# Patient Record
Sex: FEMALE | Race: WHITE | NOT HISPANIC OR LATINO | Employment: FULL TIME | URBAN - METROPOLITAN AREA
[De-identification: names, ages, dates, MRNs, and addresses within clinical notes are randomized per-mention and may not be internally consistent; named-entity substitution may affect disease eponyms.]

---

## 2017-01-30 ENCOUNTER — OFFICE VISIT (OUTPATIENT)
Dept: FAMILY MEDICINE | Facility: CLINIC | Age: 32
End: 2017-01-30
Payer: COMMERCIAL

## 2017-01-30 VITALS
WEIGHT: 149 LBS | RESPIRATION RATE: 18 BRPM | DIASTOLIC BLOOD PRESSURE: 66 MMHG | BODY MASS INDEX: 23.39 KG/M2 | HEART RATE: 68 BPM | SYSTOLIC BLOOD PRESSURE: 106 MMHG | HEIGHT: 67 IN

## 2017-01-30 DIAGNOSIS — T75.3XXA MOTION SICKNESS, INITIAL ENCOUNTER: Primary | ICD-10-CM

## 2017-01-30 PROCEDURE — 99213 OFFICE O/P EST LOW 20 MIN: CPT | Performed by: FAMILY MEDICINE

## 2017-01-30 RX ORDER — SCOLOPAMINE TRANSDERMAL SYSTEM 1 MG/1
PATCH, EXTENDED RELEASE TRANSDERMAL
Qty: 3 PATCH | Refills: 0 | Status: SHIPPED
Start: 2017-01-30 | End: 2018-09-05

## 2017-01-30 RX ORDER — ONDANSETRON 4 MG/1
4 TABLET, FILM COATED ORAL EVERY 8 HOURS PRN
Qty: 10 TABLET | Refills: 0 | Status: SHIPPED | OUTPATIENT
Start: 2017-01-30 | End: 2017-06-11

## 2017-01-30 NOTE — PATIENT INSTRUCTIONS
Thank you for choosing [facility] (formerly [facility]).  You may be receiving a survey in the mail from [facility] regarding your visit today.  Please take a few minutes to complete and return the survey to let us know how we are doing.      Our Clinic hours are:  Mondays    7:20 am - 7 pm  Tues -  Fri  7:20 am - 5 pm    Clinic Phone: [phone]    The clinic lab opens at 7:30 am Mon - Fri and appointments are required.    [facility]. [phone]  Monday-Thursday 8 am - 7pm  Tues/Wed/Fri 8 am - 5:30 pm

## 2017-01-30 NOTE — PROGRESS NOTES
"  SUBJECTIVE:                                                    Virginia Wood is a 31 year old female who presents to clinic today for the following health issues:    Chief Complaint   Patient presents with     Patient Request     Patient is traveling to [place] and is requesting something for motion sickness. Patient has previously tried patches and zofran which are of help.     SUBJECTIVE:  Virginia Wood, a 31 year old female scheduled an appointment to discuss the following issues:  Motion sickness, initial encounter    Always has problems with motion sickness, flying, riding in cars, boats.  Going to [place] for a week and is worried that she'll have problems.    is a pharmacist.    Has previously used zofran and scopolamine patches.    Medical, social, surgical, and family histories reviewed.    ROS:  5 point ROS negative except as noted above in HPI, including Gen., Resp., CV, GI &  system review.    OBJECTIVE:  /66 mmHg  Pulse 68  Resp 18  Ht 5' 7\" (1.702 m)  Wt 149 lb (67.586 kg)  BMI 23.33 kg/m2  LMP 04/11/2013  Breastfeeding? No  EXAM:  GENERAL APPEARANCE ADULT: Alert, no acute distress    ASSESSMENT/PLAN:    (T75.3XXA) Motion sickness, initial encounter  (primary encounter diagnosis)  Comment:    Plan: scopolamine (TRANSDERM) 72 hr patch,         ondansetron (ZOFRAN) 4 MG tablet           Educated on side effects, proper use of medication and other non-medication options (sea bands) for motion sickness.      Patient Instructions         Thank you for choosing [facility].  You may be receiving a survey in the mail from [employer] regarding your visit today.  Please take a few minutes to complete and return the survey to let us know how we are doing.      Our Clinic hours are:  Mondays    7:20 am - 7 pm  Tues -  Fri  7:20 am - 5 pm    Clinic Phone: [phone]    The clinic lab opens at 7:30 am Mon - Fri and appointments are required.    [facility] " [facility]. [phone]  Monday-Thursday 8 am - 7pm  Tues/Wed/Fri 8 am - 5:30 pm

## 2017-01-30 NOTE — MR AVS SNAPSHOT
After Visit Summary   1/30/2017    Virginia Wood    MRN: [id number]           Patient Information     Date Of Birth          1985        Visit Information        Provider Department      1/30/2017 9:40 AM Ute Stearns MD [facility]        Today's Diagnoses     Motion sickness, initial encounter    -  1       Care Instructions          Thank you for choosing [facility].  You may be receiving a survey in the mail from [facility] regarding your visit today.  Please take a few minutes to complete and return the survey to let us know how we are doing.      Our Clinic hours are:  Mondays    7:20 am - 7 pm  Tues -  Fri  7:20 am - 5 pm    Clinic Phone: [phone]    The clinic lab opens at 7:30 am Mon - Fri and appointments are required.    [facility]  Ph. [phone]  Monday-Thursday 8 am - 7pm  Tues/Wed/Fri 8 am - 5:30 pm               Follow-ups after your visit        Your next 10 appointments already scheduled     Jan 30, 2017  9:40 AM   Stephaniet Long with Ute Stearns MD   [facility] ([facility])    [address] [id number] [phone]            Feb 22, 2017 10:30 AM   PHYSICAL with Marcus Burks MD   [facility] ([facility])    [address] [address] [phone]              Who to contact     If you have questions or need follow up information about today's clinic visit or your schedule please contact [facility] directly at [phone].  Normal or non-critical lab and imaging results will be communicated to you by MyChart, letter or phone within 4 business days after the clinic has received the results. If you do not hear from us within 7 days, please contact the clinic through MyChart or phone. If you have a critical or abnormal lab result, we will notify you by phone as soon as  "possible.  Submit refill requests through [employer] or call your pharmacy and they will forward the refill request to us. Please allow 3 business days for your refill to be completed.          Additional Information About Your Visit        [employer] Information     [employer] gives you secure access to your electronic health record. If you see a primary care provider, you can also send messages to your care team and make appointments. If you have questions, please call your primary care clinic.  If you do not have a primary care provider, please call [phone] and they will assist you.        Care EveryWhere ID     This is your Care EveryWhere ID. This could be used by other organizations to access your [place] medical records  [id number]        Your Vitals Were     Pulse Respirations Height BMI (Body Mass Index) Last Period Breastfeeding?    68 18 5' 7\" (1.702 m) 23.33 kg/m2 04/11/2013 No       Blood Pressure from Last 3 Encounters:   01/30/17 106/66   05/21/16 110/57   05/28/15 106/63    Weight from Last 3 Encounters:   01/30/17 149 lb (67.586 kg)   05/28/15 138 lb 12.8 oz (62.959 kg)   10/02/14 145 lb (65.772 kg)              Today, you had the following     No orders found for display         Today's Medication Changes          These changes are accurate as of: 1/30/17  9:34 AM.  If you have any questions, ask your nurse or doctor.               Start taking these medicines.        Dose/Directions    ondansetron 4 MG tablet   Commonly known as:  ZOFRAN   Used for:  Motion sickness, initial encounter   Started by:  Ute Stearns MD        Dose:  4 mg   Take 1 tablet (4 mg) by mouth every 8 hours as needed for nausea   Quantity:  10 tablet   Refills:  0       scopolamine 72 hr patch   Commonly known as:  TRANSDERM   Used for:  Motion sickness, initial encounter   Started by:  Ute Stearns MD        Place 1 patch onto the skin every 72 hours.  Apply to hairless area behind one ear at least 4 hours before " travel.  Remove old patch and change every 3 days.   Quantity:  3 patch   Refills:  0            Where to get your medicines      These medications were sent to [facility] [place], [place] - [address] [facility]  [address] [facility] [place] [facility] [address]     Phone:  [phone]    - ondansetron 4 MG tablet  - scopolamine 72 hr patch             Primary Care Provider Office Phone # Fax #    Ute Stearns [phone]       [facility] [address] [facility] [address]        Thank you!     Thank you for choosing [facility]  for your care. Our goal is always to provide you with excellent care. Hearing back from our patients is one way we can continue to improve our services. Please take a few minutes to complete the written survey that you may receive in the mail after your visit with us. Thank you!             Your Updated Medication List - Protect others around you: Learn how to safely use, store and throw away your medicines at www.disposemymeds.org.          This list is accurate as of: 1/30/17  9:34 AM.  Always use your most recent med list.                   Brand Name Dispense Instructions for use    DAILY MULTI PO      Take  by mouth.       IBUPROFEN PO          ondansetron 4 MG tablet    ZOFRAN    10 tablet    Take 1 tablet (4 mg) by mouth every 8 hours as needed for nausea       scopolamine 72 hr patch    TRANSDERM    3 patch    Place 1 patch onto the skin every 72 hours.  Apply to hairless area behind one ear at least 4 hours before travel.  Remove old patch and change every 3 days.       TYLENOL 325 MG tablet   Generic drug:  acetaminophen      1 TO 2 TABLETS BY MOUTH EVERY 4 HOURS AS NEEDED FOR PAIN, DO NOT EXCEED 4,000 MG OF ACETAMINOPHEN IN 24 HOURS

## 2017-01-30 NOTE — NURSING NOTE
"Chief Complaint   Patient presents with     Patient Request     Patient is traveling to [place] and is requesting something for motion sickness. Patient has previously tried patches and zofran which are of help.       Initial /66 mmHg  Pulse 68  Resp 18  Ht 5' 7\" (1.702 m)  Wt 149 lb (67.586 kg)  BMI 23.33 kg/m2  LMP 04/11/2013  Breastfeeding? No Estimated body mass index is 23.33 kg/(m^2) as calculated from the following:    Height as of this encounter: 5' 7\" (1.702 m).    Weight as of this encounter: 149 lb (67.586 kg).  BP completed using cuff size: regular    "

## 2017-02-22 ENCOUNTER — OFFICE VISIT (OUTPATIENT)
Dept: OBGYN | Facility: CLINIC | Age: 32
End: 2017-02-22
Payer: COMMERCIAL

## 2017-02-22 VITALS
SYSTOLIC BLOOD PRESSURE: 109 MMHG | DIASTOLIC BLOOD PRESSURE: 71 MMHG | HEART RATE: 84 BPM | HEIGHT: 67 IN | BODY MASS INDEX: 23.32 KG/M2 | WEIGHT: 148.6 LBS

## 2017-02-22 DIAGNOSIS — R68.82 DECREASED LIBIDO: ICD-10-CM

## 2017-02-22 DIAGNOSIS — Z90.710 STATUS POST HYSTERECTOMY: Primary | ICD-10-CM

## 2017-02-22 DIAGNOSIS — Z00.00 ROUTINE GENERAL MEDICAL EXAMINATION AT A HEALTH CARE FACILITY: ICD-10-CM

## 2017-02-22 DIAGNOSIS — N80.9 ENDOMETRIOSIS: ICD-10-CM

## 2017-02-22 PROCEDURE — 99395 PREV VISIT EST AGE 18-39: CPT | Performed by: OBSTETRICS & GYNECOLOGY

## 2017-02-22 NOTE — PROGRESS NOTES
SUBJECTIVE:     CC: Virginia Wood is an 31 year old woman who presents for preventive health visit.     Healthy Habits:    Do you get at least three servings of calcium containing foods daily (dairy, green leafy vegetables, etc.)? yes    Amount of exercise or daily activities, outside of work: 6 day(s) per week    Problems taking medications regularly No    Medication side effects: No    Have you had an eye exam in the past two years? yes    Do you see a dentist twice per year? yes    Do you have sleep apnea, excessive snoring or daytime drowsiness?no        Pain off and on on right side- possible ovarian cyst.  Low libido..    Today's PHQ-2 Score:   PHQ-2 ( 1999 Pf[employer]) 2/22/2017 1/30/2017   Q1: Little interest or pleasure in doing things 0 0   Q2: Feeling down, depressed or hopeless 0 0   PHQ-2 Score 0 0       Abuse: Current or Past(Physical, Sexual or Emotional)- No  Do you feel safe in your environment - Yes    Social History   Substance Use Topics     Smoking status: Former Smoker     Packs/day: 0.50     Years: 2.00     Types: Cigarettes     Quit date: 2/1/2008     Smokeless tobacco: Never Used      Comment: occasional smoker     Alcohol use No      Comment: minimal; quit since pregnancy     The patient does not drink >3 drinks per day nor >7 drinks per week.    No results for input(s): CHOL, HDL, LDL, TRIG, CHOLHDLRATIO, NHDL in the last 15405 hours.    Reviewed orders with patient.  Reviewed health maintenance and updated orders accordingly - Yes    Mammo Decision Support:  Mammogram not appropriate for this patient based on age.    Pertinent mammograms are reviewed under the imaging tab.  History of abnormal Pap smear: Status post benign hysterectomy. Health Maintenance and Surgical History updated.  All Histories reviewed and updated in Epic.  History reviewed. No pertinent past medical history.   Past Surgical History   Procedure Laterality Date     C oral surgery procedure  6/2007     luisa  teeth     Laparoscopic hysterectomy total, bilateral salpingo-oophorectomy, combined  2013     Procedure: COMBINED LAPAROSCOPIC HYSTERECTOMY TOTAL, SALPINGO-OOPHORECTOMY;  Laparoscopic total hysterectomy & bilateral salpingectomy & left oopherectomy & cystoscopy;  Surgeon: Marcus Burks MD;  Location: [facility] OR     Hysterectomy, pap no longer indicated       Obstetric History       T1      TAB0   SAB1   E0   M0   L2       # Outcome Date GA Lbr Jaylen/2nd Weight Sex Delivery Anes PTL Lv   3 Para 02/06/10   7 lb 1 oz (3.204 kg) F  EPI  Y      Name: Ginette      Apgar1:  9                Apgar5: 9   2 Term 08 40w0d 07:17 7 lb 14 oz (3.572 kg) M IVD EPI N Y      Name: Pantera      Apgar1:  9                Apgar5: 9   1 SAB /03 6w0d    SAB   N          ROS:  C: NEGATIVE for fever, chills, change in weight  I: NEGATIVE for worrisome rashes, moles or lesions  E: NEGATIVE for vision changes or irritation  ENT: NEGATIVE for ear, mouth and throat problems  R: NEGATIVE for significant cough or SOB  B: NEGATIVE for masses, tenderness or discharge  CV: NEGATIVE for chest pain, palpitations or peripheral edema  GI: NEGATIVE for nausea, abdominal pain, heartburn, or change in bowel habits  : NEGATIVE for unusual urinary or vaginal symptoms. Periods are regular.  M: NEGATIVE for significant arthralgias or myalgia  N: NEGATIVE for weakness, dizziness or paresthesias  E: NEGATIVE for temperature intolerance, skin/hair changes  H: NEGATIVE for bleeding problems  P: NEGATIVE for changes in mood or affect    BP Readings from Last 3 Encounters:   17 109/71   17 106/66   16 110/57    Wt Readings from Last 3 Encounters:   17 148 lb 9.6 oz (67.4 kg)   17 149 lb (67.6 kg)   05/28/15 138 lb 12.8 oz (63 kg)                  Patient Active Problem List   Diagnosis     Lumbago     Tobacco use disorder     Lumbar radicular pain     CARDIOVASCULAR SCREENING; LDL GOAL LESS THAN 160      Endometriosis     UTI (urinary tract infection)     Status post left oophorectomy     Status post hysterectomy     Past Surgical History   Procedure Laterality Date     C oral surgery procedure  6/2007     wisdom teeth     Laparoscopic hysterectomy total, bilateral salpingo-oophorectomy, combined  5/20/2013     Procedure: COMBINED LAPAROSCOPIC HYSTERECTOMY TOTAL, SALPINGO-OOPHORECTOMY;  Laparoscopic total hysterectomy & bilateral salpingectomy & left oopherectomy & cystoscopy;  Surgeon: Marcus Burks MD;  Location: [facility] OR     Hysterectomy, pap no longer indicated         Social History   Substance Use Topics     Smoking status: Former Smoker     Packs/day: 0.50     Years: 2.00     Types: Cigarettes     Quit date: 2/1/2008     Smokeless tobacco: Never Used      Comment: occasional smoker     Alcohol use No      Comment: minimal; quit since pregnancy     Family History   Problem Relation Age of Onset     Lipids Maternal Grandmother      HEART DISEASE Maternal Grandmother      Eye Disorder Maternal Grandmother      macular degeneration     DIABETES Maternal Grandmother      Hypertension Maternal Grandmother      Depression Mother      Alcohol/Drug Maternal Grandfather      alcohol     Depression Maternal Grandfather      Asthma No family hx of      Breast Cancer No family hx of      Cancer - colorectal No family hx of      Prostate Cancer No family hx of      CEREBROVASCULAR DISEASE No family hx of          Current Outpatient Prescriptions   Medication Sig Dispense Refill     COMPOUND (CMPD RX) - PHARMACY TO MIX COMPOUNDED MEDICATION Testosterone powder 0.3125 mg   1 po daily capsule 30 capsule 1     IBUPROFEN PO        Multiple Vitamins-Minerals (DAILY MULTI PO) Take  by mouth.       TYLENOL 325 MG OR TABS 1 TO 2 TABLETS BY MOUTH EVERY 4 HOURS AS NEEDED FOR PAIN, DO NOT EXCEED 4,000 MG OF ACETAMINOPHEN IN 24 HOURS       scopolamine (TRANSDERM) 72 hr patch Place 1 patch onto the skin every 72 hours.  Apply to  "hairless area behind one ear at least 4 hours before travel.  Remove old patch and change every 3 days. (Patient not taking: Reported on 2/22/2017) 3 patch 0     ondansetron (ZOFRAN) 4 MG tablet Take 1 tablet (4 mg) by mouth every 8 hours as needed for nausea (Patient not taking: Reported on 2/22/2017) 10 tablet 0     Allergies   Allergen Reactions     Nkda [No Known Drug Allergies]      OBJECTIVE:     /71 (BP Location: Right arm, Patient Position: Chair, Cuff Size: Adult Regular)  Pulse 84  Ht 5' 6.5\" (1.689 m)  Wt 148 lb 9.6 oz (67.4 kg)  LMP 04/11/2013  BMI 23.63 kg/m2  EXAM:  GENERAL: healthy, alert and no distress  EYES: Eyes grossly normal to inspection, PERRL and conjunctivae and sclerae normal  NECK: no adenopathy, no asymmetry, masses, or scars and thyroid normal to palpation  RESP: lungs clear to auscultation - no rales, rhonchi or wheezes  BREAST: normal without masses, tenderness or nipple discharge and no palpable axillary masses or adenopathy  CV: regular rate and rhythm, normal S1 S2, no S3 or S4, no murmur, click or rub, no peripheral edema and peripheral pulses strong  ABDOMEN: soft, nontender, no hepatosplenomegaly, no masses and bowel sounds normal   (female): normal female external genitalia, normal urethral meatus , vaginal mucosa pink, moist, well rugated and normal post-hysterectomy exam without masses.   MS: no gross musculoskeletal defects noted, no edema  SKIN: no suspicious lesions or rashes  NEURO: Normal strength and tone, mentation intact and speech normal  PSYCH: mentation appears normal, affect normal/bright    ASSESSMENT/PLAN:     1. Status post hysterectomy  Benign indication  Pap no longer indicated    2. Routine general medical examination at a health care facility      3. Decreased libido  No dyspareunia  - COMPOUND (CMPD RX) - PHARMACY TO MIX COMPOUNDED MEDICATION; Testosterone powder 0.3125 mg   1 po daily capsule  Dispense: 30 capsule; Refill: 1    4. " "Endometriosis  Extensive,  involving the diaphragm  Retains her right ovary      COUNSELING:   Reviewed preventive health counseling, as reflected in patient instructions       Regular exercise       Healthy diet/nutrition         reports that she quit smoking about 9 years ago. Her smoking use included Cigarettes. She has a 1.00 pack-year smoking history. She has never used smokeless tobacco.    Estimated body mass index is 23.63 kg/(m^2) as calculated from the following:    Height as of this encounter: 5' 6.5\" (1.689 m).    Weight as of this encounter: 148 lb 9.6 oz (67.4 kg).       Counseling Resources:  ATP IV Guidelines  Pooled Cohorts Equation Calculator  Breast Cancer Risk Calculator  FRAX Risk Assessment  ICSI Preventive Guidelines  Dietary Guidelines for Americans, 2010  USDA's MyPlate  ASA Prophylaxis  Lung CA Screening    Marcus Burks MD  [facility]  "

## 2017-02-22 NOTE — NURSING NOTE
"Initial /71 (BP Location: Right arm, Patient Position: Chair, Cuff Size: Adult Regular)  Pulse 84  Ht 5' 6.5\" (1.689 m)  Wt 148 lb 9.6 oz (67.4 kg)  LMP 04/11/2013  BMI 23.63 kg/m2 Estimated body mass index is 23.63 kg/(m^2) as calculated from the following:    Height as of this encounter: 5' 6.5\" (1.689 m).    Weight as of this encounter: 148 lb 9.6 oz (67.4 kg). .      "

## 2017-02-22 NOTE — MR AVS SNAPSHOT
After Visit Summary   2/22/2017    Virginia Wood    MRN: [id number]           Patient Information     Date Of Birth          1985        Visit Information        Provider Department      2/22/2017 10:30 AM Marcus Burks MD [facility]        Today's Diagnoses     Status post hysterectomy    -  1    Routine general medical examination at a health care facility        Decreased libido          Care Instructions      Preventive Health Recommendations  Female Ages 26 - 39  Yearly exam:   See your health care provider every year in order to    Review health changes.     Discuss preventive care.      Review your medicines if you your doctor has prescribed any.    Until age 30: Get a Pap test every three years (more often if you have had an abnormal result).    After age 30: Talk to your doctor about whether you should have a Pap test every 3 years or have a Pap test with HPV screening every 5 years.   You do not need a Pap test if your uterus was removed (hysterectomy) and you have not had cancer.  You should be tested each year for STDs (sexually transmitted diseases), if you're at risk.   Talk to your provider about how often to have your cholesterol checked.  If you are at risk for diabetes, you should have a diabetes test (fasting glucose).  Shots: Get a flu shot each year. Get a tetanus shot every 10 years.   Nutrition:     Eat at least 5 servings of fruits and vegetables each day.    Eat whole-grain bread, whole-wheat pasta and brown rice instead of white grains and rice.    Talk to your provider about Calcium and Vitamin D.     Lifestyle    Exercise at least 150 minutes a week (30 minutes a day, 5 days of the week). This will help you control your weight and prevent disease.    Limit alcohol to one drink per day.    No smoking.     Wear sunscreen to prevent skin cancer.    See your dentist every six months for an exam and cleaning.          Follow-ups after your visit    "     Who to contact     If you have questions or need follow up information about today's clinic visit or your schedule please contact [facility] directly at [phone].  Normal or non-critical lab and imaging results will be communicated to you by [employer]ul[employer], letter or phone within 4 business days after the clinic has received the results. If you do not hear from us within 7 days, please contact the clinic through [employer]ul[employer] or phone. If you have a critical or abnormal lab result, we will notify you by phone as soon as possible.  Submit refill requests through [employer] or call your pharmacy and they will forward the refill request to us. Please allow 3 business days for your refill to be completed.          Additional Information About Your Visit        Demandbase Information     [employer] gives you secure access to your electronic health record. If you see a primary care provider, you can also send messages to your care team and make appointments. If you have questions, please call your primary care clinic.  If you do not have a primary care provider, please call [phone] and they will assist you.        Care EveryWhere ID     This is your Care EveryWhere ID. This could be used by other organizations to access your [place] medical records  [id number]        Your Vitals Were     Pulse Height Last Period BMI (Body Mass Index)          84 5' 6.5\" (1.689 m) 04/11/2013 23.63 kg/m2         Blood Pressure from Last 3 Encounters:   02/22/17 109/71   01/30/17 106/66   05/21/16 110/57    Weight from Last 3 Encounters:   02/22/17 148 lb 9.6 oz (67.4 kg)   01/30/17 149 lb (67.6 kg)   05/28/15 138 lb 12.8 oz (63 kg)              Today, you had the following     No orders found for display         Today's Medication Changes          These changes are accurate as of: 2/22/17 11:15 AM.  If you have any questions, ask your nurse or doctor.               Start taking these medicines.        Dose/Directions    COMPOUND - " PHARMACY TO MIX COMPOUNDED MEDICATION   Commonly known as:  CMPD RX   Used for:  Decreased libido   Started by:  Marcus Burks MD        Testosterone powder 0.3125 mg  1 po daily capsule   Quantity:  30 capsule   Refills:  1            Where to get your medicines      These medications were sent to [place], [place] - [address]  [address] [facility] [address]     Phone:  [phone]     COMPOUND - PHARMACY TO MIX COMPOUNDED MEDICATION                Primary Care Provider Office Phone # Fax #    Ute Stearns [phone]       [facility] [address] [address] [facility] [address]        Thank you!     Thank you for choosing [facility]  for your care. Our goal is always to provide you with excellent care. Hearing back from our patients is one way we can continue to improve our services. Please take a few minutes to complete the written survey that you may receive in the mail after your visit with us. Thank you!             Your Updated Medication List - Protect others around you: Learn how to safely use, store and throw away your medicines at www.disposemymeds.org.          This list is accurate as of: 2/22/17 11:15 AM.  Always use your most recent med list.                   Brand Name Dispense Instructions for use    COMPOUND - PHARMACY TO MIX COMPOUNDED MEDICATION    CMPD RX    30 capsule    Testosterone powder 0.3125 mg  1 po daily capsule       DAILY MULTI PO      Take  by mouth.       IBUPROFEN PO          ondansetron 4 MG tablet    ZOFRAN    10 tablet    Take 1 tablet (4 mg) by mouth every 8 hours as needed for nausea       scopolamine 72 hr patch    TRANSDERM    3 patch    Place 1 patch onto the skin every 72 hours.  Apply to hairless area behind one ear at least 4 hours before travel.  Remove old patch and change every 3 days.       TYLENOL 325 MG tablet   Generic drug:  acetaminophen      1 TO 2 TABLETS BY MOUTH EVERY 4 HOURS  AS NEEDED FOR PAIN, DO NOT EXCEED 4,000 MG OF ACETAMINOPHEN IN 24 HOURS

## 2017-04-02 ENCOUNTER — MYC MEDICAL ADVICE (OUTPATIENT)
Dept: OBGYN | Facility: CLINIC | Age: 32
End: 2017-04-02

## 2017-04-02 DIAGNOSIS — R68.82 DECREASED LIBIDO: ICD-10-CM

## 2017-06-06 ENCOUNTER — MYC REFILL (OUTPATIENT)
Dept: OBGYN | Facility: CLINIC | Age: 32
End: 2017-06-06

## 2017-06-06 DIAGNOSIS — R68.82 DECREASED LIBIDO: ICD-10-CM

## 2017-06-11 ENCOUNTER — MYC REFILL (OUTPATIENT)
Dept: FAMILY MEDICINE | Facility: CLINIC | Age: 32
End: 2017-06-11

## 2017-06-11 DIAGNOSIS — T75.3XXA MOTION SICKNESS, INITIAL ENCOUNTER: ICD-10-CM

## 2017-06-13 RX ORDER — ONDANSETRON 4 MG/1
4 TABLET, FILM COATED ORAL EVERY 8 HOURS PRN
Qty: 10 TABLET | Refills: 3 | Status: SHIPPED | OUTPATIENT
Start: 2017-06-13 | End: 2018-09-05

## 2017-06-13 NOTE — TELEPHONE ENCOUNTER
Message from [employer]:  Original authorizing provider: Ute Stearns MD    Virginia Wood would like a refill of the following medications:  ondansetron (ZOFRAN) 4 MG tablet [Ute Stearns MD]    Preferred pharmacy: [facility] [address]    Comment:  Is it possible to get a PRN refills on this? It has made my summer boating and traveling much more enjoyable. Thank you! Virginia

## 2017-08-28 DIAGNOSIS — R68.82 DECREASED LIBIDO: ICD-10-CM

## 2017-08-28 NOTE — TELEPHONE ENCOUNTER
Testosterone 0.4mg Capsules  Last Written Prescription Date: 6/11/17  Last Fill Quantity: 30,  # refills: 1  Last Office Visit with [facility], P or [facility] prescribing provider: 6/11/17

## 2017-08-29 NOTE — TELEPHONE ENCOUNTER
Prescription refilled by Dr. Burks.  Mailed through [facility] to [facility] on [address]    Raquel Diaz   [facility] Clinic  [facility]

## 2017-09-29 ENCOUNTER — MYC MEDICAL ADVICE (OUTPATIENT)
Dept: OBGYN | Facility: CLINIC | Age: 32
End: 2017-09-29

## 2017-09-29 DIAGNOSIS — R68.82 DECREASED LIBIDO: ICD-10-CM

## 2017-10-02 NOTE — TELEPHONE ENCOUNTER
From: Virginia Wood  To: Marcus Burks MD  Sent: 9/29/2017 9:27 AM CDT  Subject: Question about medications    Good morning!     I just refilled my last scrip for the testosterone caps. Is it possible to up the dosing again before sending to pharmacy? I still do not notice a change.   Also, wondering if I can get a 60 day supply instead of 30 day? With kiddos and busy schedule... well, you know. Its just not convenient to call every month for a refill. If not, I understand.     Thanks so much!   Virginia

## 2018-02-05 ENCOUNTER — MYC MEDICAL ADVICE (OUTPATIENT)
Dept: FAMILY MEDICINE | Facility: CLINIC | Age: 33
End: 2018-02-05

## 2018-02-05 DIAGNOSIS — T75.3XXA SEA SICKNESS, INITIAL ENCOUNTER: Primary | ICD-10-CM

## 2018-02-05 RX ORDER — SCOLOPAMINE TRANSDERMAL SYSTEM 1 MG/1
PATCH, EXTENDED RELEASE TRANSDERMAL
Qty: 4 PATCH | Refills: 0 | Status: SHIPPED | OUTPATIENT
Start: 2018-02-05 | End: 2018-09-05

## 2018-02-05 NOTE — TELEPHONE ENCOUNTER
Routing refill request to provider for review/approval because:  Drug not active on patient's medication list    Thank you  Ne NAM RN

## 2018-02-20 ENCOUNTER — VIRTUAL VISIT (OUTPATIENT)
Dept: FAMILY MEDICINE | Facility: OTHER | Age: 33
End: 2018-02-20

## 2018-02-20 NOTE — PROGRESS NOTES
"Date:   Clinician: Javan Bess  Clinician NPI: [id number]  Patient: Virginia Wood  Patient : 1985  Patient Address: [address] [place], [place] [address]  Patient Phone: [phone]  Visit Protocol: Low back pain  Patient Summary:  Virginia is a 32 year old ( : 1985 ) female who initiated a Visit for evaluation of Low Back Pain. When asked the question \"Please sign me up to receive news, health information and promotions from [employer].\", Virginia responded \"No\".    Her back pain began 7 to 10 days ago. Virginia has not seen a provider to treat this episode of low back pain. Her back pain is located in the buttocks and low back area area. Virginia does not know what caused her back pain.   The pain began gradually and has not been getting worse since then. Virginia describes the pain as moderate (interferes with normal daily activities). The pain hurts the most when lying down and gets better and worse depending on the activity. Standing is the most comfortable position. The back pain disturbs her sleep. She is able to stand or walk on her tiptoes and is able to walk on her heels with her toes lifted off the ground. Virginia has had similar episodes of back pain in the past.   Virginia is experiencing weakness in the legs and shooting pain.    Symptom Details     Shooting pain: The low back pain extends or shoots into her thighs, buttocks, knees, and lower legs. The pain is on the left side.     Weakness: Virginia is experiencing weakness in the left leg. The weakness is mild (little or no interference with daily activities).      Virginia denies back muscle spasms, tingling in the legs, numbness in the legs, hematuria, dysuria, loss of bowel or bladder control, urinary retention, a rash in the same area as the back pain, neck or back stiffness that makes it difficult to bend or turn, abdominal pain, saddle anesthesia, urinary frequency, feeling feverish, foul-smelling urine, chills, " diarrhea, and nausea or vomiting. The pain does not significantly increase during bowel movements or when she coughs or sneezes. Her pain does not decrease when bending forward. She usually does not experience back pain at night. She has not had unintended weight loss in the last three months, kidney stones, cancer, and back surgery.    Treatments  Virginia tried using heat, over-the-counter medications (such as ibuprofen, aspirin, Tylenol), massages, and cold pack to manage her low back pain. The over-the-counter medications have been ineffective in controlling the pain.   Other approaches used to manage the back pain as reported by the patient (free text):   Stretching exercises give to me my PT from my last pack injury.    She denies pregnancy and denies breastfeeding. She does not menstruate.   Virginia does not smoke or use smokeless tobacco.   MEDICATIONS:  Naproxen (Aleve, Naprosyn), ibuprofen (Advil, Motrin, Nuprin), and acetaminophen (Tylenol)  , ALLERGIES:  NKDA   Clinician Response:  Dear Virginia,  Based on the information you provided, you likely have Mechanical Low Back Pain.   I am prescribing:   Methocarbamol (Robaxin) 500 mg oral tablet. Take three 500 mg tablets by mouth four times a day for 2-3 days, then take two 500 mg tablets by mouth four times a day for 4 days.   Low back pain without radiation into the legs (also known as mechanical low back pain) is the most common form of back pain in the [place]. The vast majority of cases are not associated with a specific disease or structural abnormality in the spine and do not require diagnostic imaging (such as an X-ray, MRI or CAT scan). Heating, cooling, back exercises and stretches should reduce your back pain within 4-6 weeks. During this time, remain active. Activities like yoga and pilates are particularly helpful for your back, as you typically recover and avoid sitting or standing in the same position for too long.   Please take the  following precautions to prevent and reduce back pain:     Apply heating pads on the sore area for a short duration (10 minutes per hour and as needed) in a position of comfort to assist with pain management. A hot bath or a heating pad on your lower back may also help reduce pain and stiffness.    Maintaining a good posture reduces stress on the back muscles. To help reduce the stress on your low back, stand and sit up straight. Try not to slouch when standing or sitting. Try not to stay in the same position for a long time. Switch positions every 20 to 30 minutes if possible. Learn to lift things correctly.    Back pain can cause stress due to pain, loss of sleep, inability to work and time away from work. On the other hand, family and work problems, financial pressures and stress can make your back pain worse. For this reason, it is necessary to learn to manage everyday stress. Take time to relax and try some relaxation techniques when you feel stressed.    Click the following link for more information on ways to prevent back pain: Prevent back pain.     Consult with your primary care provider right away if you experience any of the following symptoms: weakness in one or both legs, bladder or bowel problems, problems with sexual function, unexplained fever or weight loss, if pain spreads into the lower leg and you notice that your leg is growing weaker, or if your pain lasts longer than 4 weeks.    Diagnosis: Mechanical low back pain  Diagnosis ICD: M54.5  Prescription: methocarbamol (Robaxin) 500 mg oral tablet 68 tablets, 7 days supply. Take three 500 mg tablets by mouth four times a day for 2-3 days, then take two 500 mg tablets by mouth four times a day for 4 days. Refills: 0, Refill as needed: no, Allow substitutions: yes  Pharmacy: [facility] - [phone] - [address] [place] [address]

## 2018-02-23 ENCOUNTER — RADIANT APPOINTMENT (OUTPATIENT)
Dept: GENERAL RADIOLOGY | Facility: CLINIC | Age: 33
End: 2018-02-23
Attending: NURSE PRACTITIONER
Payer: COMMERCIAL

## 2018-02-23 ENCOUNTER — OFFICE VISIT (OUTPATIENT)
Dept: FAMILY MEDICINE | Facility: CLINIC | Age: 33
End: 2018-02-23
Payer: COMMERCIAL

## 2018-02-23 VITALS
DIASTOLIC BLOOD PRESSURE: 62 MMHG | WEIGHT: 151 LBS | RESPIRATION RATE: 16 BRPM | TEMPERATURE: 98.6 F | HEIGHT: 66 IN | BODY MASS INDEX: 24.27 KG/M2 | SYSTOLIC BLOOD PRESSURE: 113 MMHG | HEART RATE: 80 BPM

## 2018-02-23 DIAGNOSIS — M54.50 LOW BACK PAIN WITH RADIATION: ICD-10-CM

## 2018-02-23 DIAGNOSIS — M54.50 LOW BACK PAIN WITH RADIATION: Primary | ICD-10-CM

## 2018-02-23 PROCEDURE — 72100 X-RAY EXAM L-S SPINE 2/3 VWS: CPT | Mod: FY

## 2018-02-23 PROCEDURE — 99213 OFFICE O/P EST LOW 20 MIN: CPT | Performed by: NURSE PRACTITIONER

## 2018-02-23 RX ORDER — METHYLPREDNISOLONE 4 MG
TABLET, DOSE PACK ORAL
Qty: 21 TABLET | Refills: 0 | Status: SHIPPED | OUTPATIENT
Start: 2018-02-23 | End: 2018-08-15

## 2018-02-23 ASSESSMENT — PAIN SCALES - GENERAL: PAINLEVEL: MODERATE PAIN (4)

## 2018-02-23 NOTE — PATIENT INSTRUCTIONS
Will let you know about x ray results when available.  Try the steroid pack and PT.  If no improvement, will consider repeat MRI and back specialist consult.  Follow up if symptoms persist or worsen and as needed.        Thank you for choosing [facility].  You may be receiving a survey in the mail from Ryan Ferro regarding your visit today.  Please take a few minutes to complete and return the survey to let us know how we are doing.      Our Clinic hours are:  Mondays    7:20 am - 7 pm  Tues -  Fri  7:20 am - 5 pm    Clinic Phone: [phone]    The clinic lab opens at 7:30 am Mon - Fri and appointments are required.    [facility]. [phone]  Monday-Thursday 8 am - 7pm  Tues/Wed/Fri 8 am - 5:30 pm

## 2018-02-23 NOTE — NURSING NOTE
"Chief Complaint   Patient presents with     Back Pain       Initial /62 (BP Location: Right arm, Patient Position: Chair, Cuff Size: Adult Regular)  Pulse 80  Temp 98.6  F (37  C) (Oral)  Resp 16  Ht 5' 6.25\" (1.683 m)  Wt 151 lb (68.5 kg)  LMP 04/11/2013  BMI 24.19 kg/m2 Estimated body mass index is 24.19 kg/(m^2) as calculated from the following:    Height as of this encounter: 5' 6.25\" (1.683 m).    Weight as of this encounter: 151 lb (68.5 kg).  Medication Reconciliation: complete  "

## 2018-02-23 NOTE — MR AVS SNAPSHOT
After Visit Summary   2/23/2018    Virginia Wood    MRN: [id number]           Patient Information     Date Of Birth          1985        Visit Information        Provider Department      2/23/2018 11:20 AM Cathy Newman APRN [facility]        Today's Diagnoses     Low back pain with radiation    -  1      Care Instructions    Will let you know about x ray results when available.  Try the steroid pack and PT.  If no improvement, will consider repeat MRI and back specialist consult.  Follow up if symptoms persist or worsen and as needed.        Thank you for choosing [facility].  You may be receiving a survey in the mail from [employer] regarding your visit today.  Please take a few minutes to complete and return the survey to let us know how we are doing.      Our Clinic hours are:  Mondays    7:20 am - 7 pm  Tues -  Fri  7:20 am - 5 pm    Clinic Phone: [phone]    The clinic lab opens at 7:30 am Mon - Fri and appointments are required.    [facility]  Ph. [phone]  Monday-Thursday 8 am - 7pm  Tues/Wed/Fri 8 am - 5:30 pm                 Follow-ups after your visit        Additional Services     PHYSICAL THERAPY REFERRAL       *This therapy referral will be filtered to a centralized scheduling office at [facility] and the patient will receive a call to schedule an appointment at a [place] location most convenient for them. *     [facility] provides Physical Therapy evaluation and treatment and many specialty services across the [place] system.  If requesting a specialty program, please choose from the list below.    If you have not heard from the scheduling office within 2 business days, please call [phone] for all locations, with the exception of [place], please call [phone] and [facility], please call [phone]  Treatment: Evaluation & Treatment  Special  "Instructions/Modalities:   Special Programs:     Please be aware that coverage of these services is subject to the terms and limitations of your health insurance plan.  Call member services at your health plan with any benefit or coverage questions.      **Note to Provider:  If you are referring outside of [place] for the therapy appointment, please list the name of the location in the \"special instructions\" above, print the referral and give to the patient to schedule the appointment.                  Future tests that were ordered for you today     Open Future Orders        Priority Expected Expires Ordered    XR Lumbar Spine 2/3 Views Routine 2/23/2018 2/23/2019 2/23/2018            Who to contact     If you have questions or need follow up information about today's clinic visit or your schedule please contact [facility] directly at [phone].  Normal or non-critical lab and imaging results will be communicated to you by [employer], letter or phone within 4 business days after the clinic has received the results. If you do not hear from us within 7 days, please contact the clinic through [employer]erve[employer] or phone. If you have a critical or abnormal lab result, we will notify you by phone as soon as possible.  Submit refill requests through [employer] or call your pharmacy and they will forward the refill request to us. Please allow 3 business days for your refill to be completed.          Additional Information About Your Visit        [employer] Information     [employer] gives you secure access to your electronic health record. If you see a primary care provider, you can also send messages to your care team and make appointments. If you have questions, please call your primary care clinic.  If you do not have a primary care provider, please call [phone] and they will assist you.        Care EveryWhere ID     This is your Care EveryWhere ID. This could be used by other organizations to access your [place] " "medical records  [id number]        Your Vitals Were     Pulse Temperature Respirations Height Last Period BMI (Body Mass Index)    80 98.6  F (37  C) (Oral) 16 5' 6.25\" (1.683 m) 04/11/2013 24.19 kg/m2       Blood Pressure from Last 3 Encounters:   02/23/18 113/62   02/22/17 109/71   01/30/17 106/66    Weight from Last 3 Encounters:   02/23/18 151 lb (68.5 kg)   02/22/17 148 lb 9.6 oz (67.4 kg)   01/30/17 149 lb (67.6 kg)              We Performed the Following     PHYSICAL THERAPY REFERRAL          Today's Medication Changes          These changes are accurate as of 2/23/18 11:57 AM.  If you have any questions, ask your nurse or doctor.               Start taking these medicines.        Dose/Directions    methylPREDNISolone 4 MG tablet   Commonly known as:  MEDROL DOSEPAK   Used for:  Low back pain with radiation   Started by:  Cathy Newman APRN CNP        Follow package instructions   Quantity:  21 tablet   Refills:  0            Where to get your medicines      These medications were sent to [facility] [place], [place] - [address]  [address] [facility] [address]     Phone:  [phone]     methylPREDNISolone 4 MG tablet                Primary Care Provider Office Phone # Fax #    Ute Stearns -[phone] [address] [facility] [address]        Equal Access to Services     NOE HERNANDEZ AH: Hadii mari england hadasho Soomaali, waaxda luqadaha, qaybta kaalmada adeegyada, waxay jenna cisse. So [facility] [phone].    ATENCIÓN: Si missyla braulio, tiene a clinton disposición servicios gratuitos de asistencia lingüística. Llame al [phone].    We comply with applicable federal civil rights laws and [place] laws. We do not discriminate on the basis of race, color, national origin, age, disability, sex, sexual orientation, or gender identity.            Thank you!     Thank you for choosing [facility] [place]  for your care. Our goal is always to provide you with excellent care. Hearing back from our patients is one way we can continue to improve our services. Please take a few minutes to complete the written survey that you may receive in the mail after your visit with us. Thank you!             Your Updated Medication List - Protect others around you: Learn how to safely use, store and throw away your medicines at www.disposemymeds.org.          This list is accurate as of 2/23/18 11:57 AM.  Always use your most recent med list.                   Brand Name Dispense Instructions for use Diagnosis    * DAILY MULTI PO      Take  by mouth.        * IMMUNE SUPPORT PO      Immune Support        Flibanserin 100 MG Tabs    ADDYI    30 tablet    Take 1 tablet by mouth At Bedtime    Decreased libido       IBUPROFEN PO           methylPREDNISolone 4 MG tablet    MEDROL DOSEPAK    21 tablet    Follow package instructions    Low back pain with radiation       ondansetron 4 MG tablet    ZOFRAN    10 tablet    Take 1 tablet (4 mg) by mouth every 8 hours as needed for nausea    Motion sickness, initial encounter       * scopolamine 72 hr patch    TRANSDERM    3 patch    Place 1 patch onto the skin every 72 hours.  Apply to hairless area behind one ear at least 4 hours before travel.  Remove old patch and change every 3 days.    Motion sickness, initial encounter       * scopolamine 72 hr patch    TRANSDERM    4 patch    Apply 1 patch to hairless area behind one ear at least 4 hours before travel.  Remove old patch and change every 3 days (72 hours).    Sea sickness, initial encounter       TYLENOL 325 MG tablet   Generic drug:  acetaminophen      1 TO 2 TABLETS BY MOUTH EVERY 4 HOURS AS NEEDED FOR PAIN, DO NOT EXCEED 4,000 MG OF ACETAMINOPHEN IN 24 HOURS        * Notice:  This list has 4 medication(s) that are the same as other medications prescribed for you. Read the directions carefully, and ask your doctor or other  care provider to review them with you.

## 2018-02-23 NOTE — PROGRESS NOTES
SUBJECTIVE:   Virginia Wood is a 32 year old female who presents to clinic today for the following health issues:      Back Pain       Duration: years        Specific cause: lifting, turning/bending, work-related    Description:   Location of pain: low back left  Character of pain: sharp, dull ache, stabbing, gnawing, burning, fullness, cramping and intermittent  Pain radiation:radiates into the left leg  New numbness or weakness in legs, not attributed to pain:  no     Intensity: Currently 4/10, At its worst 7/10    History:   Pain interferes with job: YES  History of back problems: previous herniated disc  Any previous MRI or X-rays: Yes- at [place].  Date 2010  Sees a specialist for back pain:  No  Therapies tried without relief: cold, heat and rest    Alleviating factors:   Improved by: acetaminophen (Tylenol), cold, heat, NSAIDs and rest      Precipitating factors:  Worsened by: Lifting, Bending and Sitting    Functional and Psychosocial Screen (Lisette STarT Back):      Not performed today          Accompanying Signs & Symptoms:  Risk of Fracture:  None  Risk of Cauda Equina:  None  Risk of Infection:  None  Risk of Cancer:  None  Risk of Ankylosing Spondylitis:  Onset at age <35, male, AND morning back stiffness. no                       Problem list and histories reviewed & adjusted, as indicated.  Additional history: she reports years of on and off low back pain. 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  Pain is mid low back with some radiation into left buttocks and posterior thigh. She cannot think of any recent strain or injury to explain this pain returning.  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  She has two children aged 10 and 9.  Denies any urinary or bowel problems.  In the past, PT and exercises helped to resolve pain. States muscle relaxants do nothing for her pain and make her  dizzy.    MRI in 2010 was:MRI LUMBAR SPINE WITHOUT CONTRAST  Jun 23, 2010 9:26 AM     HISTORY: Low back pain radiating to the left leg.      COMPARISON: None.     TECHNIQUE: Routine multiplanar, multisequence imaging was performed.     FINDINGS: Sagittal images demonstrate normal vertebral body height.  Bone marrow signal is normal. Five lumbar vertebrae are assumed. Tip  of the the conus medullaris is normal at the T12 level. Axial scans  were performed from L2 to sacrum. Disc levels above this are normal on  sagittal imaging.     L2-L3: Normal.     L3-L4: Normal.     L4-L5: Mild disc bulge. No central or foraminal stenosis. Facet joints  are unremarkable.     L5-S1: No disc herniation or stenosis.      IMPRESSION: At L4-L5, there is mild disc bulge without central or  foraminal stenosis.      Patient Active Problem List   Diagnosis     Lumbago     Tobacco use disorder     Lumbar radicular pain     CARDIOVASCULAR SCREENING; LDL GOAL LESS THAN 160     Endometriosis     UTI (urinary tract infection)     Status post left oophorectomy     Status post hysterectomy     Past Surgical History:   Procedure Laterality Date     C ORAL SURGERY PROCEDURE  6/2007    wisdom teeth     HYSTERECTOMY, PAP NO LONGER INDICATED       LAPAROSCOPIC HYSTERECTOMY TOTAL, BILATERAL SALPINGO-OOPHORECTOMY, COMBINED  5/20/2013    Procedure: COMBINED LAPAROSCOPIC HYSTERECTOMY TOTAL, SALPINGO-OOPHORECTOMY;  Laparoscopic total hysterectomy & bilateral salpingectomy & left oopherectomy & cystoscopy;  Surgeon: Marcus Burks MD;  Location: [facility] OR       Social History   Substance Use Topics     Smoking status: Former Smoker     Packs/day: 0.50     Years: 2.00     Types: Cigarettes     Quit date: 2/1/2008     Smokeless tobacco: Never Used      Comment: occasional smoker     Alcohol use Yes      Comment: minimal; quit since pregnancy     Family History   Problem Relation Age of Onset     Lipids Maternal Grandmother      HEART DISEASE Maternal  Grandmother      Eye Disorder Maternal Grandmother      macular degeneration     DIABETES Maternal Grandmother      Hypertension Maternal Grandmother      Depression Mother      Alcohol/Drug Maternal Grandfather      alcohol     Depression Maternal Grandfather      Colon Cancer Maternal Grandfather      Asthma No family hx of      Breast Cancer No family hx of      Cancer - colorectal No family hx of      Prostate Cancer No family hx of      CEREBROVASCULAR DISEASE No family hx of          Current Outpatient Prescriptions   Medication Sig Dispense Refill     Multiple Vitamins-Minerals (IMMUNE SUPPORT PO) Immune Support       methylPREDNISolone (MEDROL DOSEPAK) 4 MG tablet Follow package instructions 21 tablet 0     IBUPROFEN PO        Multiple Vitamins-Minerals (DAILY MULTI PO) Take  by mouth.       TYLENOL 325 MG OR TABS 1 TO 2 TABLETS BY MOUTH EVERY 4 HOURS AS NEEDED FOR PAIN, DO NOT EXCEED 4,000 MG OF ACETAMINOPHEN IN 24 HOURS       Flibanserin (ADDYI) 100 MG TABS Take 1 tablet by mouth At Bedtime (Patient not taking: Reported on 2/23/2018) 30 tablet 1     scopolamine (TRANSDERM) 72 hr patch Apply 1 patch to hairless area behind one ear at least 4 hours before travel.  Remove old patch and change every 3 days (72 hours). (Patient not taking: Reported on 2/23/2018) 4 patch 0     ondansetron (ZOFRAN) 4 MG tablet Take 1 tablet (4 mg) by mouth every 8 hours as needed for nausea (Patient not taking: Reported on 2/23/2018) 10 tablet 3     scopolamine (TRANSDERM) 72 hr patch Place 1 patch onto the skin every 72 hours.  Apply to hairless area behind one ear at least 4 hours before travel.  Remove old patch and change every 3 days. (Patient not taking: Reported on 2/22/2017) 3 patch 0     Allergies   Allergen Reactions     Nkda [No Known Drug Allergies]      BP Readings from Last 3 Encounters:   02/23/18 113/62   02/22/17 109/71   01/30/17 106/66    Wt Readings from Last 3 Encounters:   02/23/18 151 lb (68.5 kg)  "  02/22/17 148 lb 9.6 oz (67.4 kg)   01/30/17 149 lb (67.6 kg)                    Reviewed and updated as needed this visit by clinical staff  Tobacco  Allergies  Meds  Problems  Med Hx  Surg Hx  Fam Hx  Soc Hx        Reviewed and updated as needed this visit by Provider  Allergies  Meds  Problems         10 point ROS of systems including Constitutional, Eyes, Respiratory, Cardiovascular, Gastroenterology, Genitourinary, Integumentary, Muscularskeletal, Psychiatric were all negative except for pertinent positives noted in my HPI.    OBJECTIVE:     /62 (BP Location: Right arm, Patient Position: Chair, Cuff Size: Adult Regular)  Pulse 80  Temp 98.6  F (37  C) (Oral)  Resp 16  Ht 5' 6.25\" (1.683 m)  Wt 151 lb (68.5 kg)  LMP 04/11/2013  BMI 24.19 kg/m2  Body mass index is 24.19 kg/(m^2).  GENERAL: healthy, alert and no distress  HENT: pharynx without erythema  NECK: no adenopathy, no asymmetry  RESP: lungs clear to auscultation - no rales, rhonchi or wheezes  CV: regular rate and rhythm, normal S1 S2, no S3 or S4, no murmur  ABDOMEN: soft, nontender, no hepatosplenomegaly, no masses and bowel sounds normal, no CVA tenderness  MS: no gross musculoskeletal defects noted, able to heel/toe walk without difficulty, FROM of lumbar spine, no pain with palpation over lower spine or SI joints, mild positive SLR on left, 2 + DTR's of patella      Diagnostic Test Results:  No results found for this or any previous visit (from the past 24 hour(s)).    ASSESSMENT/PLAN:             1. Low back pain with radiation    - methylPREDNISolone (MEDROL DOSEPAK) 4 MG tablet; Follow package instructions  Dispense: 21 tablet; Refill: 0  - XR Lumbar Spine 2/3 Views; Future  - PHYSICAL THERAPY REFERRAL  Discussed how to take the medication(s), expected outcomes, potential side effects.    See Patient Instructions  Patient Instructions   Will let you know about x ray results when available.  Try the steroid pack and PT.  If " no improvement, will consider repeat MRI and back specialist consult.  Follow up if symptoms persist or worsen and as needed.        Thank you for choosing [facility].  You may be receiving a survey in the mail from [employer] regarding your visit today.  Please take a few minutes to complete and return the survey to let us know how we are doing.      Our Clinic hours are:  Mondays    7:20 am - 7 pm  Tues -  Fri  7:20 am - 5 pm    Clinic Phone: [phone]    The clinic lab opens at 7:30 am Mon - Fri and appointments are required.    [facility]  Ph. [phone]  Monday-Thursday 8 am - 7pm  Tues/Wed/Fri 8 am - 5:30 pm             MEGAN Stanley CNP  [facility] [place] [facility] [place]

## 2018-07-30 ENCOUNTER — MYC MEDICAL ADVICE (OUTPATIENT)
Dept: OBGYN | Facility: CLINIC | Age: 33
End: 2018-07-30

## 2018-07-30 ENCOUNTER — TELEPHONE (OUTPATIENT)
Dept: OBGYN | Facility: CLINIC | Age: 33
End: 2018-07-30

## 2018-07-30 DIAGNOSIS — R10.31 RLQ ABDOMINAL PAIN: Primary | ICD-10-CM

## 2018-07-30 NOTE — TELEPHONE ENCOUNTER
Yes to the ULTRASOUND   Marcus Burks     Ordered.  Patient notified through [employer].    Raquel Diaz   Ob[facility]Gyn Clinic  [place]

## 2018-08-08 ENCOUNTER — HOSPITAL ENCOUNTER (OUTPATIENT)
Dept: ULTRASOUND IMAGING | Facility: CLINIC | Age: 33
Discharge: HOME | End: 2018-08-08
Attending: OBSTETRICS & GYNECOLOGY | Admitting: OBSTETRICS & GYNECOLOGY
Payer: COMMERCIAL

## 2018-08-08 DIAGNOSIS — R10.31 RLQ ABDOMINAL PAIN: ICD-10-CM

## 2018-08-08 PROCEDURE — 76856 US EXAM PELVIC COMPLETE: CPT

## 2018-08-09 NOTE — PROGRESS NOTES
Crystal   There is a cyst in your right ovary that may be causing your pain.  I would recommend watching for resolution at this point.  Marcus Burks

## 2018-08-14 ENCOUNTER — TELEPHONE (OUTPATIENT)
Dept: OBGYN | Facility: CLINIC | Age: 33
End: 2018-08-14

## 2018-08-14 NOTE — TELEPHONE ENCOUNTER
"Return call to patient.  Spoke with patient on the phone.    Patient reports she feels the right ovarian cyst identified on US last week may have ruptured.  Patient reports she is still currently having pain - reports pain generally lasts \" a few days\" after the rupture.  Advised patient to come to appointment tomorrow.  Dr. Burks may do imaging here himself otherwise US from last week should be sufficient.    Pt in agreement and reports understanding.    Raquel Diaz   Ob/Gyn Clinic  RN        "

## 2018-08-14 NOTE — TELEPHONE ENCOUNTER
Reason for Call:  Other call back    Detailed comments: Pt has a cyst that she feels has ruptured, she had a US done last week and is coming in tomorrow, she wants to know if she should have more imaging done prior to her appointment tomorrow?    Phone Number Patient can be reached at: Cell number on file:    Telephone Information:   Mobile [phone]       Best Time: today    Can we leave a detailed message on this number? YES    Call taken on 8/14/2018 at 9:18 AM by Ute Marin

## 2018-08-15 ENCOUNTER — OFFICE VISIT (OUTPATIENT)
Dept: OBGYN | Facility: CLINIC | Age: 33
End: 2018-08-15
Payer: COMMERCIAL

## 2018-08-15 ENCOUNTER — TELEPHONE (OUTPATIENT)
Dept: OBGYN | Facility: CLINIC | Age: 33
End: 2018-08-15

## 2018-08-15 VITALS
WEIGHT: 151.4 LBS | DIASTOLIC BLOOD PRESSURE: 64 MMHG | RESPIRATION RATE: 16 BRPM | SYSTOLIC BLOOD PRESSURE: 122 MMHG | HEART RATE: 77 BPM | TEMPERATURE: 97.4 F | HEIGHT: 66 IN | BODY MASS INDEX: 24.33 KG/M2

## 2018-08-15 DIAGNOSIS — Z90.710 STATUS POST HYSTERECTOMY: ICD-10-CM

## 2018-08-15 DIAGNOSIS — N83.201 CYST OF RIGHT OVARY: Primary | ICD-10-CM

## 2018-08-15 DIAGNOSIS — N80.9 ENDOMETRIOSIS: ICD-10-CM

## 2018-08-15 DIAGNOSIS — Z90.721 STATUS POST LEFT OOPHORECTOMY: ICD-10-CM

## 2018-08-15 PROCEDURE — 99213 OFFICE O/P EST LOW 20 MIN: CPT | Performed by: OBSTETRICS & GYNECOLOGY

## 2018-08-15 NOTE — PROGRESS NOTES
CC:  Follow up  from herself for recurrent ovarian cyst/pain on the [facility]  HPI:  Virginia Wood is a 32 year old female is S/p hysterectomy and LSO . She had extensive endometriosis.     Patient's last menstrual period was 04/11/2013.  She has a hx of intolerance of OCPs   She has no interest in Lupron therapy    Patients records are available and reviewed at today's visit.    History reviewed. No pertinent past medical history.    Past Surgical History:   Procedure Laterality Date     C ORAL SURGERY PROCEDURE  6/2007    wisdom teeth     HYSTERECTOMY, PAP NO LONGER INDICATED       LAPAROSCOPIC HYSTERECTOMY TOTAL, BILATERAL SALPINGO-OOPHORECTOMY, COMBINED  5/20/2013    Procedure: COMBINED LAPAROSCOPIC HYSTERECTOMY TOTAL, SALPINGO-OOPHORECTOMY;  Laparoscopic total hysterectomy & bilateral salpingectomy & left oopherectomy & cystoscopy;  Surgeon: Marcus Burks MD;  Location: [facility] OR       Family History   Problem Relation Age of Onset     Lipids Maternal Grandmother      HEART DISEASE Maternal Grandmother      Eye Disorder Maternal Grandmother      macular degeneration     Diabetes Maternal Grandmother      Hypertension Maternal Grandmother      Depression Mother      Alcohol/Drug Maternal Grandfather      alcohol     Depression Maternal Grandfather      Colon Cancer Maternal Grandfather      Asthma No family hx of      Breast Cancer No family hx of      Cancer - colorectal No family hx of      Prostate Cancer No family hx of      Cerebrovascular Disease No family hx of        Allergies: Nkda [no known drug allergies]    Current Outpatient Prescriptions   Medication Sig Dispense Refill     IBUPROFEN PO        Multiple Vitamins-Minerals (DAILY MULTI PO) Take  by mouth.       Multiple Vitamins-Minerals (IMMUNE SUPPORT PO) Immune Support       TYLENOL 325 MG OR TABS 1 TO 2 TABLETS BY MOUTH EVERY 4 HOURS AS NEEDED FOR PAIN, DO NOT EXCEED 4,000 MG OF ACETAMINOPHEN IN 24 HOURS       Flibanserin (ADDYI) 100 MG  "TABS Take 1 tablet by mouth At Bedtime (Patient not taking: Reported on 2/23/2018) 30 tablet 1     ondansetron (ZOFRAN) 4 MG tablet Take 1 tablet (4 mg) by mouth every 8 hours as needed for nausea (Patient not taking: Reported on 2/23/2018) 10 tablet 3     scopolamine (TRANSDERM) 72 hr patch Apply 1 patch to hairless area behind one ear at least 4 hours before travel.  Remove old patch and change every 3 days (72 hours). (Patient not taking: Reported on 2/23/2018) 4 patch 0     scopolamine (TRANSDERM) 72 hr patch Place 1 patch onto the skin every 72 hours.  Apply to hairless area behind one ear at least 4 hours before travel.  Remove old patch and change every 3 days. (Patient not taking: Reported on 2/22/2017) 3 patch 0       ROS:  C: NEGATIVE for fever, chills, change in weight  I: NEGATIVE for worrisome rashes, moles or lesions  E: NEGATIVE for vision changes or irritation  E/M: NEGATIVE for ear, mouth and throat problems  R: NEGATIVE for significant cough or SOB  CV: NEGATIVE for chest pain, palpitations or peripheral edema  GI: NEGATIVE for nausea, abdominal pain, heartburn, or change in bowel habits  : NEGATIVE for frequency, dysuria, hematuria, vaginal discharge  M: NEGATIVE for significant arthralgias or myalgia  N: NEGATIVE for weakness, dizziness or paresthesias  E: NEGATIVE for temperature intolerance, skin/hair changes  P: NEGATIVE for changes in mood or affect    EXAM:  Blood pressure 122/64, pulse 77, temperature 97.4  F (36.3  C), resp. rate 16, height 5' 6.25\" (1.683 m), weight 151 lb 6.4 oz (68.7 kg), last menstrual period 04/11/2013, not currently breastfeeding.   BMI= Body mass index is 24.25 kg/(m^2).  General - pleasant female in no acute distress.  No exam today      ASSESSMENT/PLAN:  (N83.201) Cyst of right ovary  (primary encounter diagnosis)  Comment: recurrent pain  Suppression options reviewed and declined    Plan: [facility]-Operative Worksheet GYN        Laparoscopic right " oophorectomy    Discussed HRT    (N80.9) Endometriosis  (Z90.710) Status post hysterectomy  (Z90.721) Status post left oophorectomy    I described the procedures as indicated above. The types of anesthesia were reviewed. The pre and post-op expectations were noted. We discussed the risks and benefits of the above procedures. The risk of bleeding, infection and damage to other organs was reviewed. The possibility of much larger incision(s) was discussed.  No guarantees were made.  She did express understanding and desires to proceed with surgery.    Letter will be sent to the referring provider.    Marcus Burks  15 of 15 minutes spent in counseling

## 2018-08-15 NOTE — TELEPHONE ENCOUNTER
Type of surgery: oophorectomy  Location of surgery: [facility]  Date and time of surgery: 8/27/18 @ 3:20PM  Surgeon: ALEKSANDAR  Pre-Op Appt Date: pt will schedule  Post-Op Appt Date: 6 weeks   Packet sent out: Yes  Pre-cert/Authorization completed:  No  Date: 8/15/18    Ute [facility] [place]

## 2018-09-05 ENCOUNTER — OFFICE VISIT (OUTPATIENT)
Dept: FAMILY MEDICINE | Facility: CLINIC | Age: 33
End: 2018-09-05
Payer: COMMERCIAL

## 2018-09-05 VITALS
HEIGHT: 66 IN | TEMPERATURE: 98.2 F | HEART RATE: 82 BPM | RESPIRATION RATE: 18 BRPM | BODY MASS INDEX: 24.27 KG/M2 | DIASTOLIC BLOOD PRESSURE: 56 MMHG | OXYGEN SATURATION: 99 % | SYSTOLIC BLOOD PRESSURE: 114 MMHG | WEIGHT: 151 LBS

## 2018-09-05 DIAGNOSIS — Z98.890 POSTOPERATIVE NAUSEA: ICD-10-CM

## 2018-09-05 DIAGNOSIS — N80.9 ENDOMETRIOSIS: ICD-10-CM

## 2018-09-05 DIAGNOSIS — N83.201 OVARIAN CYST, RIGHT: ICD-10-CM

## 2018-09-05 DIAGNOSIS — Z01.818 PREOP GENERAL PHYSICAL EXAM: Primary | ICD-10-CM

## 2018-09-05 DIAGNOSIS — R11.0 POSTOPERATIVE NAUSEA: ICD-10-CM

## 2018-09-05 PROCEDURE — 99214 OFFICE O/P EST MOD 30 MIN: CPT | Performed by: FAMILY MEDICINE

## 2018-09-05 RX ORDER — SCOLOPAMINE TRANSDERMAL SYSTEM 1 MG/1
PATCH, EXTENDED RELEASE TRANSDERMAL
Qty: 1 PATCH | Refills: 0 | Status: SHIPPED | OUTPATIENT
Start: 2018-09-05 | End: 2018-10-24

## 2018-09-05 ASSESSMENT — PAIN SCALES - GENERAL: PAINLEVEL: MILD PAIN (2)

## 2018-09-05 NOTE — PROGRESS NOTES
[facility] [place]  [address]  [facility] [address]  [phone]  Dept: [phone]    PRE-OP EVALUATION:  Today's date: 2018    Virginia Wood (: 1985) presents for pre-operative evaluation assessment as requested by Dr. Burks.  She requires evaluation and anesthesia risk assessment prior to undergoing surgery/procedure for treatment of ovary .    Proposed Surgery/ Procedure: Laparoscopic right oophorectomy  Date of Surgery/ Procedure: 9/10/18  Time of Surgery/ Procedure: 11AM  Hospital/Surgical Facility: [facility]  Primary Physician: Ute Stearns  Type of Anesthesia Anticipated: General    Patient has a Health Care Directive or Living Will:  NO    1. NO - Do you have a history of heart attack, stroke, stent, bypass or surgery on an artery in the head, neck, heart or legs?  2. NO - Do you ever have any pain or discomfort in your chest?  3. NO - Do you have a history of  Heart Failure?  4. NO - Are you troubled by shortness of breath when: walking on the level, up a slight hill or at night?  5. NO - Do you currently have a cold, bronchitis or other respiratory infection?  6. NO - Do you have a cough, shortness of breath or wheezing?  7. NO - Do you sometimes get pains in the calves of your legs when you walk?  8. NO - Do you or anyone in your family have previous history of blood clots?  9. NO - Do you or does anyone in your family have a serious bleeding problem such as prolonged bleeding following surgeries or cuts?  10. NO - Have you ever had problems with anemia or been told to take iron pills?  11. NO - Have you had any abnormal blood loss such as black, tarry or bloody stools, or abnormal vaginal bleeding?  12. NO - Have you ever had a blood transfusion?  13. YES - Have you or any of your relatives ever had problems with anesthesia? nausea  14. NO - Do you have sleep apnea, excessive snoring or daytime drowsiness?  15. NO - Do you have any prosthetic heart  valves?  16. NO - Do you have prosthetic joints?  17. NO - Is there any chance that you may be pregnant?      HPI:     HPI related to upcoming procedure: recurrent cyst of the right ovary.  She has a h/o endometriosis and is s/p LSO and hysterectomy.  She has been intolerant of OCPs and declined Lupron therapy.        See problem list for active medical problems.  Problems all longstanding and stable, except as noted/documented.  See ROS for pertinent symptoms related to these conditions.                                                                                                                                                          .    MEDICAL HISTORY:     Patient Active Problem List    Diagnosis Date Noted     Endometriosis 05/09/2013     Priority: High     Status post left oophorectomy 07/02/2013     Priority: Medium     Status post hysterectomy 07/02/2013     Priority: Medium     UTI (urinary tract infection) 05/30/2013     Priority: Medium     CARDIOVASCULAR SCREENING; LDL GOAL LESS THAN 160 10/31/2010     Priority: Medium     Lumbar radicular pain 09/14/2010     Priority: Medium     Work comp  -SAMUEL Alvarenga/RN with [employer]  [phone]/fax [phone]       Lumbago 06/02/2005     Priority: Medium      History reviewed. No pertinent past medical history.  Past Surgical History:   Procedure Laterality Date     C ORAL SURGERY PROCEDURE  6/2007    wisdom teeth     HYSTERECTOMY, PAP NO LONGER INDICATED       LAPAROSCOPIC HYSTERECTOMY TOTAL, BILATERAL SALPINGO-OOPHORECTOMY, COMBINED  5/20/2013    Procedure: COMBINED LAPAROSCOPIC HYSTERECTOMY TOTAL, SALPINGO-OOPHORECTOMY;  Laparoscopic total hysterectomy & bilateral salpingectomy & left oopherectomy & cystoscopy;  Surgeon: Marcus Burks MD;  Location: [facility] OR     Current Outpatient Prescriptions   Medication Sig Dispense Refill     IBUPROFEN PO        Multiple Vitamins-Minerals (DAILY MULTI PO) Take  by mouth.       Multiple  "Vitamins-Minerals (IMMUNE SUPPORT PO) Immune Support       scopolamine (TRANSDERM) 72 hr patch Apply 1 patch to hairless area behind one ear at least 4 hours before travel.  Remove old patch and change every 3 days (72 hours). 1 patch 0     TYLENOL 325 MG OR TABS 1 TO 2 TABLETS BY MOUTH EVERY 4 HOURS AS NEEDED FOR PAIN, DO NOT EXCEED 4,000 MG OF ACETAMINOPHEN IN 24 HOURS       OTC products: None, except as noted above    Allergies   Allergen Reactions     Nkda [No Known Drug Allergies]       Latex Allergy: NO    Social History   Substance Use Topics     Smoking status: Former Smoker     Packs/day: 0.50     Years: 2.00     Types: Cigarettes     Quit date: 2/1/2008     Smokeless tobacco: Never Used      Comment: occasional smoker     Alcohol use Yes      Comment: minimal; quit since pregnancy     History   Drug Use No       REVIEW OF SYSTEMS:   CONSTITUTIONAL: NEGATIVE for fever, chills, change in weight  ENT/MOUTH: feels like there is a constant lump in her throat.  More noticeable some days than others.    RESP: NEGATIVE for significant cough or SOB  CV: NEGATIVE for chest pain, palpitations or peripheral edema  : see HPI      EXAM:   /56  Pulse 82  Temp 98.2  F (36.8  C) (Tympanic)  Resp 18  Ht 5' 6.25\" (1.683 m)  Wt 151 lb (68.5 kg)  LMP 04/11/2013  SpO2 99%  Breastfeeding? No  BMI 24.19 kg/m2  GENERAL APPEARANCE: healthy, alert and no distress  HENT: ear canals and TM's normal and nose and mouth without ulcers or lesions  RESP: lungs clear to auscultation - no rales, rhonchi or wheezes  CV: regular rate and rhythm, normal S1 S2, no S3 or S4 and no murmur, click or rub   ABDOMEN: soft, nontender, no HSM or masses and bowel sounds normal  NEURO: Normal strength and tone, sensory exam grossly normal, mentation intact and speech normal    DIAGNOSTICS:   No labs or EKG required for low risk surgery (cataract, skin procedure, breast biopsy, etc)    Recent Labs   Lab Test  05/15/14   1310  06/03/13   " 1030   03/28/13   1352   HGB  12.3  12.6   < >  12.7   PLT  256  408   < >  251   NA  140   --    --   139   POTASSIUM  4.6   --    --   3.6   CR  0.72   --    --   0.59    < > = values in this interval not displayed.        IMPRESSION:   Reason for surgery/procedure: right ovarian cyst and enodmetriosos  Diagnosis/reason for consult: preoperative evaluation and medical risk assessment    The proposed surgical procedure is considered LOW risk.    REVISED CARDIAC RISK INDEX  The patient has the following serious cardiovascular risks for perioperative complications such as (MI, PE, VFib and 3  AV Block):  No serious cardiac risks  INTERPRETATION: 0 risks: Class I (very low risk - 0.4% complication rate)    The patient has the following additional risks for perioperative complications:  No identified additional risks      ICD-10-CM    1. Preop general physical exam Z01.818 scopolamine (TRANSDERM) 72 hr patch   2. Ovarian cyst, right N83.201    3. Endometriosis N80.9    4. Postoperative nausea R11.0     Z98.890        RECOMMENDATIONS:         --Patient is to take all scheduled medications on the day of surgery EXCEPT for modifications listed below.    APPROVAL GIVEN to proceed with proposed procedure, without further diagnostic evaluation       Signed Electronically by: Ute Stearns MD    Copy of this evaluation report is provided to requesting physician.    [facility] Preop Guidelines    Revised Cardiac Risk Index

## 2018-09-05 NOTE — MR AVS SNAPSHOT
After Visit Summary   9/5/2018    Virginia Wood    MRN: [id number]           Patient Information     Date Of Birth          1985        Visit Information        Provider Department      9/5/2018 1:00 PM Ute Stearns MD [facility]        Today's Diagnoses     Preop general physical exam    -  1    Ovarian cyst, right        Endometriosis        Postoperative nausea          Care Instructions          Thank you for choosing [facility].  You may be receiving a survey in the mail from "[employer] regarding your visit today.  Please take a few minutes to complete and return the survey to let us know how we are doing.      Our Clinic hours are:  Mondays    7:20 am - 7 pm  Tues -  Fri  7:20 am - 5 pm    Clinic Phone: [phone]    The clinic lab opens at 7:30 am Mon - Fri and appointments are required.    [facility]. [phone]  Monday  8 am - 7pm  Tues - Fri 8 am - 5:30 pm         Before Your Surgery      Call your surgeon if there is any change in your health. This includes signs of a cold or flu (such as a sore throat, runny nose, cough, rash or fever).    Do not smoke, drink alcohol or take over the counter medicine (unless your surgeon or primary care doctor tells you to) for the 24 hours before and after surgery.    If you take prescribed drugs: Follow your doctor s orders about which medicines to take and which to stop until after surgery.    Eating and drinking prior to surgery: follow the instructions from your surgeon    Take a shower or bath the night before surgery. Use the soap your surgeon gave you to gently clean your skin. If you do not have soap from your surgeon, use your regular soap. Do not shave or scrub the surgery site.  Wear clean pajamas and have clean sheets on your bed.           Follow-ups after your visit        Your next 10 appointments already scheduled     Sep 10, 2018   Procedure with Marcus Burks MD  "  [facility]    [address] [address]   [phone]           The medical center is located at [address]. (between [address] in [place], [place] miles north of [place]).              Who to contact     If you have questions or need follow up information about today's clinic visit or your schedule please contact [facility] directly at [phone].  Normal or non-critical lab and imaging results will be communicated to you by "[employer]hart, letter or phone within 4 business days after the clinic has received the results. If you do not hear from us within 7 days, please contact the clinic through "[employer]"t or phone. If you have a critical or abnormal lab result, we will notify you by phone as soon as possible.  Submit refill requests through [employer] or call your pharmacy and they will forward the refill request to us. Please allow 3 business days for your refill to be completed.          Additional Information About Your Visit        [employer] Information     [employer] gives you secure access to your electronic health record. If you see a primary care provider, you can also send messages to your care team and make appointments. If you have questions, please call your primary care clinic.  If you do not have a primary care provider, please call [phone] and they will assist you.        Care EveryWhere ID     This is your Care EveryWhere ID. This could be used by other organizations to access your [facility] medical records  [id number]        Your Vitals Were     Pulse Temperature Respirations Height Last Period Pulse Oximetry    82 98.2  F (36.8  C) (Tympanic) 18 5' 6.25\" (1.683 m) 04/11/2013 99%    Breastfeeding? BMI (Body Mass Index)                No 24.19 kg/m2           Blood Pressure from Last 3 Encounters:   09/05/18 114/56   08/15/18 122/64   02/23/18 113/62    Weight from Last 3 Encounters:   09/05/18 151 lb (68.5 kg)   08/15/18 151 " lb 6.4 oz (68.7 kg)   02/23/18 151 lb (68.5 kg)              Today, you had the following     No orders found for display         Today's Medication Changes          These changes are accurate as of 9/5/18  1:13 PM.  If you have any questions, ask your nurse or doctor.               Start taking these medicines.        Dose/Directions    scopolamine 72 hr patch   Commonly known as:  TRANSDERM   Used for:  Preop general physical exam   Started by:  Ute Stearns MD        Apply 1 patch to hairless area behind one ear at least 4 hours before travel.  Remove old patch and change every 3 days (72 hours).   Quantity:  1 patch   Refills:  0            Where to get your medicines      These medications were sent to [facility] [address]  [address] [facility] [address]     Phone:  [phone]     scopolamine 72 hr patch                Primary Care Provider Office Phone # Fax #    Ute Stearns [phone] [address] [facility] [address]        Equal Access to Services     [facility] Center[facility]A AH: Hadii aad ku hadasho Soomaali, waaxda luqadaha, qaybta kaalmada adeegyada, hong garner . So [facility] [phone].    ATENCIÓN: Si habla español, tiene a clinton disposición servicios gratsaritaos de asistencia lingüística. Ll[facility] [phone].    We comply with applicable federal civil rights laws and [place] laws. We do not discriminate on the basis of race, color, national origin, age, disability, sex, sexual orientation, or gender identity.            Thank you!     Thank you for choosing [facility]  for your care. Our goal is always to provide you with excellent care. Hearing back from our patients is one way we can continue to improve our services. Please take a few minutes to complete the written survey that you may receive in the mail after your visit with us. Thank you!              Your Updated Medication List - Protect others around you: Learn how to safely use, store and throw away your medicines at www.disposemymeds.org.          This list is accurate as of 9/5/18  1:13 PM.  Always use your most recent med list.                   Brand Name Dispense Instructions for use Diagnosis    * DAILY MULTI PO      Take  by mouth.        * IMMUNE SUPPORT PO      Immune Support        IBUPROFEN PO           scopolamine 72 hr patch    TRANSDERM    1 patch    Apply 1 patch to hairless area behind one ear at least 4 hours before travel.  Remove old patch and change every 3 days (72 hours).    Preop general physical exam       TYLENOL 325 MG tablet   Generic drug:  acetaminophen      1 TO 2 TABLETS BY MOUTH EVERY 4 HOURS AS NEEDED FOR PAIN, DO NOT EXCEED 4,000 MG OF ACETAMINOPHEN IN 24 HOURS        * Notice:  This list has 2 medication(s) that are the same as other medications prescribed for you. Read the directions carefully, and ask your doctor or other care provider to review them with you.

## 2018-09-05 NOTE — PATIENT INSTRUCTIONS
Thank you for choosing [facility].  You may be receiving a survey in the mail from Ryan Ferro regarding your visit today.  Please take a few minutes to complete and return the survey to let us know how we are doing.      Our Clinic hours are:  Mondays    7:20 am - 7 pm  Tues - Fri  7:20 am - 5 pm    Clinic Phone: [phone]    The clinic lab opens at 7:30 am Mon - Fri and appointments are required.    [facility]. [phone]  Monday  8 am - 7pm  Tues - Fri 8 am - 5:30 pm         Before Your Surgery      Call your surgeon if there is any change in your health. This includes signs of a cold or flu (such as a sore throat, runny nose, cough, rash or fever).    Do not smoke, drink alcohol or take over the counter medicine (unless your surgeon or primary care doctor tells you to) for the 24 hours before and after surgery.    If you take prescribed drugs: Follow your doctor s orders about which medicines to take and which to stop until after surgery.    Eating and drinking prior to surgery: follow the instructions from your surgeon    Take a shower or bath the night before surgery. Use the soap your surgeon gave you to gently clean your skin. If you do not have soap from your surgeon, use your regular soap. Do not shave or scrub the surgery site.  Wear clean pajamas and have clean sheets on your bed.

## 2018-09-07 ENCOUNTER — ANESTHESIA EVENT (OUTPATIENT)
Dept: SURGERY | Facility: CLINIC | Age: 33
End: 2018-09-07
Payer: COMMERCIAL

## 2018-09-08 ASSESSMENT — LIFESTYLE VARIABLES: TOBACCO_USE: 1

## 2018-09-08 NOTE — ANESTHESIA PREPROCEDURE EVALUATION
"  Anesthesia Evaluation     . Pt has had prior anesthetic. Type: General    History of anesthetic complications   - PONV        ROS/MED HX    ENT/Pulmonary: Comment: \"feels like lump in throat\"    (+)tobacco use, Past use , . .    Neurologic:  - neg neurologic ROS     Cardiovascular: Comment: History of sinus tach    (+) ----. : . . . :. . Previous cardiac testing date:results:date: results:ECG reviewed date:8-24-09 results:Sinus Rhythm -With rate variation   P:QRS - 1:1, Normal P axis, H Rate 72   cv = 15.  WITHIN NORMAL LIMITS date: results:          METS/Exercise Tolerance:  >4 METS   Hematologic:  - neg hematologic  ROS       Musculoskeletal: Comment: Lumbago  Lumbar radicular pain        GI/Hepatic:  - neg GI/hepatic ROS      (-) liver disease   Renal/Genitourinary: Comment: UTIs        Endo:  - neg endo ROS       Psychiatric:  - neg psychiatric ROS       Infectious Disease:  - neg infectious disease ROS       Malignancy:      - no malignancy   Other: Comment: Endometriosis  Left ovarian cyst                    Physical Exam  Normal systems: cardiovascular, pulmonary and dental    Airway   Mallampati: II    Dental     Cardiovascular       Pulmonary                     Anesthesia Plan      History & Physical Review  History and physical reviewed and following examination; no interval change.    ASA Status:  2 .    NPO Status:  > 6 hours    Plan for General with Intravenous and Propofol induction. Maintenance will be Inhalation and Balanced.    PONV prophylaxis:  Ondansetron (or other 5HT-3) and Dexamethasone or Solumedrol  Additional equipment: Videolaryngoscope      Postoperative Care  Postoperative pain management:  IV analgesics.      Consents  Anesthetic plan, risks, benefits and alternatives discussed with:  Patient..                          .  "

## 2018-09-09 PROBLEM — N83.201 RIGHT OVARIAN CYST: Status: ACTIVE | Noted: 2018-09-09

## 2018-09-10 ENCOUNTER — HOSPITAL ENCOUNTER (OUTPATIENT)
Facility: CLINIC | Age: 33
Discharge: HOME | End: 2018-09-10
Attending: OBSTETRICS & GYNECOLOGY | Admitting: OBSTETRICS & GYNECOLOGY
Payer: COMMERCIAL

## 2018-09-10 ENCOUNTER — ANESTHESIA (OUTPATIENT)
Dept: SURGERY | Facility: CLINIC | Age: 33
End: 2018-09-10
Payer: COMMERCIAL

## 2018-09-10 VITALS
HEIGHT: 66 IN | DIASTOLIC BLOOD PRESSURE: 72 MMHG | TEMPERATURE: 97.7 F | RESPIRATION RATE: 20 BRPM | HEART RATE: 73 BPM | BODY MASS INDEX: 23.46 KG/M2 | WEIGHT: 146 LBS | OXYGEN SATURATION: 97 % | SYSTOLIC BLOOD PRESSURE: 112 MMHG

## 2018-09-10 DIAGNOSIS — E89.40 SURGICAL MENOPAUSE: ICD-10-CM

## 2018-09-10 DIAGNOSIS — Z98.890 S/P LAPAROSCOPIC SURGERY: Primary | ICD-10-CM

## 2018-09-10 PROCEDURE — 25000128 H RX IP 250 OP 636: Performed by: NURSE ANESTHETIST, CERTIFIED REGISTERED

## 2018-09-10 PROCEDURE — 36000056 ZZH SURGERY LEVEL 3 1ST 30 MIN: Performed by: OBSTETRICS & GYNECOLOGY

## 2018-09-10 PROCEDURE — 27210794 ZZH OR GENERAL SUPPLY STERILE: Performed by: OBSTETRICS & GYNECOLOGY

## 2018-09-10 PROCEDURE — 25000125 ZZHC RX 250: Performed by: OBSTETRICS & GYNECOLOGY

## 2018-09-10 PROCEDURE — 27110028 ZZH OR GENERAL SUPPLY NON-STERILE: Performed by: OBSTETRICS & GYNECOLOGY

## 2018-09-10 PROCEDURE — 58661 LAPAROSCOPY REMOVE ADNEXA: CPT | Mod: GC | Performed by: OBSTETRICS & GYNECOLOGY

## 2018-09-10 PROCEDURE — 71000027 ZZH RECOVERY PHASE 2 EACH 15 MINS: Performed by: OBSTETRICS & GYNECOLOGY

## 2018-09-10 PROCEDURE — 25800025 ZZH RX 258: Performed by: OBSTETRICS & GYNECOLOGY

## 2018-09-10 PROCEDURE — 71000014 ZZH RECOVERY PHASE 1 LEVEL 2 FIRST HR: Performed by: OBSTETRICS & GYNECOLOGY

## 2018-09-10 PROCEDURE — 37000008 ZZH ANESTHESIA TECHNICAL FEE, 1ST 30 MIN: Performed by: OBSTETRICS & GYNECOLOGY

## 2018-09-10 PROCEDURE — 40000305 ZZH STATISTIC PRE PROC ASSESS I: Performed by: OBSTETRICS & GYNECOLOGY

## 2018-09-10 PROCEDURE — 36000058 ZZH SURGERY LEVEL 3 EA 15 ADDTL MIN: Performed by: OBSTETRICS & GYNECOLOGY

## 2018-09-10 PROCEDURE — 88305 TISSUE EXAM BY PATHOLOGIST: CPT | Mod: 26 | Performed by: OBSTETRICS & GYNECOLOGY

## 2018-09-10 PROCEDURE — 71000015 ZZH RECOVERY PHASE 1 LEVEL 2 EA ADDTL HR: Performed by: OBSTETRICS & GYNECOLOGY

## 2018-09-10 PROCEDURE — 25000125 ZZHC RX 250: Performed by: NURSE ANESTHETIST, CERTIFIED REGISTERED

## 2018-09-10 PROCEDURE — 25000128 H RX IP 250 OP 636: Performed by: OBSTETRICS & GYNECOLOGY

## 2018-09-10 PROCEDURE — 88305 TISSUE EXAM BY PATHOLOGIST: CPT | Performed by: OBSTETRICS & GYNECOLOGY

## 2018-09-10 PROCEDURE — 25000564 ZZH DESFLURANE, EA 15 MIN: Performed by: OBSTETRICS & GYNECOLOGY

## 2018-09-10 PROCEDURE — 37000009 ZZH ANESTHESIA TECHNICAL FEE, EACH ADDTL 15 MIN: Performed by: OBSTETRICS & GYNECOLOGY

## 2018-09-10 RX ORDER — HYDROMORPHONE HYDROCHLORIDE 1 MG/ML
.3-.5 INJECTION, SOLUTION INTRAMUSCULAR; INTRAVENOUS; SUBCUTANEOUS EVERY 10 MIN PRN
Status: DISCONTINUED | OUTPATIENT
Start: 2018-09-10 | End: 2018-09-10 | Stop reason: HOSPADM

## 2018-09-10 RX ORDER — DEXAMETHASONE SODIUM PHOSPHATE 4 MG/ML
INJECTION, SOLUTION INTRA-ARTICULAR; INTRALESIONAL; INTRAMUSCULAR; INTRAVENOUS; SOFT TISSUE PRN
Status: DISCONTINUED | OUTPATIENT
Start: 2018-09-10 | End: 2018-09-10

## 2018-09-10 RX ORDER — NALOXONE HYDROCHLORIDE 0.4 MG/ML
.1-.4 INJECTION, SOLUTION INTRAMUSCULAR; INTRAVENOUS; SUBCUTANEOUS
Status: DISCONTINUED | OUTPATIENT
Start: 2018-09-10 | End: 2018-09-10 | Stop reason: HOSPADM

## 2018-09-10 RX ORDER — MEPERIDINE HYDROCHLORIDE 25 MG/ML
12.5 INJECTION INTRAMUSCULAR; INTRAVENOUS; SUBCUTANEOUS
Status: DISCONTINUED | OUTPATIENT
Start: 2018-09-10 | End: 2018-09-10 | Stop reason: HOSPADM

## 2018-09-10 RX ORDER — FENTANYL CITRATE 50 UG/ML
INJECTION, SOLUTION INTRAMUSCULAR; INTRAVENOUS PRN
Status: DISCONTINUED | OUTPATIENT
Start: 2018-09-10 | End: 2018-09-10

## 2018-09-10 RX ORDER — BUPIVACAINE HYDROCHLORIDE 2.5 MG/ML
INJECTION, SOLUTION INFILTRATION; PERINEURAL PRN
Status: DISCONTINUED | OUTPATIENT
Start: 2018-09-10 | End: 2018-09-10 | Stop reason: HOSPADM

## 2018-09-10 RX ORDER — HYDROCODONE BITARTRATE AND ACETAMINOPHEN 5; 325 MG/1; MG/1
1 TABLET ORAL
Status: DISCONTINUED | OUTPATIENT
Start: 2018-09-10 | End: 2018-09-10 | Stop reason: HOSPADM

## 2018-09-10 RX ORDER — ONDANSETRON 4 MG/1
4 TABLET, ORALLY DISINTEGRATING ORAL EVERY 30 MIN PRN
Status: DISCONTINUED | OUTPATIENT
Start: 2018-09-10 | End: 2018-09-10 | Stop reason: HOSPADM

## 2018-09-10 RX ORDER — ONDANSETRON 2 MG/ML
INJECTION INTRAMUSCULAR; INTRAVENOUS PRN
Status: DISCONTINUED | OUTPATIENT
Start: 2018-09-10 | End: 2018-09-10

## 2018-09-10 RX ORDER — IBUPROFEN 200 MG
600 TABLET ORAL EVERY 6 HOURS PRN
COMMUNITY
Start: 2018-09-10 | End: 2024-02-09

## 2018-09-10 RX ORDER — FENTANYL CITRATE 50 UG/ML
25-50 INJECTION, SOLUTION INTRAMUSCULAR; INTRAVENOUS
Status: DISCONTINUED | OUTPATIENT
Start: 2018-09-10 | End: 2018-09-10 | Stop reason: HOSPADM

## 2018-09-10 RX ORDER — NEOSTIGMINE METHYLSULFATE 1 MG/ML
VIAL (ML) INJECTION PRN
Status: DISCONTINUED | OUTPATIENT
Start: 2018-09-10 | End: 2018-09-10

## 2018-09-10 RX ORDER — ONDANSETRON 2 MG/ML
4 INJECTION INTRAMUSCULAR; INTRAVENOUS EVERY 30 MIN PRN
Status: DISCONTINUED | OUTPATIENT
Start: 2018-09-10 | End: 2018-09-10 | Stop reason: HOSPADM

## 2018-09-10 RX ORDER — SODIUM CHLORIDE, SODIUM LACTATE, POTASSIUM CHLORIDE, CALCIUM CHLORIDE 600; 310; 30; 20 MG/100ML; MG/100ML; MG/100ML; MG/100ML
INJECTION, SOLUTION INTRAVENOUS CONTINUOUS
Status: DISCONTINUED | OUTPATIENT
Start: 2018-09-10 | End: 2018-09-10 | Stop reason: HOSPADM

## 2018-09-10 RX ORDER — CEFAZOLIN SODIUM 2 G/100ML
2 INJECTION, SOLUTION INTRAVENOUS
Status: COMPLETED | OUTPATIENT
Start: 2018-09-10 | End: 2018-09-10

## 2018-09-10 RX ORDER — BUPIVACAINE HYDROCHLORIDE AND EPINEPHRINE 5; 5 MG/ML; UG/ML
INJECTION, SOLUTION PERINEURAL PRN
Status: DISCONTINUED | OUTPATIENT
Start: 2018-09-10 | End: 2018-09-10 | Stop reason: HOSPADM

## 2018-09-10 RX ORDER — MEPERIDINE HYDROCHLORIDE 50 MG/ML
12.5 INJECTION INTRAMUSCULAR; INTRAVENOUS; SUBCUTANEOUS ONCE
Status: COMPLETED | OUTPATIENT
Start: 2018-09-10 | End: 2018-09-10

## 2018-09-10 RX ORDER — CEFAZOLIN SODIUM 1 G/50ML
1 INJECTION, SOLUTION INTRAVENOUS SEE ADMIN INSTRUCTIONS
Status: DISCONTINUED | OUTPATIENT
Start: 2018-09-10 | End: 2018-09-10 | Stop reason: HOSPADM

## 2018-09-10 RX ORDER — GLYCOPYRROLATE 0.2 MG/ML
INJECTION, SOLUTION INTRAMUSCULAR; INTRAVENOUS PRN
Status: DISCONTINUED | OUTPATIENT
Start: 2018-09-10 | End: 2018-09-10

## 2018-09-10 RX ORDER — KETOROLAC TROMETHAMINE 30 MG/ML
30 INJECTION, SOLUTION INTRAMUSCULAR; INTRAVENOUS ONCE
Status: COMPLETED | OUTPATIENT
Start: 2018-09-10 | End: 2018-09-10

## 2018-09-10 RX ORDER — ESTRADIOL 1 MG/1
TABLET ORAL
Qty: 30 TABLET | Refills: 0 | Status: SHIPPED | OUTPATIENT
Start: 2018-09-10 | End: 2018-10-07

## 2018-09-10 RX ORDER — HYDROCODONE BITARTRATE AND ACETAMINOPHEN 5; 325 MG/1; MG/1
1-2 TABLET ORAL EVERY 4 HOURS PRN
Qty: 20 TABLET | Refills: 0 | Status: SHIPPED | OUTPATIENT
Start: 2018-09-10 | End: 2018-09-13

## 2018-09-10 RX ORDER — SCOLOPAMINE TRANSDERMAL SYSTEM 1 MG/1
1 PATCH, EXTENDED RELEASE TRANSDERMAL ONCE
Status: DISCONTINUED | OUTPATIENT
Start: 2018-09-10 | End: 2018-09-10 | Stop reason: HOSPADM

## 2018-09-10 RX ORDER — FUROSEMIDE 10 MG/ML
INJECTION INTRAMUSCULAR; INTRAVENOUS PRN
Status: DISCONTINUED | OUTPATIENT
Start: 2018-09-10 | End: 2018-09-10

## 2018-09-10 RX ORDER — IBUPROFEN 600 MG/1
600 TABLET, FILM COATED ORAL
Status: DISCONTINUED | OUTPATIENT
Start: 2018-09-10 | End: 2018-09-10 | Stop reason: HOSPADM

## 2018-09-10 RX ORDER — PROPOFOL 10 MG/ML
INJECTION, EMULSION INTRAVENOUS PRN
Status: DISCONTINUED | OUTPATIENT
Start: 2018-09-10 | End: 2018-09-10

## 2018-09-10 RX ORDER — LIDOCAINE 40 MG/G
CREAM TOPICAL
Status: DISCONTINUED | OUTPATIENT
Start: 2018-09-10 | End: 2018-09-10 | Stop reason: HOSPADM

## 2018-09-10 RX ADMIN — ONDANSETRON 4 MG: 2 INJECTION INTRAMUSCULAR; INTRAVENOUS at 12:23

## 2018-09-10 RX ADMIN — ROCURONIUM BROMIDE 35 MG: 10 INJECTION INTRAVENOUS at 12:29

## 2018-09-10 RX ADMIN — Medication 0.5 MG: at 14:49

## 2018-09-10 RX ADMIN — PROCHLORPERAZINE EDISYLATE 10 MG: 5 INJECTION INTRAMUSCULAR; INTRAVENOUS at 16:15

## 2018-09-10 RX ADMIN — DEXAMETHASONE SODIUM PHOSPHATE 4 MG: 4 INJECTION, SOLUTION INTRA-ARTICULAR; INTRALESIONAL; INTRAMUSCULAR; INTRAVENOUS; SOFT TISSUE at 12:23

## 2018-09-10 RX ADMIN — PROPOFOL 180 MG: 10 INJECTION, EMULSION INTRAVENOUS at 12:29

## 2018-09-10 RX ADMIN — MEPERIDINE HYDROCHLORIDE 12.5 MG: 25 INJECTION, SOLUTION INTRAMUSCULAR; INTRAVENOUS; SUBCUTANEOUS at 14:38

## 2018-09-10 RX ADMIN — Medication 0.5 MG: at 14:37

## 2018-09-10 RX ADMIN — KETOROLAC TROMETHAMINE 30 MG: 30 INJECTION, SOLUTION INTRAMUSCULAR at 10:33

## 2018-09-10 RX ADMIN — FENTANYL CITRATE 100 MCG: 50 INJECTION, SOLUTION INTRAMUSCULAR; INTRAVENOUS at 12:20

## 2018-09-10 RX ADMIN — SODIUM CHLORIDE, POTASSIUM CHLORIDE, SODIUM LACTATE AND CALCIUM CHLORIDE: 600; 310; 30; 20 INJECTION, SOLUTION INTRAVENOUS at 10:32

## 2018-09-10 RX ADMIN — MEPERIDINE HYDROCHLORIDE 12.5 MG: 50 INJECTION, SOLUTION INTRAMUSCULAR; INTRAVENOUS; SUBCUTANEOUS at 14:22

## 2018-09-10 RX ADMIN — GLYCOPYRROLATE 0.2 MG: 0.2 INJECTION, SOLUTION INTRAMUSCULAR; INTRAVENOUS at 12:23

## 2018-09-10 RX ADMIN — Medication 2 TABLET: at 16:56

## 2018-09-10 RX ADMIN — SODIUM CHLORIDE, POTASSIUM CHLORIDE, SODIUM LACTATE AND CALCIUM CHLORIDE: 600; 310; 30; 20 INJECTION, SOLUTION INTRAVENOUS at 14:32

## 2018-09-10 RX ADMIN — Medication 0.5 MG: at 14:25

## 2018-09-10 RX ADMIN — FENTANYL CITRATE 100 MCG: 50 INJECTION, SOLUTION INTRAMUSCULAR; INTRAVENOUS at 12:54

## 2018-09-10 RX ADMIN — GLYCOPYRROLATE 0.7 MG: 0.2 INJECTION, SOLUTION INTRAMUSCULAR; INTRAVENOUS at 13:51

## 2018-09-10 RX ADMIN — MIDAZOLAM 2 MG: 1 INJECTION INTRAMUSCULAR; INTRAVENOUS at 12:20

## 2018-09-10 RX ADMIN — FENTANYL CITRATE 50 MCG: 50 INJECTION INTRAMUSCULAR; INTRAVENOUS at 14:15

## 2018-09-10 RX ADMIN — Medication 3.5 MG: at 13:51

## 2018-09-10 RX ADMIN — FUROSEMIDE 10 MG: 10 INJECTION, SOLUTION INTRAVENOUS at 13:44

## 2018-09-10 RX ADMIN — LIDOCAINE HYDROCHLORIDE 0.5 ML: 10 INJECTION, SOLUTION EPIDURAL; INFILTRATION; INTRACAUDAL; PERINEURAL at 10:33

## 2018-09-10 RX ADMIN — KETOROLAC TROMETHAMINE 30 MG: 30 INJECTION, SOLUTION INTRAMUSCULAR at 16:51

## 2018-09-10 RX ADMIN — FENTANYL CITRATE 50 MCG: 50 INJECTION, SOLUTION INTRAMUSCULAR; INTRAVENOUS at 13:44

## 2018-09-10 RX ADMIN — CEFAZOLIN SODIUM 2 G: 2 INJECTION, SOLUTION INTRAVENOUS at 12:20

## 2018-09-10 RX ADMIN — FENTANYL CITRATE 50 MCG: 50 INJECTION INTRAMUSCULAR; INTRAVENOUS at 15:01

## 2018-09-10 RX ADMIN — FENTANYL CITRATE 50 MCG: 50 INJECTION INTRAMUSCULAR; INTRAVENOUS at 15:10

## 2018-09-10 RX ADMIN — FENTANYL CITRATE 50 MCG: 50 INJECTION INTRAMUSCULAR; INTRAVENOUS at 14:23

## 2018-09-10 RX ADMIN — ONDANSETRON 4 MG: 2 INJECTION, SOLUTION INTRAMUSCULAR; INTRAVENOUS at 14:29

## 2018-09-10 NOTE — ANESTHESIA CARE TRANSFER NOTE
Patient: Virginia Wood    Procedure(s):  Laparoscopic Right Oophorectomy & Cystoscopy & Retroperitoneal Exploration & Biopsy of Endometriosis - Wound Class: II-Clean Contaminated   - Wound Class: II-Clean Contaminated    Diagnosis: Endometriosis, right ovarian cyst  Diagnosis Additional Information: No value filed.    Anesthesia Type:   General     Note:  Airway :Nasal Cannula  Patient transferred to:PACU  Handoff Report: Identifed the Patient, Identified the Reponsible Provider, Reviewed the pertinent medical history, Discussed the surgical course, Reviewed Intra-OP anesthesia mangement and issues during anesthesia, Set expectations for post-procedure period and Allowed opportunity for questions and acknowledgement of understanding      Vitals: (Last set prior to Anesthesia Care Transfer)    CRNA VITALS  9/10/2018 1334 - 9/10/2018 1407      9/10/2018             Pulse: 105    SpO2: 100 %                Electronically Signed By: Tone Abdul CRNA, APRN CRNA  September 10, 2018  2:07 PM

## 2018-09-10 NOTE — DISCHARGE INSTRUCTIONS
Same Day Surgery Discharge Instructions  Special Precautions After Surgery - Adult    1. It is not unusual to feel lightheaded or faint, up to 24 hours after surgery or while taking pain medication.  If you have these symptoms; sit for a few minutes before standing and have someone assist you when getting up.  2. You should rest and relax for the next 24 hours and must have someone stay with you for at least 24 hours after your discharge.  3. DO NOT DRIVE any vehicle or operate mechanical equipment for 24 hours following the end of your surgery.  DO NOT DRIVE while taking narcotic pain medications that have been prescribed by your physician.  If you had a limb operated on, you must be able to use it fully to drive.  4. DO NOT drink alcoholic beverages for 24 hours following surgery or while taking prescription pain medication.  5. Drink clear liquids (apple juice, ginger ale, broth, 7-Up, etc.).  Progress to your regular diet as you feel able.  6. Any questions call your physician and do not make important decisions for 24 hours.    ACTIVITY  ? Restrictions per surgeon.     INCISIONAL CARE  ? May bathe / shower after 24 hours.  ? Apply ice 1/2 hour on and 1/2 hour off for first 48 hours.     Call for an appointment to return to the clinic in _________ weeks.    Medications:  ? Hydrocodone (Norco, Vicodin):  Next dose: _________.  ? Ibuprofen (Motrin, Advil):  Next dose: _________.  ? Stool softener to avoid constipation.  ? Estradiol (Estrace) as directed.  ? Follow the instructions on the bottle.       __________________________________________________________________________________________________________________________________  IMPORTANT NUMBERS:    [facility] Main Number:  [phone], [phone]  Pharmacy:  [phone]  Same Day Surgery:  [phone], Monday - Friday until 8:30 p.m.  Urgent Care:  [phone]  Emergency Room:  [phone]      [facility]:  [phone]                                                                                OB Clinic:  [phone]

## 2018-09-10 NOTE — H&P (VIEW-ONLY)
[facility]  [address]  [facility] [address]  [phone]  Dept: [phone]    PRE-OP EVALUATION:  Today's date: 2018    Virginia Wood (: 1985) presents for pre-operative evaluation assessment as requested by Dr. Burks.  She requires evaluation and anesthesia risk assessment prior to undergoing surgery/procedure for treatment of ovary .    Proposed Surgery/ Procedure: Laparoscopic right oophorectomy  Date of Surgery/ Procedure: 9/10/18  Time of Surgery/ Procedure: 11AM  Hospital/Surgical Facility: [facility]  Primary Physician: Ute Stearns  Type of Anesthesia Anticipated: General    Patient has a Health Care Directive or Living Will:  NO    1. NO - Do you have a history of heart attack, stroke, stent, bypass or surgery on an artery in the head, neck, heart or legs?  2. NO - Do you ever have any pain or discomfort in your chest?  3. NO - Do you have a history of  Heart Failure?  4. NO - Are you troubled by shortness of breath when: walking on the level, up a slight hill or at night?  5. NO - Do you currently have a cold, bronchitis or other respiratory infection?  6. NO - Do you have a cough, shortness of breath or wheezing?  7. NO - Do you sometimes get pains in the calves of your legs when you walk?  8. NO - Do you or anyone in your family have previous history of blood clots?  9. NO - Do you or does anyone in your family have a serious bleeding problem such as prolonged bleeding following surgeries or cuts?  10. NO - Have you ever had problems with anemia or been told to take iron pills?  11. NO - Have you had any abnormal blood loss such as black, tarry or bloody stools, or abnormal vaginal bleeding?  12. NO - Have you ever had a blood transfusion?  13. YES - Have you or any of your relatives ever had problems with anesthesia? nausea  14. NO - Do you have sleep apnea, excessive snoring or daytime drowsiness?  15. NO - Do you have any prosthetic heart  valves?  16. NO - Do you have prosthetic joints?  17. NO - Is there any chance that you may be pregnant?      HPI:     HPI related to upcoming procedure: recurrent cyst of the right ovary.  She has a h/o endometriosis and is s/p LSO and hysterectomy.  She has been intolerant of OCPs and declined Lupron therapy.        See problem list for active medical problems.  Problems all longstanding and stable, except as noted/documented.  See ROS for pertinent symptoms related to these conditions.                                                                                                                                                          .    MEDICAL HISTORY:     Patient Active Problem List    Diagnosis Date Noted     Endometriosis 05/09/2013     Priority: High     Status post left oophorectomy 07/02/2013     Priority: Medium     Status post hysterectomy 07/02/2013     Priority: Medium     UTI (urinary tract infection) 05/30/2013     Priority: Medium     CARDIOVASCULAR SCREENING; LDL GOAL LESS THAN 160 10/31/2010     Priority: Medium     Lumbar radicular pain 09/14/2010     Priority: Medium     Work comp  -SAMUEL Alvarenga/RN with [employer]  [phone]/fax [phone]       Lumbago 06/02/2005     Priority: Medium      History reviewed. No pertinent past medical history.  Past Surgical History:   Procedure Laterality Date     C ORAL SURGERY PROCEDURE  6/2007    wisdom teeth     HYSTERECTOMY, PAP NO LONGER INDICATED       LAPAROSCOPIC HYSTERECTOMY TOTAL, BILATERAL SALPINGO-OOPHORECTOMY, COMBINED  5/20/2013    Procedure: COMBINED LAPAROSCOPIC HYSTERECTOMY TOTAL, SALPINGO-OOPHORECTOMY;  Laparoscopic total hysterectomy & bilateral salpingectomy & left oopherectomy & cystoscopy;  Surgeon: Marcus Burks MD;  Location: [facility] OR     Current Outpatient Prescriptions   Medication Sig Dispense Refill     IBUPROFEN PO        Multiple Vitamins-Minerals (DAILY MULTI PO) Take  by mouth.       Multiple  "Vitamins-Minerals (IMMUNE SUPPORT PO) Immune Support       scopolamine (TRANSDERM) 72 hr patch Apply 1 patch to hairless area behind one ear at least 4 hours before travel.  Remove old patch and change every 3 days (72 hours). 1 patch 0     TYLENOL 325 MG OR TABS 1 TO 2 TABLETS BY MOUTH EVERY 4 HOURS AS NEEDED FOR PAIN, DO NOT EXCEED 4,000 MG OF ACETAMINOPHEN IN 24 HOURS       OTC products: None, except as noted above    Allergies   Allergen Reactions     Nkda [No Known Drug Allergies]       Latex Allergy: NO    Social History   Substance Use Topics     Smoking status: Former Smoker     Packs/day: 0.50     Years: 2.00     Types: Cigarettes     Quit date: 2/1/2008     Smokeless tobacco: Never Used      Comment: occasional smoker     Alcohol use Yes      Comment: minimal; quit since pregnancy     History   Drug Use No       REVIEW OF SYSTEMS:   CONSTITUTIONAL: NEGATIVE for fever, chills, change in weight  ENT/MOUTH: feels like there is a constant lump in her throat.  More noticeable some days than others.    RESP: NEGATIVE for significant cough or SOB  CV: NEGATIVE for chest pain, palpitations or peripheral edema  : see HPI      EXAM:   /56  Pulse 82  Temp 98.2  F (36.8  C) (Tympanic)  Resp 18  Ht 5' 6.25\" (1.683 m)  Wt 151 lb (68.5 kg)  LMP 04/11/2013  SpO2 99%  Breastfeeding? No  BMI 24.19 kg/m2  GENERAL APPEARANCE: healthy, alert and no distress  HENT: ear canals and TM's normal and nose and mouth without ulcers or lesions  RESP: lungs clear to auscultation - no rales, rhonchi or wheezes  CV: regular rate and rhythm, normal S1 S2, no S3 or S4 and no murmur, click or rub   ABDOMEN: soft, nontender, no HSM or masses and bowel sounds normal  NEURO: Normal strength and tone, sensory exam grossly normal, mentation intact and speech normal    DIAGNOSTICS:   No labs or EKG required for low risk surgery (cataract, skin procedure, breast biopsy, etc)    Recent Labs   Lab Test  05/15/14   1310  06/03/13   " 1030   03/28/13   1352   HGB  12.3  12.6   < >  12.7   PLT  256  408   < >  251   NA  140   --    --   139   POTASSIUM  4.6   --    --   3.6   CR  0.72   --    --   0.59    < > = values in this interval not displayed.        IMPRESSION:   Reason for surgery/procedure: right ovarian cyst and enodmetriosos  Diagnosis/reason for consult: preoperative evaluation and medical risk assessment    The proposed surgical procedure is considered LOW risk.    REVISED CARDIAC RISK INDEX  The patient has the following serious cardiovascular risks for perioperative complications such as (MI, PE, VFib and 3  AV Block):  No serious cardiac risks  INTERPRETATION: 0 risks: Class I (very low risk - 0.4% complication rate)    The patient has the following additional risks for perioperative complications:  No identified additional risks      ICD-10-CM    1. Preop general physical exam Z01.818 scopolamine (TRANSDERM) 72 hr patch   2. Ovarian cyst, right N83.201    3. Endometriosis N80.9    4. Postoperative nausea R11.0     Z98.890        RECOMMENDATIONS:         --Patient is to take all scheduled medications on the day of surgery EXCEPT for modifications listed below.    APPROVAL GIVEN to proceed with proposed procedure, without further diagnostic evaluation       Signed Electronically by: Ute Stearns MD    Copy of this evaluation report is provided to requesting physician.    [facility] Preop Guidelines    Revised Cardiac Risk Index

## 2018-09-10 NOTE — IP AVS SNAPSHOT
MRN:[id number]                      After Visit Summary   9/10/2018    Virginia Wood    MRN: [id number]           Thank you!     Thank you for choosing [facility] for your care. Our goal is always to provide you with excellent care. Hearing back from our patients is one way we can continue to improve our services. Please take a few minutes to complete the written survey that you may receive in the mail after you visit with us. Thank you!        Patient Information     Date Of Birth          1985        About your hospital stay     You were admitted on:  September 10, 2018 You last received care in the:  [facility] PreOP/Phase II    You were discharged on:  September 10, 2018       Who to Call     For medical emergencies, please call 911.  For non-urgent questions about your medical care, please call your primary care provider or clinic, [phone]  For questions related to your surgery, please call your surgery clinic        Attending Provider     Provider Specialty    Marcus Burks MD OB/Gyn       Primary Care Provider Office Phone # Fax #    Ute Stearns [phone]      After Care Instructions     Discharge Instructions       Resume pre procedure diet            Discharge Instructions       Pelvic Rest. No intercourse for  1  week.            Discharge Instructions       Patient may drive beginning POD  1            Ice to affected area       PRN as tolerated            No alcohol       NO ALCOHOL for 24 hours post procedure            No driving or operating machinery       No driving or operating machinery until day after procedure            Shower        Shower on Post-op day  1.                  Further instructions from your care team                           Same Day Surgery Discharge Instructions  Special Precautions After Surgery - Adult    1. It is not unusual to feel lightheaded or faint, up to 24 hours after surgery or while taking pain  medication.  If you have these symptoms; sit for a few minutes before standing and have someone assist you when getting up.  2. You should rest and relax for the next 24 hours and must have someone stay with you for at least 24 hours after your discharge.  3. DO NOT DRIVE any vehicle or operate mechanical equipment for 24 hours following the end of your surgery.  DO NOT DRIVE while taking narcotic pain medications that have been prescribed by your physician.  If you had a limb operated on, you must be able to use it fully to drive.  4. DO NOT drink alcoholic beverages for 24 hours following surgery or while taking prescription pain medication.  5. Drink clear liquids (apple juice, ginger ale, broth, 7-Up, etc.).  Progress to your regular diet as you feel able.  6. Any questions call your physician and do not make important decisions for 24 hours.    ACTIVITY  ? Restrictions per surgeon.     INCISIONAL CARE  ? May bathe / shower after 24 hours.  ? Apply ice 1/2 hour on and 1/2 hour off for first 48 hours.     Call for an appointment to return to the clinic in _________ weeks.    Medications:  ? Hydrocodone (Norco, Vicodin):  Next dose: _________.  ? Ibuprofen (Motrin, Advil):  Next dose: _________.  ? Stool softener to avoid constipation.  ? Estradiol (Estrace) as directed.  ? Follow the instructions on the bottle.       __________________________________________________________________________________________________________________________________  IMPORTANT NUMBERS:    [facility] Main Number:  [phone], [phone]  Pharmacy:  [phone]  Same Day Surgery:  [phone], Monday - Friday until 8:30 p.m.  Urgent Care:  [phone]  Emergency Room:  [phone]      [facility]:  [phone]                                                                               OB Clinic:  [phone]           Pending Results     Date and Time Order Name Status Description    9/10/2018 1323 Surgical pathology  "exam In process             Admission Information     Date & Time Provider Department Dept. Phone    9/10/2018 Marcus Burks MD [facility] PreOP/Phase [phone]      Your Vitals Were     Blood Pressure Pulse Temperature Respirations Height Weight    91/44 73 97.7  F (36.5  C) (Oral) 12 1.683 m (5' 6.25\") 66.2 kg (146 lb)    Last Period Pulse Oximetry BMI (Body Mass Index)             04/11/2013 98% 23.39 kg/m2         MyChart Information     [facility] gives you secure access to your electronic health record. If you see a primary care provider, you can also send messages to your care team and make appointments. If you have questions, please call your primary care clinic.  If you do not have a primary care provider, please call [phone] and they will assist you.        Care EveryWhere ID     This is your Care EveryWhere ID. This could be used by other organizations to access your [facility] medical records  [id number]        Equal Access to Services     NADIR HERNANDEZ AH: Hadii aad ku hadasho Soari, waaxda luqadaha, qaybta kaalmada adeegyajada, hong garner . So [facility] [phone].    ATENCIÓN: Si habla español, tiene a clinton disposición servicios gratuitos de asistencia lingüística. Llame al [phone].    We comply with applicable federal civil rights laws and [place] laws. We do not discriminate on the basis of race, color, national origin, age, disability, sex, sexual orientation, or gender identity.               Review of your medicines      START taking        Dose / Directions    estradiol 1 MG tablet   Commonly known as:  ESTRACE   Used for:  Surgical menopause        Take 1 tablet sublingually daily   Quantity:  30 tablet   Refills:  0       HYDROcodone-acetaminophen 5-325 MG per tablet   Commonly known as:  NORCO   Used for:  S/P laparoscopic surgery        Dose:  1-2 tablet   Take 1-2 tablets by mouth every 4 hours as needed for other (Moderate to Severe Pain) "   Quantity:  20 tablet   Refills:  0         CONTINUE these medicines which may have CHANGED, or have new prescriptions. If we are uncertain of the size of tablets/capsules you have at home, strength may be listed as something that might have changed.        Dose / Directions    * IBUPROFEN PO   This may have changed:  Another medication with the same name was added. Make sure you understand how and when to take each.        Refills:  0       * ibuprofen 200 MG tablet   Commonly known as:  ADVIL/MOTRIN   This may have changed:  You were already taking a medication with the same name, and this prescription was added. Make sure you understand how and when to take each.   Used for:  S/P laparoscopic surgery        Dose:  600 mg   Take 3 tablets (600 mg) by mouth every 6 hours as needed for pain (mild)   Refills:  0       * Notice:  This list has 2 medication(s) that are the same as other medications prescribed for you. Read the directions carefully, and ask your doctor or other care provider to review them with you.      CONTINUE these medicines which have NOT CHANGED        Dose / Directions    * DAILY MULTI PO        Take  by mouth.   Refills:  0       * IMMUNE SUPPORT PO        Immune Support   Refills:  0       scopolamine 72 hr patch   Commonly known as:  TRANSDERM   Used for:  Preop general physical exam        Apply 1 patch to hairless area behind one ear at least 4 hours before travel.  Remove old patch and change every 3 days (72 hours).   Quantity:  1 patch   Refills:  0       TYLENOL 325 MG tablet   Generic drug:  acetaminophen        1 TO 2 TABLETS BY MOUTH EVERY 4 HOURS AS NEEDED FOR PAIN, DO NOT EXCEED 4,000 MG OF ACETAMINOPHEN IN 24 HOURS   Refills:  0       * Notice:  This list has 2 medication(s) that are the same as other medications prescribed for you. Read the directions carefully, and ask your doctor or other care provider to review them with you.         Where to get your medicines      These  medications were sent to [facility] [place], [place] - [address]  [address] [address]     Phone:  [phone]     estradiol 1 MG tablet         Some of these will need a paper prescription and others can be bought over the counter. Ask your nurse if you have questions.     Bring a paper prescription for each of these medications     HYDROcodone-acetaminophen 5-325 MG per tablet       You don't need a prescription for these medications     ibuprofen 200 MG tablet                Protect others around you: Learn how to safely use, store and throw away your medicines at www.disposemymeds.org.        Information about OPIOIDS     PRESCRIPTION OPIOIDS: WHAT YOU NEED TO KNOW   We gave you an opioid (narcotic) pain medicine. It is important to manage your pain, but opioids are not always the best choice. You should first try all the other options your care team gave you. Take this medicine for as short a time (and as few doses) as possible.    Some activities can increase your pain, such as bandage changes or therapy sessions. It may help to take your pain medicine 30 to 60 minutes before these activities. Reduce your stress by getting enough sleep, working on hobbies you enjoy and practicing relaxation or meditation. Talk to your care team about ways to manage your pain beyond prescription opioids.    These medicines have risks:    DO NOT drive when on new or higher doses of pain medicine. These medicines can affect your alertness and reaction times, and you could be arrested for driving under the influence (DUI). If you need to use opioids long-term, talk to your care team about driving.    DO NOT operate heavy machinery    DO NOT do any other dangerous activities while taking these medicines.    DO NOT drink any alcohol while taking these medicines.     If the opioid prescribed includes acetaminophen, DO NOT take with any other medicines that contain acetaminophen. Read all labels  carefully. Look for the word  acetaminophen  or  Tylenol.  Ask your pharmacist if you have questions or are unsure.    You can get addicted to pain medicines, especially if you have a history of addiction (chemical, alcohol or substance dependence). Talk to your care team about ways to reduce this risk.    All opioids tend to cause constipation. Drink plenty of water and eat foods that have a lot of fiber, such as fruits, vegetables, prune juice, apple juice and high-fiber cereal. Take a laxative (Miralax, milk of magnesia, Colace, Senna) if you don t move your bowels at least every other day. Other side effects include upset stomach, sleepiness, dizziness, throwing up, tolerance (needing more of the medicine to have the same effect), physical dependence and slowed breathing.    Store your pills in a secure place, locked if possible. We will not replace any lost or stolen medicine. If you don t finish your medicine, please throw away (dispose) as directed by your pharmacist. The [place] Pollution Control Agency has more information about safe disposal: https://www.pca.[facility].[place].us/living-green/managing-unwanted-medications             Medication List: This is a list of all your medications and when to take them. Check marks below indicate your daily home schedule. Keep this list as a reference.      Medications           Morning Afternoon Evening Bedtime As Needed    * DAILY MULTI PO   Take  by mouth.                                * IMMUNE SUPPORT PO   Immune Support                                estradiol 1 MG tablet   Commonly known as:  ESTRACE   Take 1 tablet sublingually daily                                HYDROcodone-acetaminophen 5-325 MG per tablet   Commonly known as:  NORCO   Take 1-2 tablets by mouth every 4 hours as needed for other (Moderate to Severe Pain)                                * IBUPROFEN PO                                * ibuprofen 200 MG tablet   Commonly known as:  ADVIL/MOTRIN   Take  3 tablets (600 mg) by mouth every 6 hours as needed for pain (mild)                                scopolamine 72 hr patch   Commonly known as:  TRANSDERM   Apply 1 patch to hairless area behind one ear at least 4 hours before travel.  Remove old patch and change every 3 days (72 hours).                                TYLENOL 325 MG tablet   1 TO 2 TABLETS BY MOUTH EVERY 4 HOURS AS NEEDED FOR PAIN, DO NOT EXCEED 4,000 MG OF ACETAMINOPHEN IN 24 HOURS   Generic drug:  acetaminophen                                * Notice:  This list has 4 medication(s) that are the same as other medications prescribed for you. Read the directions carefully, and ask your doctor or other care provider to review them with you.

## 2018-09-10 NOTE — ANESTHESIA POSTPROCEDURE EVALUATION
Patient: Virginia Wood    Procedure(s):  Laparoscopic Right Oophorectomy & Cystoscopy & Retroperitoneal Exploration & Biopsy of Endometriosis - Wound Class: II-Clean Contaminated   - Wound Class: II-Clean Contaminated    Diagnosis:Endometriosis, right ovarian cyst  Diagnosis Additional Information: No value filed.    Anesthesia Type:  General    Note:  Anesthesia Post Evaluation    Patient participation: Able to fully participate in evaluation  Level of consciousness: awake  Pain management: adequate  Airway patency: patent  Cardiovascular status: acceptable  Respiratory status: acceptable  Hydration status: stable  PONV: controlled     Anesthetic complications: None          Last vitals:  Vitals:    09/10/18 1530 09/10/18 1545 09/10/18 1630   BP: 109/66 91/44 90/40   Pulse:      Resp: 16 12 16   Temp:      SpO2: 100% 98% 98%         Electronically Signed By: MEGAN Grajeda CRNA  September 10, 2018  5:53 PM

## 2018-09-10 NOTE — IP AVS SNAPSHOT
[facility] PreOP/Phase II    [address] [address]    Phone:  [phone]    Fax:  [phone]                                       After Visit Summary   9/10/2018    Virginia Wood    MRN: [id number]           After Visit Summary Signature Page     I have received my discharge instructions, and my questions have been answered. I have discussed any challenges I see with this plan with the nurse or doctor.    ..........................................................................................................................................  Patient/Patient Representative Signature      ..........................................................................................................................................  Patient Representative Print Name and Relationship to Patient    ..................................................               ................................................  Date                                            Time    ..........................................................................................................................................  Reviewed by Signature/Title    ...................................................              ..............................................  Date                                                            Time          22EPIC Rev 08/18

## 2018-09-10 NOTE — OP NOTE
[facility] Gynecology  Operative Note    Pre-operative diagnosis: Endometriosis, right ovarian cyst   Post-operative diagnosis: Same   Procedure: Laparoscopy  Right oophorectomy  Retroperitoneal exploration  Biopsy of pelvic peritoneum  cystoscopy   Surgeon: Marcus Burks MD   Assistant(s): Cynthia PIERSON   Anesthesia: General Endotracheal Anesthesia   Estimated blood loss: 10 ml   Total IV fluids: (See anesthesia record)  1800 ml   Blood transfusion: No transfusion was given during surgery   Total urine output: Not measured   Drains: None   Specimens: Right ovary  Pelvic peritoneum   Findings: See dictation   Complications: None   Condition: Stable   Comments: Virginia Wood   1985  [id number]      Virginia Wood  presented for the above procedure.  She has endometriosis, is s/p hysterectomy and LSO  I met with Virginia  and her ,  and discussed the planned procedure as well as the expected post operative course.  Risks of complications were noted and postoperative signs to watch for outlined.  Questions were answered and consent signed.  She was taken to the OR @ [facility] where she was placed in the supine position. She underwent General anesthesia with endotracheal intubation.  She was then placed in the Dorso-lithotomy position in [facility].  An examination under anesthesia was performed that showed: Cervix and uterus are surgically absent.  The vaginal cuff is well supported and no adnexal masses were palpable.  She was prepped and draped.  A timeout was held confirming her identity and proposed procedures. All were in agreement.      Speculum was placed in the vagina and a stick sponge placed.  The speculum was removed.  Attention was then turned to the abdomen.  Each abdominal incision was pre-instilled 0.25% Marcaine.  Umbilical incision was made and the Veress needle was placed grasping the anterior abdominal wall for countertraction.  Opening pressure of 7 mmHg.  2.2  L of CO2 were insufflated Veress was removed and a 5 mm trocar and cannula placed using the same technique was documented visually.  Findings included absence of the left adnexa uterus and cervix right ovary was enlarged, golf ball sized and adherent to the underlying ureter.  Liver edge appeared normal.  Right lower quadrant millimeter port was placed with an accessory port.  The 12 mm port was placed in the suprapubic area under direct vision.  The peristalsis of the right ureter was evident.  The proximity to the infundibulopelvic ligament.  Troublesome.    The right ligaments crossclamped cauterized and transected.  Dissection was carried out cephalad to the retroperitoneum.  The infundibulopelvic ligament was isolated from the ureter safely crossclamped cauterized and transected.  The difficulty was with the adherent peritoneal tissue and caudad direction.  This was a dissected away using sharp and blunt dissection until the peritoneum was free of the ureter.  Was then transected allowing removal of the ovary.  This was removed with an Endo Catch bag through the 12 mm port site.  I had to extend the fascial incision laterally.  The ovary was removed the site was closed with a figure of X 0 Vicryl suture using Pepe-Lida fascial closure device.  0.5% Marcaine with epinephrine was instilled in the pelvic ligaments were postoperative pain control.  0.25% Marcaine was instilled intra-abdominally.  Hemostasis was assured.  The pneumoperitoneum was reduced he reports removed and the incisions closed using 4-0 Vicryl sutures and Dermabond.  Vaginal instruments were then removed.  Sponge needle counts are correct.  Instrument counts are correct.  She is awakened and taken the PACU in good condition.    Marcus Burks

## 2018-09-11 ENCOUNTER — MYC MEDICAL ADVICE (OUTPATIENT)
Dept: OBGYN | Facility: CLINIC | Age: 33
End: 2018-09-11

## 2018-09-11 DIAGNOSIS — E89.40 SURGICAL MENOPAUSE: Primary | ICD-10-CM

## 2018-09-11 DIAGNOSIS — Z98.890 S/P LAPAROSCOPIC SURGERY: ICD-10-CM

## 2018-09-13 PROBLEM — E89.40 SURGICAL MENOPAUSE: Status: ACTIVE | Noted: 2018-09-13

## 2018-09-13 LAB — COPATH REPORT: NORMAL

## 2018-09-13 RX ORDER — HYDROCODONE BITARTRATE AND ACETAMINOPHEN 5; 325 MG/1; MG/1
1-2 TABLET ORAL EVERY 4 HOURS PRN
Qty: 20 TABLET | Refills: 0 | Status: SHIPPED | OUTPATIENT
Start: 2018-09-13 | End: 2018-10-24

## 2018-09-13 NOTE — TELEPHONE ENCOUNTER
"Return call to patient.  Spoke with patient on the phone.    S-(situation): Generalized abdominal pain,worse at night after day of activity. Patient rates pain at night 9/10. Patient reports on average pain is 7/10. Patient reports with narcotic pain medication, pain is 4/10. Patient reports incision are bruised but not draining or infected appearing. Patient denies fever, nausea or vomiting. Patient reports bowel movements since surgery. Patient reports regular but harder to go due to pain in abdomen. Patient reports stool is not hard/ patient is pushing extra fluids. Patient reports urine is light yellow and clear appearing. Patient denies urgency or frequency. Patient reports \" letdown sensation\" with urination that can be felt \"internally.\" Patient is not concerned with UTI.    B-(background): 9/10/18 Laparoscopic Right Oophorectomy & Cystoscopy & Retroperitoneal Exploration & Biopsy of Endometriosis    A-(assessment): post op pain secondary to surgical procedure on Monday 9/10/18    R-(recommendations): Reviewed comfort measures such as Ibuprofen, ice, resting and taking it easy.    Please review and advise.  Thank you.    Raquel Diaz   Ob/Gyn Clinic  RN      "

## 2018-09-13 NOTE — TELEPHONE ENCOUNTER
Reason for Call:  Other prescription    Detailed comments: Pt calling to ask if she can get more pain medication?  She had surgery on 9/17/18 and is having pain when she urinates.  Pt would like the RX brought down to the pharmacy here if approved.    Phone Number Patient can be reached at: Home number on file [phone] (home)    Best Time: today    Can we leave a detailed message on this number? YES    Call taken on 9/13/2018 at 11:14 AM by Ute Marin

## 2018-09-13 NOTE — TELEPHONE ENCOUNTER
Hand signed rx for [facility] to [facility].  Pt informed.    -Melany CEDILLO Jhoan  [facility] [place]

## 2018-09-14 NOTE — PROGRESS NOTES
Crystal   Your tissue is back and is benign,  The peritoneal biopsy confirmed endometriosis  I hope that you are feeling better  Marcus Burks

## 2018-09-20 ENCOUNTER — OFFICE VISIT (OUTPATIENT)
Dept: OBGYN | Facility: CLINIC | Age: 33
End: 2018-09-20
Payer: COMMERCIAL

## 2018-09-20 ENCOUNTER — TELEPHONE (OUTPATIENT)
Dept: OBGYN | Facility: CLINIC | Age: 33
End: 2018-09-20

## 2018-09-20 VITALS
HEART RATE: 69 BPM | BODY MASS INDEX: 23.14 KG/M2 | TEMPERATURE: 97.9 F | RESPIRATION RATE: 18 BRPM | HEIGHT: 66 IN | WEIGHT: 144 LBS | DIASTOLIC BLOOD PRESSURE: 64 MMHG | SYSTOLIC BLOOD PRESSURE: 104 MMHG

## 2018-09-20 DIAGNOSIS — S30.1XXA HEMATOMA OF ABDOMINAL WALL, INITIAL ENCOUNTER: Primary | ICD-10-CM

## 2018-09-20 PROCEDURE — 99024 POSTOP FOLLOW-UP VISIT: CPT | Performed by: OBSTETRICS & GYNECOLOGY

## 2018-09-20 NOTE — TELEPHONE ENCOUNTER
"S-(situation): \"I have a kiwi size lump at site of bottom incision and it is very sore and tender\".    B-(background): S/p  9/10/18 Laparoscopic Right Oophorectomy & Cystoscopy & Retroperitoneal Exploration & Biopsy of Endometriosis    A-(assessment): Pt report that she feels completely fine except for the lump.  No fever, area is discolored \"color of bruising\" hard and very tender to the touch.  \"It has always been there but now is very sore.  Pt tried to wear pants yesterday and couldn't due to the pain.  Pt denies any nausea or vomiting, eating and drinking with no problems.  She does report that when first initiating to pass her urine she feels strong bladder spasms.  She denies burning, frequency or urgency with urination.  Urine is clear with no blood noted.     R-(recommendations): Pt advised will have on call provider review and advise of plan.     Yary Shane  [facility] RN    "

## 2018-09-20 NOTE — TELEPHONE ENCOUNTER
Sounds like a hematoma which is a collection of blood under the skin; I can take a look at it sometime today so give her an appt   Aylin Martinez MD   [facility]

## 2018-09-20 NOTE — MR AVS SNAPSHOT
After Visit Summary   9/20/2018    Virginia Wood    MRN: [id number]           Patient Information     Date Of Birth          1985        Visit Information        Provider Department      9/20/2018 11:30 AM Aylin Martinez MD [facility]        Today's Diagnoses     Hematoma of abdominal wall, initial encounter    -  1       Follow-ups after your visit        Your next 10 appointments already scheduled     Oct 24, 2018  2:30 PM CDT   Office Visit with Marcus Burks MD   [facility] ([facility]    [address] [facility] [address] [phone]           Bring a current list of meds and any records pertaining to this visit. For Physicals, please bring immunization records and any forms needing to be filled out. Please arrive 10 minutes early to complete paperwork.              Who to contact     If you have questions or need follow up information about today's clinic visit or your schedule please contact [facility] directly at [phone].  Normal or non-critical lab and imaging results will be communicated to you by Whitepageshart, letter or phone within 4 business days after the clinic has received the results. If you do not hear from us within 7 days, please contact the clinic through Qubulust or phone. If you have a critical or abnormal lab result, we will notify you by phone as soon as possible.  Submit refill requests through Kahua or call your pharmacy and they will forward the refill request to us. Please allow 3 business days for your refill to be completed.          Additional Information About Your Visit        MyChart Information     Kahua gives you secure access to your electronic health record. If you see a primary care provider, you can also send messages to your care team and make appointments. If you have questions, please call your primary care clinic.  If you do not have a primary care  "provider, please call [phone] and they will assist you.        Care EveryWhere ID     This is your Care EveryWhere ID. This could be used by other organizations to access your [place] medical records  [id number]        Your Vitals Were     Pulse Temperature Respirations Height Last Period BMI (Body Mass Index)    69 97.9  F (36.6  C) (Tympanic) 18 5' 6\" (1.676 m) 04/11/2013 23.24 kg/m2       Blood Pressure from Last 3 Encounters:   09/20/18 104/64   09/10/18 112/72   09/05/18 114/56    Weight from Last 3 Encounters:   09/20/18 144 lb (65.3 kg)   09/10/18 146 lb (66.2 kg)   09/05/18 151 lb (68.5 kg)              Today, you had the following     No orders found for display       Primary Care Provider Office Phone # Fax #    Ute Stearns [phone] [address] [facility] [address]        Equal Access to Services     [facility] : Hadii mari ku hadasho Soomaali, waaxda luqadaha, qaybta kaalmada adeegyada, hong garner . So [facility] [phone].    ATENCIÓN: Si habla español, tiene a clinton disposición servicios gratuitos de asistencia lingüística. Llame al [phone].    We comply with applicable federal civil rights laws and [place] laws. We do not discriminate on the basis of race, color, national origin, age, disability, sex, sexual orientation, or gender identity.            Thank you!     Thank you for choosing [facility]  for your care. Our goal is always to provide you with excellent care. Hearing back from our patients is one way we can continue to improve our services. Please take a few minutes to complete the written survey that you may receive in the mail after your visit with us. Thank you!             Your Updated Medication List - Protect others around you: Learn how to safely use, store and throw away your medicines at www.disposemymeds.org.          This list is accurate as of 9/20/18 11:49 AM.  Always use your most recent " med list.                   Brand Name Dispense Instructions for use Diagnosis    * DAILY MULTI PO      Take  by mouth.        * IMMUNE SUPPORT PO      Immune Support        estradiol 1 MG tablet    ESTRACE    30 tablet    Take 1 tablet sublingually daily    Surgical menopause       HYDROcodone-acetaminophen 5-325 MG per tablet    NORCO    20 tablet    Take 1-2 tablets by mouth every 4 hours as needed for other (Moderate to Severe Pain)    S/P laparoscopic surgery       * IBUPROFEN PO           * ibuprofen 200 MG tablet    ADVIL/MOTRIN     Take 3 tablets (600 mg) by mouth every 6 hours as needed for pain (mild)    S/P laparoscopic surgery       scopolamine 72 hr patch    TRANSDERM    1 patch    Apply 1 patch to hairless area behind one ear at least 4 hours before travel.  Remove old patch and change every 3 days (72 hours).    Preop general physical exam       TYLENOL 325 MG tablet   Generic drug:  acetaminophen      1 TO 2 TABLETS BY MOUTH EVERY 4 HOURS AS NEEDED FOR PAIN, DO NOT EXCEED 4,000 MG OF ACETAMINOPHEN IN 24 HOURS        * Notice:  This list has 4 medication(s) that are the same as other medications prescribed for you. Read the directions carefully, and ask your doctor or other care provider to review them with you.

## 2018-09-20 NOTE — TELEPHONE ENCOUNTER
Reason for Call:  Other call back    Detailed comments: pt stating she has surgery last Monday 9/10, all her incisions look fine. Under the bottom incision there is a kiwi sized lump that is hard and tender. Not sure what it is.     Phone Number Patient can be reached at: Home number on file [phone] (home)    Best Time: any     Can we leave a detailed message on this number? YES    Call taken on 9/20/2018 at 8:14 AM by [place]

## 2018-09-20 NOTE — PROGRESS NOTES
"Virginia is a 33 year old female 10 days S/P laparoscopic salpingo-oopherectomy, complicated by swelling around suprapubic incision; this appeared early after procedure with overlying eccymosis, but it has not significiantly gone down in size and is ;she has no nausea or emesis, no fever, no skin erythema.  She is currently requiring Ibuprofen for pain management.  The pathology report showed n/a..    Exam: /64 (BP Location: Right arm, Patient Position: Chair, Cuff Size: Adult Small)  Pulse 69  Temp 97.9  F (36.6  C) (Tympanic)  Resp 18  Ht 5' 6\" (1.676 m)  Wt 144 lb (65.3 kg)  LMP 04/11/2013  BMI 23.24 kg/m2   incisions: all are intact; resolving eccymosis noted around suprapubic incision; ovoid non fluctuant fullness superior to incision; no significant change in size with Valsalva or when stands up; minimally tender to palpation; no drainage or erthema; no bowel palpable; no interruption in fascia appreciable    Assessment:  Postop  Incisional hematoma    Plan:recommend giving this further time to resolve and f/u with Dr. Burks; if signs of infection or hernia appear, then proceed to ER for evaluation  Aylin Martinez MD  [facility]      "

## 2018-09-20 NOTE — TELEPHONE ENCOUNTER
Pt notified of below.  Pt reports understanding.  Pt does not have further questions or concerns.  Patient scheduled for 1130 today.    Raquel Diaz   [facility]  RN

## 2018-10-07 ENCOUNTER — MYC REFILL (OUTPATIENT)
Dept: OBGYN | Facility: CLINIC | Age: 33
End: 2018-10-07

## 2018-10-07 DIAGNOSIS — E89.40 SURGICAL MENOPAUSE: ICD-10-CM

## 2018-10-08 RX ORDER — ESTRADIOL 1 MG/1
TABLET ORAL
Qty: 30 TABLET | Refills: 0 | Status: SHIPPED | OUTPATIENT
Start: 2018-10-08 | End: 2018-10-24

## 2018-10-08 NOTE — TELEPHONE ENCOUNTER
Rx request for Estrace.  Last authorized on 09-10-18 for a qty of 30 with 0 refills.  Pt has future appt on 10-24-18.  Will authorize 1 refill at this time.    Yary Shane  [facility] RN

## 2018-10-24 ENCOUNTER — OFFICE VISIT (OUTPATIENT)
Dept: OBGYN | Facility: CLINIC | Age: 33
End: 2018-10-24
Payer: COMMERCIAL

## 2018-10-24 VITALS
DIASTOLIC BLOOD PRESSURE: 66 MMHG | HEART RATE: 99 BPM | BODY MASS INDEX: 22.69 KG/M2 | WEIGHT: 141.2 LBS | RESPIRATION RATE: 18 BRPM | SYSTOLIC BLOOD PRESSURE: 102 MMHG | TEMPERATURE: 98.5 F | HEIGHT: 66 IN

## 2018-10-24 DIAGNOSIS — E89.40 SURGICAL MENOPAUSE: ICD-10-CM

## 2018-10-24 DIAGNOSIS — N83.201 RIGHT OVARIAN CYST: Primary | ICD-10-CM

## 2018-10-24 DIAGNOSIS — N80.9 ENDOMETRIOSIS: ICD-10-CM

## 2018-10-24 PROCEDURE — 99212 OFFICE O/P EST SF 10 MIN: CPT | Performed by: OBSTETRICS & GYNECOLOGY

## 2018-10-24 RX ORDER — ESTRADIOL 1 MG/1
1 TABLET ORAL DAILY
Qty: 90 TABLET | Refills: 3 | Status: SHIPPED | OUTPATIENT
Start: 2018-10-24 | End: 2019-10-06

## 2018-10-24 NOTE — PROGRESS NOTES
"CARLOS Wood is a 33 year old female presents for post operative check. She is  6  week(s) status post Laparoscopic right  oopherectomy.  She reports doing well and denies significant pain or bleeding. 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  Blood pressure 102/66, pulse 99, temperature 98.5  F (36.9  C), resp. rate 18, height 5' 6\" (1.676 m), weight 141 lb 3.2 oz (64 kg), last menstrual period 04/11/2013, not currently breastfeeding.    Abd: soft, non-tender, non-distended. Incision clear, dry, and intact without evidence of infection.    A. /P.  (N83.201) Right ovarian cyst  (primary encounter diagnosis)  Comment: s/p R oophorectomy  Plan: return to normal activity    (N80.9) Endometriosis  Comment: peritoneal      (E89.40) Surgical menopause  Comment: doing well on ERT  Plan: estradiol (ESTRACE) 1 MG tablet        1 year supply         Follow up prn or when due for next annual exam.    Marcus Burks    "

## 2018-10-24 NOTE — MR AVS SNAPSHOT
After Visit Summary   10/24/2018    Virginia Wood    MRN: [id number]           Patient Information     Date Of Birth          1985        Visit Information        Provider Department      10/24/2018 2:30 PM Marcus Burks MD [facility]        Today's Diagnoses     Right ovarian cyst    -  1    Endometriosis        Surgical menopause           Follow-ups after your visit        Follow-up notes from your care team     Return if symptoms worsen or fail to improve.      Your next 10 appointments already scheduled     Oct 29, 2018 10:40 AM CDT   [facility] MEGAN [facility] C[facility]   [facility]    [address]  [facility] [address] [phone]              Who to contact     If you have questions or need follow up information about today's clinic visit or your schedule please contact [facility] directly at [phone].  Normal or non-critical lab and imaging results will be communicated to you by MyChart, letter or phone within 4 business days after the clinic has received the results. If you do not hear from us within 7 days, please contact the clinic through Booodlhart or phone. If you have a critical or abnormal lab result, we will notify you by phone as soon as possible.  Submit refill requests through [employer] or call your pharmacy and they will forward the refill request to us. Please allow 3 business days for your refill to be completed.          Additional Information About Your Visit        MyChart Information     [employer] gives you secure access to your electronic health record. If you see a primary care provider, you can also send messages to your care team and make appointments. If you have questions, please call your primary care clinic.  If you do not have a primary care provider, please call [phone] and they will assist you.        Care EveryWhere ID     This is your Care  "EveryWhere ID. This could be used by other organizations to access your [place] medical records  [id number]        Your Vitals Were     Pulse Temperature Respirations Height Last Period BMI (Body Mass Index)    99 98.5  F (36.9  C) 18 5' 6\" (1.676 m) 04/11/2013 22.79 kg/m2       Blood Pressure from Last 3 Encounters:   10/24/18 102/66   09/20/18 104/64   09/10/18 112/72    Weight from Last 3 Encounters:   10/24/18 141 lb 3.2 oz (64 kg)   09/20/18 144 lb (65.3 kg)   09/10/18 146 lb (66.2 kg)              Today, you had the following     No orders found for display         Today's Medication Changes          These changes are accurate as of 10/24/18  3:07 PM.  If you have any questions, ask your nurse or doctor.               These medicines have changed or have updated prescriptions.        Dose/Directions    estradiol 1 MG tablet   Commonly known as:  ESTRACE   This may have changed:    - how much to take  - how to take this  - when to take this   Used for:  Surgical menopause   Changed by:  Marcus Burks MD        Dose:  1 mg   Take 1 tablet (1 mg) by mouth daily Take 1 tablet sublingually daily   Quantity:  90 tablet   Refills:  3       ibuprofen 200 MG tablet   Commonly known as:  ADVIL/MOTRIN   This may have changed:  Another medication with the same name was removed. Continue taking this medication, and follow the directions you see here.   Used for:  S/P laparoscopic surgery   Changed by:  Marcus Burks MD        Dose:  600 mg   Take 3 tablets (600 mg) by mouth every 6 hours as needed for pain (mild)   Refills:  0         Stop taking these medicines if you haven't already. Please contact your care team if you have questions.     HYDROcodone-acetaminophen 5-325 MG per tablet   Commonly known as:  NORCO   Stopped by:  Marcus Burks MD           scopolamine 72 hr patch   Commonly known as:  TRANSDERM   Stopped by:  Marcus Burks MD                Where to get your medicines    "   These medications were sent to [facility] [place] - [address] [facility]  [address] [facility] [place], [facility] [address]     Phone:  [phone]     estradiol 1 MG tablet                Primary Care Provider Office Phone # Fax #    Ute Stearns [phone] [address]  [facility] [address]        Equal Access to Services     NADIR HERNANDEZ : Hadii aad ku hadasho Soomaali, waaxda luqadaha, qaybta kaalmada adeegyada, waxay idiin hayaan adeeg kharash la'janay . So [facility] [phone].    ATENCIÓN: Si habla español, tiene a clinton disposición servicios gratuitos de asistencia lingüística. [facility] [phone].    We comply with applicable federal civil rights laws and [place] laws. We do not discriminate on the basis of race, color, national origin, age, disability, sex, sexual orientation, or gender identity.            Thank you!     Thank you for choosing [facility]  for your care. Our goal is always to provide you with excellent care. Hearing back from our patients is one way we can continue to improve our services. Please take a few minutes to complete the written survey that you may receive in the mail after your visit with us. Thank you!             Your Updated Medication List - Protect others around you: Learn how to safely use, store and throw away your medicines at www.disposemymeds.org.          This list is accurate as of 10/24/18  3:07 PM.  Always use your most recent med list.                   Brand Name Dispense Instructions for use Diagnosis    * DAILY MULTI PO      Take  by mouth.        * IMMUNE SUPPORT PO      Immune Support        estradiol 1 MG tablet    ESTRACE    90 tablet    Take 1 tablet (1 mg) by mouth daily Take 1 tablet sublingually daily    Surgical menopause       ibuprofen 200 MG tablet    ADVIL/MOTRIN     Take 3 tablets (600 mg) by mouth every 6 hours as needed for pain (mild)    S/P laparoscopic surgery        TYLENOL 325 MG tablet   Generic drug:  acetaminophen      1 TO 2 TABLETS BY MOUTH EVERY 4 HOURS AS NEEDED FOR PAIN, DO NOT EXCEED 4,000 MG OF ACETAMINOPHEN IN 24 HOURS        * Notice:  This list has 2 medication(s) that are the same as other medications prescribed for you. Read the directions carefully, and ask your doctor or other care provider to review them with you.

## 2018-10-26 ASSESSMENT — ENCOUNTER SYMPTOMS
NERVOUS/ANXIOUS: 0
FREQUENCY: 0
FEVER: 0
CONSTIPATION: 0
COUGH: 0
WEAKNESS: 0
ARTHRALGIAS: 0
CHILLS: 0
SORE THROAT: 0
MYALGIAS: 0
HEARTBURN: 0
SHORTNESS OF BREATH: 0
JOINT SWELLING: 0
DIARRHEA: 0
ABDOMINAL PAIN: 0
HEMATURIA: 0
EYE PAIN: 0
PARESTHESIAS: 0
NAUSEA: 0
HEADACHES: 0
HEMATOCHEZIA: 0
BREAST MASS: 0
DYSURIA: 0
PALPITATIONS: 0
DIZZINESS: 0

## 2018-10-29 ENCOUNTER — OFFICE VISIT (OUTPATIENT)
Dept: FAMILY MEDICINE | Facility: CLINIC | Age: 33
End: 2018-10-29
Payer: COMMERCIAL

## 2018-10-29 VITALS
OXYGEN SATURATION: 100 % | BODY MASS INDEX: 22.82 KG/M2 | TEMPERATURE: 98 F | WEIGHT: 142 LBS | DIASTOLIC BLOOD PRESSURE: 60 MMHG | HEIGHT: 66 IN | SYSTOLIC BLOOD PRESSURE: 98 MMHG | HEART RATE: 63 BPM | RESPIRATION RATE: 16 BRPM

## 2018-10-29 DIAGNOSIS — Z13.1 SCREENING FOR DIABETES MELLITUS: ICD-10-CM

## 2018-10-29 DIAGNOSIS — Z00.00 ROUTINE GENERAL MEDICAL EXAMINATION AT A HEALTH CARE FACILITY: Primary | ICD-10-CM

## 2018-10-29 DIAGNOSIS — Z13.220 SCREENING FOR LIPOID DISORDERS: ICD-10-CM

## 2018-10-29 LAB
CHOLEST SERPL-MCNC: 181 MG/DL
GLUCOSE SERPL-MCNC: 86 MG/DL (ref 70–99)
HDLC SERPL-MCNC: 80 MG/DL
LDLC SERPL CALC-MCNC: 91 MG/DL
NONHDLC SERPL-MCNC: 101 MG/DL
TRIGL SERPL-MCNC: 49 MG/DL

## 2018-10-29 PROCEDURE — 82947 ASSAY GLUCOSE BLOOD QUANT: CPT | Performed by: NURSE PRACTITIONER

## 2018-10-29 PROCEDURE — 80061 LIPID PANEL: CPT | Performed by: NURSE PRACTITIONER

## 2018-10-29 PROCEDURE — 36415 COLL VENOUS BLD VENIPUNCTURE: CPT | Performed by: NURSE PRACTITIONER

## 2018-10-29 PROCEDURE — 99395 PREV VISIT EST AGE 18-39: CPT | Performed by: NURSE PRACTITIONER

## 2018-10-29 NOTE — PROGRESS NOTES
Answers for HPI/ROS submitted by the patient on 10/26/2018   Annual Exam:  Getting at least 3 servings of Calcium per day:: Yes  Bi-annual eye exam:: Yes  Dental care twice a year:: Yes  Sleep apnea or symptoms of sleep apnea:: None  Diet:: Carbohydrate counting  Frequency of exercise:: 2-3 days/week  abdominal pain: No  Blood in stool: No  Blood in urine: No  chest pain: No  chills: No  congestion: No  constipation: No  cough: No  diarrhea: No  dizziness: No  ear pain: No  eye pain: No  nervous/anxious: No  fever: No  frequency: No  genital sores: No  headaches: No  hearing loss: No  heartburn: No  arthralgias: No  joint swelling: No  peripheral edema: No  mood changes: No  myalgias: No  nausea: No  dysuria: No  palpitations: No  Skin sensation changes: No  sore throat: No  urgency: No  rash: No  shortness of breath: No  visual disturbance: No  weakness: No  pelvic pain: No  vaginal bleeding: No  vaginal discharge: No  tenderness: No  breast mass: No  breast discharge: No  Taking medications regularly:: Yes  Medication side effects:: Not applicable  Additional concerns today:: No  PHQ-2 Score: 0  Duration of exercise:: 15-30 minutes

## 2018-10-29 NOTE — MR AVS SNAPSHOT
After Visit Summary   10/29/2018    Virginia Wood    MRN: [id number]           Patient Information     Date Of Birth          1985        Visit Information        Provider Department      10/29/2018 10:40 AM Cathy Newman APRN [facility]        Today's Diagnoses     Routine general medical examination at a health care facility    -  1    Screening for lipoid disorders        Screening for diabetes mellitus          Care Instructions      Preventive Health Recommendations  Female Ages 26 - 39  Yearly exam:   See your health care provider every year in order to    Review health changes.     Discuss preventive care.      Review your medicines if you your doctor has prescribed any.    Until age 30: Get a Pap test every three years (more often if you have had an abnormal result).    After age 30: Talk to your doctor about whether you should have a Pap test every 3 years or have a Pap test with HPV screening every 5 years.   You do not need a Pap test if your uterus was removed (hysterectomy) and you have not had cancer.  You should be tested each year for STDs (sexually transmitted diseases), if you're at risk.   Talk to your provider about how often to have your cholesterol checked.  If you are at risk for diabetes, you should have a diabetes test (fasting glucose).  Shots: Get a flu shot each year. Get a tetanus shot every 10 years.   Nutrition:     Eat at least 5 servings of fruits and vegetables each day.    Eat whole-grain bread, whole-wheat pasta and brown rice instead of white grains and rice.    Get adequate Calcium and Vitamin D.     Lifestyle    Exercise at least 150 minutes a week (30 minutes a day, 5 days of the week). This will help you control your weight and prevent disease.    Limit alcohol to one drink per day.    No smoking.     Wear sunscreen to prevent skin cancer.    See your dentist every six months for an exam and cleaning.             "Follow-ups after your visit        Follow-up notes from your care team     Return in about 1 year (around 10/29/2019) for Routine Visit, Lab Work, or sooner if symptoms persist or worsen.      Who to contact     If you have questions or need follow up information about today's clinic visit or your schedule please contact [facility] directly at [phone].  Normal or non-critical lab and imaging results will be communicated to you by Vision Chain Inchart, letter or phone within 4 business days after the clinic has received the results. If you do not hear from us within 7 days, please contact the clinic through [employer]'s All naturalt or phone. If you have a critical or abnormal lab result, we will notify you by phone as soon as possible.  Submit refill requests through [employer] or call your pharmacy and they will forward the refill request to us. Please allow 3 business days for your refill to be completed.          Additional Information About Your Visit        Vision Chain Inchart Information     [employer] gives you secure access to your electronic health record. If you see a primary care provider, you can also send messages to your care team and make appointments. If you have questions, please call your primary care clinic.  If you do not have a primary care provider, please call [phone] and they will assist you.        Care EveryWhere ID     This is your Care EveryWhere ID. This could be used by other organizations to access your [place] medical records  [id number]        Your Vitals Were     Pulse Temperature Respirations Height Last Period Pulse Oximetry    63 98  F (36.7  C) (Oral) 16 5' 6\" (1.676 m) 04/11/2013 100%    BMI (Body Mass Index)                   22.92 kg/m2            Blood Pressure from Last 3 Encounters:   10/29/18 98/60   10/24/18 102/66   09/20/18 104/64    Weight from Last 3 Encounters:   10/29/18 142 lb (64.4 kg)   10/24/18 141 lb 3.2 oz (64 kg)   09/20/18 144 lb (65.3 kg)              We Performed the " Following     Glucose     Lipid Profile (Chol, Trig, HDL, LDL calc)        Primary Care Provider Office Phone # Fax #    Ute Stearns [phone] [address]  [facility] [address]        Equal Access to Services     MARCNOE DAVID : Margaux mari england terello Soomaali, waaxda luqadaha, qaybta kaalmada adeegyada, hong rodriguezn audrey lovell lavaleryedmond cisse. So [facility] [phone].    ATENCIÓN: Si habla español, tiene a clinton disposición servicios gratuitos de asistencia lingüística. Llame al [phone].    We comply with applicable federal civil rights laws and [place] laws. We do not discriminate on the basis of race, color, national origin, age, disability, sex, sexual orientation, or gender identity.            Thank you!     Thank you for choosing [facility]  for your care. Our goal is always to provide you with excellent care. Hearing back from our patients is one way we can continue to improve our services. Please take a few minutes to complete the written survey that you may receive in the mail after your visit with us. Thank you!             Your Updated Medication List - Protect others around you: Learn how to safely use, store and throw away your medicines at www.disposemymeds.org.          This list is accurate as of 10/29/18 11:03 AM.  Always use your most recent med list.                   Brand Name Dispense Instructions for use Diagnosis    * DAILY MULTI PO      Take  by mouth.        * IMMUNE SUPPORT PO      Immune Support        estradiol 1 MG tablet    ESTRACE    90 tablet    Take 1 tablet (1 mg) by mouth daily Take 1 tablet sublingually daily    Surgical menopause       ibuprofen 200 MG tablet    ADVIL/MOTRIN     Take 3 tablets (600 mg) by mouth every 6 hours as needed for pain (mild)    S/P laparoscopic surgery       TYLENOL 325 MG tablet   Generic drug:  acetaminophen      1 TO 2 TABLETS BY MOUTH EVERY 4 HOURS AS NEEDED FOR PAIN, DO NOT EXCEED 4,000 MG OF  ACETAMINOPHEN IN 24 HOURS        * Notice:  This list has 2 medication(s) that are the same as other medications prescribed for you. Read the directions carefully, and ask your doctor or other care provider to review them with you.

## 2018-10-29 NOTE — PROGRESS NOTES
SUBJECTIVE:   CC: Virginia Wood is an 33 year old woman who presents for preventive health visit.     Physical   Annual:     Getting at least 3 servings of Calcium per day:  Yes    Bi-annual eye exam:  Yes    Dental care twice a year:  Yes    Sleep apnea or symptoms of sleep apnea:  None    Diet:  Carbohydrate counting    Frequency of exercise:  2-3 days/week    Duration of exercise:  15-30 minutes    Taking medications regularly:  Yes    Medication side effects:  Not applicable    Additional concerns today:  No        Form to be filled out. Biometric form for her 's employer.      Today's PHQ-2 Score:   PHQ-2 ( 1999 [employer]) 10/26/2018   Q1: Little interest or pleasure in doing things 0   Q2: Feeling down, depressed or hopeless 0   PHQ-2 Score 0   Q1: Little interest or pleasure in doing things Not at all   Q2: Feeling down, depressed or hopeless Not at all   PHQ-2 Score 0       Abuse: Current or Past(Physical, Sexual or Emotional)- No  Do you feel safe in your environment - Yes    Social History   Substance Use Topics     Smoking status: Former Smoker     Packs/day: 0.50     Years: 2.00     Types: Cigarettes     Quit date: 2/1/2008     Smokeless tobacco: Never Used      Comment: occasional smoker     Alcohol use Yes      Comment: minimal; quit since pregnancy     Alcohol Use 10/26/2018   If you drink alcohol do you typically have greater than 3 drinks per day OR greater than 7 drinks per week? No       Reviewed orders with patient.  Reviewed health maintenance and updated orders accordingly - Yes  BP Readings from Last 3 Encounters:   10/29/18 98/60   10/24/18 102/66   09/20/18 104/64    Wt Readings from Last 3 Encounters:   10/29/18 142 lb (64.4 kg)   10/24/18 141 lb 3.2 oz (64 kg)   09/20/18 144 lb (65.3 kg)                  Patient Active Problem List   Diagnosis     Lumbago     Lumbar radicular pain     CARDIOVASCULAR SCREENING; LDL GOAL LESS THAN 160     Endometriosis     UTI (urinary tract  infection)     Status post left oophorectomy     Status post hysterectomy     Surgical menopause     Past Surgical History:   Procedure Laterality Date     C ORAL SURGERY PROCEDURE  6/2007    wisdom teeth     CYSTOSCOPY N/A 9/10/2018    Procedure: CYSTOSCOPY;;  Surgeon: Marcus Burks MD;  Location: [facility]     HYSTERECTOMY, PAP NO LONGER INDICATED       LAPAROSCOPIC HYSTERECTOMY TOTAL, BILATERAL SALPINGO-OOPHORECTOMY, COMBINED  5/20/2013    Procedure: COMBINED LAPAROSCOPIC HYSTERECTOMY TOTAL, SALPINGO-OOPHORECTOMY;  Laparoscopic total hysterectomy & bilateral salpingectomy & left oopherectomy & cystoscopy;  Surgeon: Marcus Burks MD;  Location: [facility]     LAPAROSCOPIC OOPHORECTOMY Right 9/10/2018    Procedure: LAPAROSCOPIC OOPHORECTOMY;  Laparoscopic Right Oophorectomy & Cystoscopy & Retroperitoneal Exploration & Biopsy of Endometriosis;  Surgeon: Marcus Burks MD;  Location: [facility]       Social History   Substance Use Topics     Smoking status: Former Smoker     Packs/day: 0.50     Years: 2.00     Types: Cigarettes     Quit date: 2/1/2008     Smokeless tobacco: Never Used      Comment: occasional smoker     Alcohol use Yes      Comment: minimal; quit since pregnancy     Family History   Problem Relation Age of Onset     Lipids Maternal Grandmother      HEART DISEASE Maternal Grandmother      Eye Disorder Maternal Grandmother      macular degeneration     Diabetes Maternal Grandmother      Hypertension Maternal Grandmother      Depression Mother      Alcohol/Drug Maternal Grandfather      alcohol     Depression Maternal Grandfather      Colon Cancer Maternal Grandfather      Unknown/Adopted Father      Substance Abuse Brother      Asthma No family hx of      Breast Cancer No family hx of      Cancer - colorectal No family hx of      Prostate Cancer No family hx of      Cerebrovascular Disease No family hx of          Current Outpatient Prescriptions   Medication Sig Dispense Refill     estradiol  (ESTRACE) 1 MG tablet Take 1 tablet (1 mg) by mouth daily Take 1 tablet sublingually daily 90 tablet 3     ibuprofen (ADVIL/MOTRIN) 200 MG tablet Take 3 tablets (600 mg) by mouth every 6 hours as needed for pain (mild)       Multiple Vitamins-Minerals (DAILY MULTI PO) Take  by mouth.       Multiple Vitamins-Minerals (IMMUNE SUPPORT PO) Immune Support       TYLENOL 325 MG OR TABS 1 TO 2 TABLETS BY MOUTH EVERY 4 HOURS AS NEEDED FOR PAIN, DO NOT EXCEED 4,000 MG OF ACETAMINOPHEN IN 24 HOURS       Allergies   Allergen Reactions     Nkda [No Known Drug Allergies]        Mammogram not appropriate for this patient based on age.    Pertinent mammograms are reviewed under the imaging tab.  History of abnormal Pap smear: NO - age 30- 65 PAP every 3 years recommended  PAP / HPV 4/23/2013 12/23/2008 4/4/2008   PAP NIL NIL NIL     Reviewed and updated as needed this visit by clinical staff         Reviewed and updated as needed this visit by Provider        Past Medical History:   Diagnosis Date     PONV (postoperative nausea and vomiting)       Past Surgical History:   Procedure Laterality Date     C ORAL SURGERY PROCEDURE  6/2007    wisdom teeth     CYSTOSCOPY N/A 9/10/2018    Procedure: CYSTOSCOPY;;  Surgeon: Marcus Burks MD;  Location: [facility]     HYSTERECTOMY, PAP NO LONGER INDICATED       LAPAROSCOPIC HYSTERECTOMY TOTAL, BILATERAL SALPINGO-OOPHORECTOMY, COMBINED  5/20/2013    Procedure: COMBINED LAPAROSCOPIC HYSTERECTOMY TOTAL, SALPINGO-OOPHORECTOMY;  Laparoscopic total hysterectomy & bilateral salpingectomy & left oopherectomy & cystoscopy;  Surgeon: Marcus Burks MD;  Location: [facility]     LAPAROSCOPIC OOPHORECTOMY Right 9/10/2018    Procedure: LAPAROSCOPIC OOPHORECTOMY;  Laparoscopic Right Oophorectomy & Cystoscopy & Retroperitoneal Exploration & Biopsy of Endometriosis;  Surgeon: Marcus Burks MD;  Location: [facility]       Review of Systems  CONSTITUTIONAL: NEGATIVE for fever, chills, change in  weight  INTEGUMENTARY/SKIN: NEGATIVE for worrisome rashes, moles or lesions  EYES: NEGATIVE for vision changes or irritation  ENT: NEGATIVE for ear, mouth and throat problems  RESP: NEGATIVE for significant cough or SOB  BREAST: NEGATIVE for masses, tenderness or discharge  CV: NEGATIVE for chest pain, palpitations or peripheral edema  GI: NEGATIVE for nausea, abdominal pain, heartburn, or change in bowel habits  : NEGATIVE for unusual urinary or vaginal symptoms. No vaginal bleeding.  MUSCULOSKELETAL: NEGATIVE for significant arthralgias or myalgia  NEURO: NEGATIVE for weakness, dizziness or paresthesias  PSYCHIATRIC: NEGATIVE for changes in mood or affect      OBJECTIVE:   LMP 04/11/2013  Physical Exam  GENERAL: healthy, alert and no distress  EYES: Eyes grossly normal to inspection, PERRL and conjunctivae and sclerae normal  HENT: ear canals and TM's normal, nose and mouth without ulcers or lesions  NECK: no adenopathy, no asymmetry, masses, or scars and thyroid normal to palpation  RESP: lungs clear to auscultation - no rales, rhonchi or wheezes  BREAST: normal without masses, tenderness or nipple discharge and no palpable axillary masses or adenopathy  CV: regular rate and rhythm, normal S1 S2, no S3 or S4, no murmur, click or rub, no peripheral edema and peripheral pulses strong  ABDOMEN: soft, nontender, no hepatosplenomegaly, no masses and bowel sounds normal  MS: no gross musculoskeletal defects noted, no edema  SKIN: no suspicious lesions or rashes  NEURO: Normal strength and tone, mentation intact and speech normal  PSYCH: mentation appears normal, affect normal/bright    Diagnostic Test Results:  No results found for this or any previous visit (from the past 24 hour(s)).    ASSESSMENT/PLAN:   1. Routine general medical examination at a health care facility    Refusing flu shot.    2. Screening for lipoid disorders    - Lipid Profile (Chol, Trig, HDL, LDL calc)    3. Screening for diabetes mellitus    -  "Glucose    COUNSELING:  Reviewed preventive health counseling, as reflected in patient instructions       Regular exercise       Healthy diet/nutrition       Vision screening       Hearing screening    BP Readings from Last 1 Encounters:   10/24/18 102/66     Estimated body mass index is 22.79 kg/(m^2) as calculated from the following:    Height as of 10/24/18: 5' 6\" (1.676 m).    Weight as of 10/24/18: 141 lb 3.2 oz (64 kg).           reports that she quit smoking about 10 years ago. Her smoking use included Cigarettes. She has a 1.00 pack-year smoking history. She has never used smokeless tobacco.      Counseling Resources:  ATP IV Guidelines  Pooled Cohorts Equation Calculator  Breast Cancer Risk Calculator  FRAX Risk Assessment  ICSI Preventive Guidelines  Dietary Guidelines for Americans, 2010  USDA's MyPlate  ASA Prophylaxis  Lung CA Screening    MEGAN Stanley [facility]  Answers for HPI/ROS submitted by the patient on 10/26/2018   PHQ-2 Score: 0    "

## 2019-02-15 ENCOUNTER — OFFICE VISIT (OUTPATIENT)
Dept: FAMILY MEDICINE | Facility: CLINIC | Age: 34
End: 2019-02-15
Payer: COMMERCIAL

## 2019-02-15 VITALS
OXYGEN SATURATION: 99 % | HEIGHT: 67 IN | HEART RATE: 60 BPM | RESPIRATION RATE: 16 BRPM | DIASTOLIC BLOOD PRESSURE: 70 MMHG | BODY MASS INDEX: 22.29 KG/M2 | WEIGHT: 142 LBS | SYSTOLIC BLOOD PRESSURE: 110 MMHG | TEMPERATURE: 98.5 F

## 2019-02-15 DIAGNOSIS — B35.4 TINEA CORPORIS: Primary | ICD-10-CM

## 2019-02-15 DIAGNOSIS — T75.3XXA MOTION SICKNESS, INITIAL ENCOUNTER: ICD-10-CM

## 2019-02-15 DIAGNOSIS — T75.3XXA SEA SICKNESS, INITIAL ENCOUNTER: ICD-10-CM

## 2019-02-15 PROCEDURE — 99213 OFFICE O/P EST LOW 20 MIN: CPT | Performed by: NURSE PRACTITIONER

## 2019-02-15 RX ORDER — CLOTRIMAZOLE 1 %
CREAM (GRAM) TOPICAL 2 TIMES DAILY
Qty: 28 G | Refills: 0 | Status: SHIPPED | OUTPATIENT
Start: 2019-02-15 | End: 2019-10-29

## 2019-02-15 RX ORDER — SCOLOPAMINE TRANSDERMAL SYSTEM 1 MG/1
PATCH, EXTENDED RELEASE TRANSDERMAL
Qty: 4 PATCH | Refills: 1 | Status: SHIPPED | OUTPATIENT
Start: 2019-02-15 | End: 2021-02-05

## 2019-02-15 RX ORDER — ONDANSETRON 4 MG/1
4 TABLET, FILM COATED ORAL EVERY 8 HOURS PRN
Qty: 10 TABLET | Refills: 3 | Status: SHIPPED | OUTPATIENT
Start: 2019-02-15 | End: 2021-02-05

## 2019-02-15 ASSESSMENT — MIFFLIN-ST. JEOR: SCORE: 1373.8

## 2019-02-15 NOTE — PROGRESS NOTES
SUBJECTIVE:   Virginia Wood is a 33 year old female who presents to clinic today for the following health issues:      Rash      Duration: 2 months maybe    Description  Location: left bicep, now groin area.  Itching: moderate    Intensity:  moderate    Accompanying signs and symptoms: None    History (similar episodes/previous evaluation): had a similar rash 1 year ago, it went away on its own.    Precipitating or alleviating factors:  New exposures:  None  Recent travel: no      Therapies tried and outcome: hydrocortisone cream -  not effective, Benadryl/diphenhydramine -  not effective and antifungal cream made it worse.          Problem list and histories reviewed & adjusted, as indicated.  Additional history: states she's had this rash on upper left arm for past 2 months and it won't go away. It itches only occasionally, no pain.  She's tried several OTC remedies and nothing has worked. Going on a cruise in 3 weeks so hopes it to go away.  She would also like refills of her nausea medications she takes when traveling for motion sickness.      Patient Active Problem List   Diagnosis     Lumbago     Lumbar radicular pain     CARDIOVASCULAR SCREENING; LDL GOAL LESS THAN 160     Endometriosis     UTI (urinary tract infection)     Status post left oophorectomy     Status post hysterectomy     Surgical menopause     Past Surgical History:   Procedure Laterality Date     C ORAL SURGERY PROCEDURE  6/2007    wisdom teeth     CYSTOSCOPY N/A 9/10/2018    Procedure: CYSTOSCOPY;;  Surgeon: Marcus Burks MD;  Location: [facility]     HYSTERECTOMY, PAP NO LONGER INDICATED       LAPAROSCOPIC HYSTERECTOMY TOTAL, BILATERAL SALPINGO-OOPHORECTOMY, COMBINED  5/20/2013    Procedure: COMBINED LAPAROSCOPIC HYSTERECTOMY TOTAL, SALPINGO-OOPHORECTOMY;  Laparoscopic total hysterectomy & bilateral salpingectomy & left oopherectomy & cystoscopy;  Surgeon: Marcus Burks MD;  Location: [facility]     LAPAROSCOPIC OOPHORECTOMY  Right 9/10/2018    Procedure: LAPAROSCOPIC OOPHORECTOMY;  Laparoscopic Right Oophorectomy & Cystoscopy & Retroperitoneal Exploration & Biopsy of Endometriosis;  Surgeon: Marcus Burks MD;  Location: [facility]       Social History     Tobacco Use     Smoking status: Former Smoker     Packs/day: 0.50     Years: 2.00     Pack years: 1.00     Types: Cigarettes     Last attempt to quit: 2008     Years since quittin.0     Smokeless tobacco: Never Used     Tobacco comment: occasional smoker   Substance Use Topics     Alcohol use: Yes     Comment: minimal; quit since pregnancy     Family History   Problem Relation Age of Onset     Lipids Maternal Grandmother      Heart Disease Maternal Grandmother      Eye Disorder Maternal Grandmother         macular degeneration     Diabetes Maternal Grandmother      Hypertension Maternal Grandmother      Depression Mother      Alcohol/Drug Maternal Grandfather         alcohol     Depression Maternal Grandfather      Colon Cancer Maternal Grandfather      Unknown/Adopted Father      Substance Abuse Brother      Asthma No family hx of      Breast Cancer No family hx of      Cancer - colorectal No family hx of      Prostate Cancer No family hx of      Cerebrovascular Disease No family hx of          Current Outpatient Medications   Medication Sig Dispense Refill     clotrimazole (LOTRIMIN) 1 % external cream Apply topically 2 times daily 28 g 0     estradiol (ESTRACE) 1 MG tablet Take 1 tablet (1 mg) by mouth daily Take 1 tablet sublingually daily 90 tablet 3     ibuprofen (ADVIL/MOTRIN) 200 MG tablet Take 3 tablets (600 mg) by mouth every 6 hours as needed for pain (mild)       Multiple Vitamins-Minerals (DAILY MULTI PO) Take  by mouth.       Multiple Vitamins-Minerals (IMMUNE SUPPORT PO) Immune Support       ondansetron (ZOFRAN) 4 MG tablet Take 1 tablet (4 mg) by mouth every 8 hours as needed for nausea 10 tablet 3     scopolamine (TRANSDERM) 1 MG/3DAYS 72 hr patch Apply 1  "patch to hairless area behind one ear at least 4 hours before travel.  Remove old patch and change every 3 days (72 hours). 4 patch 1     TYLENOL 325 MG OR TABS 1 TO 2 TABLETS BY MOUTH EVERY 4 HOURS AS NEEDED FOR PAIN, DO NOT EXCEED 4,000 MG OF ACETAMINOPHEN IN 24 HOURS       Allergies   Allergen Reactions     Nkda [No Known Drug Allergies]      BP Readings from Last 3 Encounters:   02/15/19 110/70   10/29/18 98/60   10/24/18 102/66    Wt Readings from Last 3 Encounters:   02/15/19 64.4 kg (142 lb)   10/29/18 64.4 kg (142 lb)   10/24/18 64 kg (141 lb 3.2 oz)                    Reviewed and updated as needed this visit by clinical staff  Tobacco  Allergies  Meds  Med Hx  Surg Hx  Fam Hx  Soc Hx      Reviewed and updated as needed this visit by Provider          ROS: 10 point ROS neg other than the symptoms noted above in the HPI.    OBJECTIVE:     /70 (BP Location: Right arm, Patient Position: Chair, Cuff Size: Adult Regular)   Pulse 60   Temp 98.5  F (36.9  C) (Oral)   Resp 16   Ht 1.689 m (5' 6.5\")   Wt 64.4 kg (142 lb)   LMP 04/11/2013   SpO2 99%   BMI 22.58 kg/m    Body mass index is 22.58 kg/m .  GENERAL: healthy, alert and no distress  NECK: no asymmetry  RESP: lungs clear to auscultation - no rales, rhonchi or wheezes  CV: regular rate and rhythm  MS: no gross musculoskeletal defects noted  SKIN: round rash upper left inner arm with raised borders and central clearance, c/w tinea    Diagnostic Test Results:  No results found for this or any previous visit (from the past 24 hour(s)).    ASSESSMENT/PLAN:             1. Tinea corporis    - clotrimazole (LOTRIMIN) 1 % external cream; Apply topically 2 times daily  Dispense: 28 g; Refill: 0  Discussed how to take the medication(s), expected outcomes, potential side effects.    2. Sea sickness, initial encounter    - scopolamine (TRANSDERM) 1 MG/3DAYS 72 hr patch; Apply 1 patch to hairless area behind one ear at least 4 hours before travel.  " Remove old patch and change every 3 days (72 hours).  Dispense: 4 patch; Refill: 1    3. Motion sickness, initial encounter    - ondansetron (ZOFRAN) 4 MG tablet; Take 1 tablet (4 mg) by mouth every 8 hours as needed for nausea  Dispense: 10 tablet; Refill: 3    See Patient Instructions  Patient Instructions   Try the Tinea as prescribed.  If no improvement by next week, will send to dermatology.  Refills of nausea meds sent.      Our Clinic hours are:  Mondays    7:20 am - 7 pm  Tues -  Fri  7:20 am - 5 pm    Clinic Phone: [phone]    The clinic lab opens at 7:30 am Mon - Fri and appointments are required.    [facility]  Ph. [phone]  Monday  8 am - 7pm  Tues - Fri 8 am - 5:30 pm             MEGAN Stanley CNP  [facility]

## 2019-02-15 NOTE — PATIENT INSTRUCTIONS
Try the Tinea as prescribed.  If no improvement by next week, will send to dermatology.  Refills of nausea meds sent.      Our Clinic hours are:  Mondays    7:20 am - 7 pm  Tues -  Fri  7:20 am - 5 pm    Clinic Phone: [phone]    The clinic lab opens at 7:30 am Mon - Fri and appointments are required.    [facility]. [phone]  Monday  8 am - 7pm  Tues - Fri 8 am - 5:30 pm

## 2019-02-21 ENCOUNTER — MYC MEDICAL ADVICE (OUTPATIENT)
Dept: FAMILY MEDICINE | Facility: CLINIC | Age: 34
End: 2019-02-21

## 2019-02-21 DIAGNOSIS — B35.4 TINEA CORPORIS: Primary | ICD-10-CM

## 2019-02-21 RX ORDER — TERBINAFINE HYDROCHLORIDE 250 MG/1
250 TABLET ORAL DAILY
Qty: 42 TABLET | Refills: 0 | Status: SHIPPED | OUTPATIENT
Start: 2019-02-21 | End: 2019-04-04

## 2019-02-21 NOTE — TELEPHONE ENCOUNTER
I have sent RX for oral medication once daily and may continue with topical.  I also sent referral to dermatology and hopefully they could see her before vacation if this doesn't work.  CAROLINE Still

## 2019-08-07 ENCOUNTER — VIRTUAL VISIT (OUTPATIENT)
Dept: FAMILY MEDICINE | Facility: OTHER | Age: 34
End: 2019-08-07

## 2019-08-07 NOTE — PROGRESS NOTES
"Date:   Clinician: Sera Chamberlain  Clinician NPI: [id number]  Patient: Virginia Wood  Patient : 1985  Patient Address: [address] [place], [place] [address]  Patient Phone: [phone]  Visit Protocol: Low back pain  Patient Summary:  Virginia is a 33 year old ( : 1985 ) female who initiated a Visit for evaluation of Low Back Pain. When asked the question \"Please sign me up to receive news, health information and promotions from [employer].\", Virginia responded \"No\".    Her back pain began suddenly 1-2 weeks ago. The pain is present on both sides and is located in the buttocks and low back area.   The pain varies depending on the activity. The pain decreases when she bends forward.   She is able to walk on her toes and is able to walk on her heels with her toes lifted off the ground.   Virginia is experiencing back muscle spasms.    Symptom Details   Pain: The pain is moderate (4-6 on a 10 point pain scale).    Virginia denies shooting pain, urinary frequency, vomiting, urinary retention, leg weakness, feeling feverish, loss of bladder control, a rash in the same area as the back pain, abdominal pain, loss of bowel control, saddle anesthesia, numbness or tingling in the legs, dysuria, and hematuria.   Precipitating events  The back pain did not begin in response to an injury that happened at her work. Her back pain began after sudden bending or lifting.   Pertinent medical history  Virginia has had similar episodes of back pain in the past. She has not had back surgery, kidney stones, unintended weight loss in the last three months, and cancer.   She has not seen a provider to treat this episode of low back pain.   Treatments  Virginia tried using therapeutic remedies (such as cold pack, heat, chiropractic treatment, physical therapy) and over-the-counter medications to manage her low back pain.   Additional details about medications tried as reported by the patient (free " text): Ibuprofen 200mg 2-3 tabs q 4-6hr. In combination and/or alternating with acetaminophen 500mg 2 tabs q 4-6hr depending on pain severity.   Other approaches used to manage the back pain as reported by the patient (free text): Alternating Ice &amp; Heat several times a day. BioFreeze with minimal relief when not able to ice/heat.   Virginia has not tried using prescription medications to manage her back pain.    She denies pregnancy and denies breastfeeding. She does not menstruate.   Virginia does not smoke or use smokeless tobacco.     MEDICATIONS: estradiol oral, ALLERGIES: NKDA  Clinician Response:  Dear Virginia,  Based on the information you provided, you likely have Mechanical Low Back Pain.   Low back pain that is caused by placing abnormal stress and muscle or soft tissue strain (also known as mechanical low back pain) is the most common form of back pain. The majority of cases are not related with a specific disease or abnormal structure of the spine and do not require diagnostic imaging (such as an X-ray, MRI or CAT scan). Heating, cooling, back exercises and stretches should reduce your back pain within 3-4 weeks. During this time, remain active.   Medication information  I am prescribing:     Methocarbamol (Robaxin-750) 750 mg oral tablet. Take 2 tablets by mouth every 6 hours for 2 days, then 1 tablet every 6 hours for 5 days. There are no refills with this prescription.   Self care  The following tips will help prevent and reduce back pain:     Apply heating pads on the sore area for a short duration (10 minutes per hour and as needed). A hot bath or a heating pad on your lower back may also help reduce pain and stiffness.    Maintaining a good posture reduces stress on the back muscles. To help reduce the stress on your back:    Stand and sit up straight.    Try not to slouch when standing or sitting.    Try not to stay in the same position for a long time.    Switch positions every 20 to 30 minutes  if possible.    When lifting heavy objects, squat down and use your legs rather than bending at the waist.          Stress can make your back pain worse. For this reason, take time to relax and try some relaxation techniques when you feel stressed.    Click the following link for more information: Prevent back pain.     When to seek care  Please make an appointment to be seen in a clinic or urgent care if any of the following occur:     Pain that doesn't seem to be getting better after 3 to 4 weeks    You develop new symptoms or your symptoms becomes worse    A painful rash that appears as a stripe along one side of the body    Unexplained fever    Unbearable pain    Unrelenting night pain     Seek immediate medical care if you have problems with bowel or bladder control, worsening weakness or numbness in your legs, or numbness in the inner thighs, groin, or buttocks.   Diagnosis: Mechanical low back pain  Diagnosis ICD: M54.5  Prescription: methocarbamol (Robaxin-750) 750 mg oral tablet 36 tablet, 7 days supply. Take 2 tablets by mouth every 6 hours for 2 days, then 1 tablet every 6 hours for 5 days. Refills: 0, Refill as needed: no, Allow substitutions: yes  Pharmacy: [facility] - [phone] - [address] [place] [address]

## 2019-10-06 ENCOUNTER — MYC REFILL (OUTPATIENT)
Dept: OBGYN | Facility: CLINIC | Age: 34
End: 2019-10-06

## 2019-10-06 DIAGNOSIS — E89.40 SURGICAL MENOPAUSE: ICD-10-CM

## 2019-10-07 RX ORDER — ESTRADIOL 1 MG/1
1 TABLET ORAL DAILY
Qty: 90 TABLET | Refills: 0 | Status: SHIPPED | OUTPATIENT
Start: 2019-10-07 | End: 2019-10-29

## 2019-10-07 NOTE — TELEPHONE ENCOUNTER
Rx refill request for Estrace.  Date last issued on 10-24-18 for a qty of 90 with 3 refills.  Last ov was on 10-24-18.    Called pt and she reports that she had scheduled a wellness exam with a NP, she was advised that it has not been a full year yet and insurance would not cover until a full year has lapsed.  Pt plans on scheduling this in the next week or two.    1 marybeth refill given today.    Yary Shane  [facility] RN

## 2019-10-29 ENCOUNTER — OFFICE VISIT (OUTPATIENT)
Dept: FAMILY MEDICINE | Facility: CLINIC | Age: 34
End: 2019-10-29
Payer: COMMERCIAL

## 2019-10-29 VITALS
OXYGEN SATURATION: 98 % | WEIGHT: 152 LBS | DIASTOLIC BLOOD PRESSURE: 64 MMHG | HEART RATE: 60 BPM | SYSTOLIC BLOOD PRESSURE: 98 MMHG | TEMPERATURE: 97.9 F | RESPIRATION RATE: 16 BRPM | BODY MASS INDEX: 23.86 KG/M2 | HEIGHT: 67 IN

## 2019-10-29 DIAGNOSIS — Z13.220 SCREENING FOR LIPOID DISORDERS: ICD-10-CM

## 2019-10-29 DIAGNOSIS — E89.40 SURGICAL MENOPAUSE: ICD-10-CM

## 2019-10-29 DIAGNOSIS — Z00.00 ROUTINE GENERAL MEDICAL EXAMINATION AT A HEALTH CARE FACILITY: Primary | ICD-10-CM

## 2019-10-29 DIAGNOSIS — Z12.31 ENCOUNTER FOR SCREENING MAMMOGRAM FOR BREAST CANCER: ICD-10-CM

## 2019-10-29 DIAGNOSIS — Z13.1 SCREENING FOR DIABETES MELLITUS: ICD-10-CM

## 2019-10-29 LAB
CHOLEST SERPL-MCNC: 174 MG/DL
GLUCOSE SERPL-MCNC: 81 MG/DL (ref 70–99)
HDLC SERPL-MCNC: 99 MG/DL
LDLC SERPL CALC-MCNC: 64 MG/DL
NONHDLC SERPL-MCNC: 75 MG/DL
TRIGL SERPL-MCNC: 56 MG/DL

## 2019-10-29 PROCEDURE — 36415 COLL VENOUS BLD VENIPUNCTURE: CPT | Performed by: NURSE PRACTITIONER

## 2019-10-29 PROCEDURE — 82947 ASSAY GLUCOSE BLOOD QUANT: CPT | Performed by: NURSE PRACTITIONER

## 2019-10-29 PROCEDURE — 80061 LIPID PANEL: CPT | Performed by: NURSE PRACTITIONER

## 2019-10-29 PROCEDURE — 99395 PREV VISIT EST AGE 18-39: CPT | Performed by: NURSE PRACTITIONER

## 2019-10-29 PROCEDURE — 99213 OFFICE O/P EST LOW 20 MIN: CPT | Mod: 25 | Performed by: NURSE PRACTITIONER

## 2019-10-29 RX ORDER — ESTRADIOL 1 MG/1
1 TABLET ORAL DAILY
Qty: 90 TABLET | Refills: 1 | Status: SHIPPED | OUTPATIENT
Start: 2019-10-29 | End: 2020-04-09

## 2019-10-29 ASSESSMENT — MIFFLIN-ST. JEOR: SCORE: 1414.16

## 2019-10-29 NOTE — PROGRESS NOTES
SUBJECTIVE:   CC: Virginia Wood is an 34 year old woman who presents for preventive health visit.     Healthy Habits:    Do you get at least three servings of calcium containing foods daily (dairy, green leafy vegetables, etc.)? yes    Amount of exercise or daily activities, outside of work: 3-5 day(s) per week    Problems taking medications regularly No    Medication side effects: No    Have you had an eye exam in the past two years? yes    Do you see a dentist twice per year? yes    Do you have sleep apnea, excessive snoring or daytime drowsiness?no      Refill hormones. She is s/p total hysterectomy for benign reasons. She is on estrogen replacement for past 18 months, works well. Has not had a mammogram as of yet.    Today's PHQ-2 Score:   PHQ-2 (  [employer]) 10/26/2018 2018   Q1: Little interest or pleasure in doing things 0 0   Q2: Feeling down, depressed or hopeless 0 0   PHQ-2 Score 0 0   Q1: Little interest or pleasure in doing things Not at all -   Q2: Feeling down, depressed or hopeless Not at all -   PHQ-2 Score 0 -       Abuse: Current or Past(Physical, Sexual or Emotional)- No  Do you feel safe in your environment? Yes    Social History     Tobacco Use     Smoking status: Former Smoker     Packs/day: 0.50     Years: 2.00     Pack years: 1.00     Types: Cigarettes     Last attempt to quit: 2008     Years since quittin.7     Smokeless tobacco: Never Used     Tobacco comment: occasional smoker   Substance Use Topics     Alcohol use: Yes     Comment: minimal; quit since pregnancy     If you drink alcohol do you typically have >3 drinks per day or >7 drinks per week? No                     Reviewed orders with patient.  Reviewed health maintenance and updated orders accordingly - Yes  BP Readings from Last 3 Encounters:   10/29/19 98/64   02/15/19 110/70   10/29/18 98/60    Wt Readings from Last 3 Encounters:   10/29/19 68.9 kg (152 lb)   02/15/19 64.4 kg (142 lb)   10/29/18 64.4 kg  ----- Message from Santiago Parrish sent at 3/7/2018  4:20 PM CST -----  Contact: [phone]/self   Pt states she's returning your call.  Please call and advise    (142 lb)                  Patient Active Problem List   Diagnosis     Lumbago     Lumbar radicular pain     CARDIOVASCULAR SCREENING; LDL GOAL LESS THAN 160     Endometriosis     UTI (urinary tract infection)     Status post left oophorectomy     Status post hysterectomy     Surgical menopause     Past Surgical History:   Procedure Laterality Date     C ORAL SURGERY PROCEDURE  2007    wisdom teeth     CYSTOSCOPY N/A 9/10/2018    Procedure: CYSTOSCOPY;;  Surgeon: Marcus Burks MD;  Location: [facility]     HYSTERECTOMY, PAP NO LONGER INDICATED       LAPAROSCOPIC HYSTERECTOMY TOTAL, BILATERAL SALPINGO-OOPHORECTOMY, COMBINED  2013    Procedure: COMBINED LAPAROSCOPIC HYSTERECTOMY TOTAL, SALPINGO-OOPHORECTOMY;  Laparoscopic total hysterectomy & bilateral salpingectomy & left oopherectomy & cystoscopy;  Surgeon: Marcus Burks MD;  Location: [facility]     LAPAROSCOPIC OOPHORECTOMY Right 9/10/2018    Procedure: LAPAROSCOPIC OOPHORECTOMY;  Laparoscopic Right Oophorectomy & Cystoscopy & Retroperitoneal Exploration & Biopsy of Endometriosis;  Surgeon: Marcus Burks MD;  Location: [facility]       Social History     Tobacco Use     Smoking status: Former Smoker     Packs/day: 0.50     Years: 2.00     Pack years: 1.00     Types: Cigarettes     Last attempt to quit: 2008     Years since quittin.7     Smokeless tobacco: Never Used     Tobacco comment: occasional smoker   Substance Use Topics     Alcohol use: Yes     Comment: minimal; quit since pregnancy     Family History   Problem Relation Age of Onset     Lipids Maternal Grandmother      Heart Disease Maternal Grandmother      Eye Disorder Maternal Grandmother         macular degeneration     Diabetes Maternal Grandmother      Hypertension Maternal Grandmother      Depression Mother      Alcohol/Drug Maternal Grandfather         alcohol     Depression Maternal Grandfather      Colon Cancer Maternal Grandfather      Unknown/Adopted Father      Substance  Abuse Brother      Asthma No family hx of      Breast Cancer No family hx of      Cancer - colorectal No family hx of      Prostate Cancer No family hx of      Cerebrovascular Disease No family hx of          Current Outpatient Medications   Medication Sig Dispense Refill     estradiol (ESTRACE) 1 MG tablet Take 1 tablet (1 mg) by mouth daily Take 1 tablet sublingually daily 90 tablet 1     ibuprofen (ADVIL/MOTRIN) 200 MG tablet Take 3 tablets (600 mg) by mouth every 6 hours as needed for pain (mild)       Multiple Vitamins-Minerals (DAILY MULTI PO) Take  by mouth.       Multiple Vitamins-Minerals (IMMUNE SUPPORT PO) Immune Support       ondansetron (ZOFRAN) 4 MG tablet Take 1 tablet (4 mg) by mouth every 8 hours as needed for nausea 10 tablet 3     scopolamine (TRANSDERM) 1 MG/3DAYS 72 hr patch Apply 1 patch to hairless area behind one ear at least 4 hours before travel.  Remove old patch and change every 3 days (72 hours). 4 patch 1     TYLENOL 325 MG OR TABS 1 TO 2 TABLETS BY MOUTH EVERY 4 HOURS AS NEEDED FOR PAIN, DO NOT EXCEED 4,000 MG OF ACETAMINOPHEN IN 24 HOURS       Allergies   Allergen Reactions     Nkda [No Known Drug Allergies]            Pertinent mammograms are reviewed under the imaging tab.  History of abnormal Pap smear: Status post benign hysterectomy. Health Maintenance and Surgical History updated.  PAP / HPV 4/23/2013 12/23/2008 4/4/2008   PAP NIL NIL NIL     Reviewed and updated as needed this visit by clinical staff  Tobacco  Allergies  Meds  Med Hx  Surg Hx  Fam Hx  Soc Hx        Reviewed and updated as needed this visit by Provider        Past Medical History:   Diagnosis Date     Hormone replacement therapy     post hysterectomy     PONV (postoperative nausea and vomiting)       Past Surgical History:   Procedure Laterality Date     C ORAL SURGERY PROCEDURE  6/2007    wisdom teeth     CYSTOSCOPY N/A 9/10/2018    Procedure: CYSTOSCOPY;;  Surgeon: Marcus Burks MD;  Location: [facility]  "OR     HYSTERECTOMY, PAP NO LONGER INDICATED       LAPAROSCOPIC HYSTERECTOMY TOTAL, BILATERAL SALPINGO-OOPHORECTOMY, COMBINED  5/20/2013    Procedure: COMBINED LAPAROSCOPIC HYSTERECTOMY TOTAL, SALPINGO-OOPHORECTOMY;  Laparoscopic total hysterectomy & bilateral salpingectomy & left oopherectomy & cystoscopy;  Surgeon: Marcus Burks MD;  Location: [facility]     LAPAROSCOPIC OOPHORECTOMY Right 9/10/2018    Procedure: LAPAROSCOPIC OOPHORECTOMY;  Laparoscopic Right Oophorectomy & Cystoscopy & Retroperitoneal Exploration & Biopsy of Endometriosis;  Surgeon: Marcus Burks MD;  Location: [facility]       ROS:  CONSTITUTIONAL: NEGATIVE for fever, chills, change in weight  INTEGUMENTARY/SKIN: NEGATIVE for worrisome rashes, moles or lesions  EYES: NEGATIVE for vision changes or irritation  ENT: NEGATIVE for ear, mouth and throat problems  RESP: NEGATIVE for significant cough or SOB  BREAST: NEGATIVE for masses, tenderness or discharge  CV: NEGATIVE for chest pain, palpitations or peripheral edema  GI: NEGATIVE for nausea, abdominal pain, heartburn, or change in bowel habits  : NEGATIVE for unusual urinary or vaginal symptoms. No vaginal bleeding.  MUSCULOSKELETAL: NEGATIVE for significant arthralgias or myalgia  NEURO: NEGATIVE for weakness, dizziness or paresthesias  PSYCHIATRIC: NEGATIVE for changes in mood or affect     OBJECTIVE:   BP 98/64 (BP Location: Right arm, Patient Position: Chair, Cuff Size: Adult Regular)   Pulse 60   Temp 97.9  F (36.6  C) (Oral)   Resp 16   Ht 1.689 m (5' 6.5\")   Wt 68.9 kg (152 lb)   LMP 04/11/2013   SpO2 98%   BMI 24.17 kg/m    EXAM:  GENERAL APPEARANCE: healthy, alert and no distress  EYES: Eyes grossly normal to inspection, PERRL and conjunctivae and sclerae normal  HENT: ear canals and TM's normal, nose and mouth without ulcers or lesions, oropharynx clear and oral mucous membranes moist  NECK: no adenopathy, no asymmetry, masses, or scars and thyroid normal to " "palpation  RESP: lungs clear to auscultation - no rales, rhonchi or wheezes  CV: regular rate and rhythm, normal S1 S2, no S3 or S4, no murmur, click or rub, no peripheral edema and peripheral pulses strong  ABDOMEN: soft, nontender, no hepatosplenomegaly, no masses and bowel sounds normal  MS: no musculoskeletal defects are noted and gait is age appropriate without ataxia  SKIN: no suspicious lesions or rashes  NEURO: Normal strength and tone, sensory exam grossly normal, mentation intact and speech normal  PSYCH: mentation appears normal and affect normal/bright    Diagnostic Test Results:  Labs reviewed in Epic  No results found for this or any previous visit (from the past 24 hour(s)).    ASSESSMENT/PLAN:   1. Routine general medical examination at a health care facility    Doing well, will be notified of pending labs and will have copy of biometrics for work at  to .    2. Screening for lipoid disorders    - Lipid Profile (Chol, Trig, HDL, LDL calc)    3. Screening for diabetes mellitus    - Glucose  - *MA Screening Digital Bilateral; Future    4. Surgical menopause    - estradiol (ESTRACE) 1 MG tablet; Take 1 tablet (1 mg) by mouth daily Take 1 tablet sublingually daily  Dispense: 90 tablet; Refill: 1  Refilled and mammogram scheduled.    5. Encounter for screening mammogram for breast cancer        COUNSELING:   Reviewed preventive health counseling, as reflected in patient instructions       Regular exercise       Healthy diet/nutrition    Estimated body mass index is 24.17 kg/m  as calculated from the following:    Height as of this encounter: 1.689 m (5' 6.5\").    Weight as of this encounter: 68.9 kg (152 lb).         reports that she quit smoking about 11 years ago. Her smoking use included cigarettes. She has a 1.00 pack-year smoking history. She has never used smokeless tobacco.      Counseling Resources:  ATP IV Guidelines  Pooled Cohorts Equation Calculator  Breast Cancer Risk " Calculator  FRAX Risk Assessment  ICSI Preventive Guidelines  Dietary Guidelines for Americans, 2010  USDA's MyPlate  ASA Prophylaxis  Lung CA Screening    MEGAN Stanley CNP  [facility]

## 2019-11-03 ENCOUNTER — HEALTH MAINTENANCE LETTER (OUTPATIENT)
Age: 34
End: 2019-11-03

## 2019-11-11 ENCOUNTER — ANCILLARY PROCEDURE (OUTPATIENT)
Dept: MAMMOGRAPHY | Facility: CLINIC | Age: 34
End: 2019-11-11
Attending: NURSE PRACTITIONER
Payer: COMMERCIAL

## 2019-11-11 DIAGNOSIS — Z13.1 SCREENING FOR DIABETES MELLITUS: ICD-10-CM

## 2019-11-11 PROCEDURE — 77063 BREAST TOMOSYNTHESIS BI: CPT | Mod: TC

## 2019-11-11 PROCEDURE — 77067 SCR MAMMO BI INCL CAD: CPT | Mod: TC

## 2020-04-09 ENCOUNTER — MYC REFILL (OUTPATIENT)
Dept: FAMILY MEDICINE | Facility: CLINIC | Age: 35
End: 2020-04-09

## 2020-04-09 DIAGNOSIS — E89.40 SURGICAL MENOPAUSE: ICD-10-CM

## 2020-04-09 RX ORDER — ESTRADIOL 1 MG/1
1 TABLET ORAL DAILY
Qty: 90 TABLET | Refills: 1 | Status: SHIPPED | OUTPATIENT
Start: 2020-04-09 | End: 2020-10-13

## 2020-04-09 NOTE — TELEPHONE ENCOUNTER
"Requested Prescriptions   Pending Prescriptions Disp Refills     estradiol (ESTRACE) 1 MG tablet 90 tablet 1     Sig: Take 1 tablet (1 mg) by mouth daily Take 1 tablet sublingually daily       Hormone Replacement Therapy Passed - 4/9/2020  3:14 PM        Passed - Blood pressure under 140/90 in past 12 months     BP Readings from Last 3 Encounters:   10/29/19 98/64   02/15/19 110/70   10/29/18 98/60                 Passed - Recent (12 mo) or future (30 days) visit within the authorizing provider's specialty     Patient has had an office visit with the authorizing provider or a provider within the authorizing providers department within the previous 12 mos or has a future within next 30 days. See \"Patient Info\" tab in inbasket, or \"Choose Columns\" in Meds & Orders section of the refill encounter.              Passed - Medication is active on med list        Passed - Patient is 18 years of age or older        Passed - No active pregnancy on record        Passed - No positive pregnancy test on record in past 12 months           Prescription approved per [facility] Refill Protocol.  Viki Rodriguez RN    "

## 2020-05-12 ENCOUNTER — E-VISIT (OUTPATIENT)
Dept: FAMILY MEDICINE | Facility: CLINIC | Age: 35
End: 2020-05-12
Payer: COMMERCIAL

## 2020-05-12 DIAGNOSIS — F43.21 SITUATIONAL DEPRESSION: Primary | ICD-10-CM

## 2020-05-12 PROCEDURE — 99207 ZZC NON-BILLABLE SERV PER CHARTING: CPT | Performed by: NURSE PRACTITIONER

## 2020-05-12 ASSESSMENT — ANXIETY QUESTIONNAIRES
6. BECOMING EASILY ANNOYED OR IRRITABLE: NEARLY EVERY DAY
5. BEING SO RESTLESS THAT IT IS HARD TO SIT STILL: NOT AT ALL
GAD7 TOTAL SCORE: 8
1. FEELING NERVOUS, ANXIOUS, OR ON EDGE: MORE THAN HALF THE DAYS
2. NOT BEING ABLE TO STOP OR CONTROL WORRYING: SEVERAL DAYS
GAD7 TOTAL SCORE: 8
7. FEELING AFRAID AS IF SOMETHING AWFUL MIGHT HAPPEN: NOT AT ALL
3. WORRYING TOO MUCH ABOUT DIFFERENT THINGS: SEVERAL DAYS
7. FEELING AFRAID AS IF SOMETHING AWFUL MIGHT HAPPEN: NOT AT ALL
GAD7 TOTAL SCORE: 8
4. TROUBLE RELAXING: SEVERAL DAYS

## 2020-05-12 ASSESSMENT — PATIENT HEALTH QUESTIONNAIRE - PHQ9
10. IF YOU CHECKED OFF ANY PROBLEMS, HOW DIFFICULT HAVE THESE PROBLEMS MADE IT FOR YOU TO DO YOUR WORK, TAKE CARE OF THINGS AT HOME, OR GET ALONG WITH OTHER PEOPLE: SOMEWHAT DIFFICULT
SUM OF ALL RESPONSES TO PHQ QUESTIONS 1-9: 9
SUM OF ALL RESPONSES TO PHQ QUESTIONS 1-9: 9

## 2020-05-13 ASSESSMENT — PATIENT HEALTH QUESTIONNAIRE - PHQ9: SUM OF ALL RESPONSES TO PHQ QUESTIONS 1-9: 9

## 2020-05-13 ASSESSMENT — ANXIETY QUESTIONNAIRES: GAD7 TOTAL SCORE: 8

## 2020-10-13 ENCOUNTER — OFFICE VISIT (OUTPATIENT)
Dept: FAMILY MEDICINE | Facility: CLINIC | Age: 35
End: 2020-10-13
Payer: COMMERCIAL

## 2020-10-13 VITALS
DIASTOLIC BLOOD PRESSURE: 60 MMHG | BODY MASS INDEX: 25.58 KG/M2 | WEIGHT: 163 LBS | TEMPERATURE: 97.8 F | HEIGHT: 67 IN | HEART RATE: 68 BPM | SYSTOLIC BLOOD PRESSURE: 90 MMHG | OXYGEN SATURATION: 97 % | RESPIRATION RATE: 16 BRPM

## 2020-10-13 DIAGNOSIS — E89.40 SURGICAL MENOPAUSE: ICD-10-CM

## 2020-10-13 DIAGNOSIS — Z13.220 SCREENING FOR LIPOID DISORDERS: ICD-10-CM

## 2020-10-13 DIAGNOSIS — Z13.1 SCREENING FOR DIABETES MELLITUS: ICD-10-CM

## 2020-10-13 DIAGNOSIS — L29.9 PRURITIC DISORDER: ICD-10-CM

## 2020-10-13 DIAGNOSIS — Z00.00 ROUTINE GENERAL MEDICAL EXAMINATION AT A HEALTH CARE FACILITY: Primary | ICD-10-CM

## 2020-10-13 LAB
CHOLEST SERPL-MCNC: 173 MG/DL
GLUCOSE SERPL-MCNC: 80 MG/DL (ref 70–99)
HDLC SERPL-MCNC: 83 MG/DL
LDLC SERPL CALC-MCNC: 78 MG/DL
NONHDLC SERPL-MCNC: 90 MG/DL
TRIGL SERPL-MCNC: 59 MG/DL

## 2020-10-13 PROCEDURE — 80061 LIPID PANEL: CPT | Performed by: NURSE PRACTITIONER

## 2020-10-13 PROCEDURE — 82947 ASSAY GLUCOSE BLOOD QUANT: CPT | Performed by: NURSE PRACTITIONER

## 2020-10-13 PROCEDURE — 99395 PREV VISIT EST AGE 18-39: CPT | Performed by: NURSE PRACTITIONER

## 2020-10-13 PROCEDURE — 99213 OFFICE O/P EST LOW 20 MIN: CPT | Mod: 25 | Performed by: NURSE PRACTITIONER

## 2020-10-13 RX ORDER — ESTRADIOL 1 MG/1
1 TABLET ORAL DAILY
Qty: 90 TABLET | Refills: 1 | Status: SHIPPED | OUTPATIENT
Start: 2020-10-13 | End: 2021-05-05

## 2020-10-13 ASSESSMENT — MIFFLIN-ST. JEOR: SCORE: 1459.05

## 2020-10-13 NOTE — PROGRESS NOTES
SUBJECTIVE:   CC: Virginia Wood is an 35 year old woman who presents for preventive health visit.       Patient has been advised of split billing requirements and indicates understanding: Yes  Healthy Habits:    Do you get at least three servings of calcium containing foods daily (dairy, green leafy vegetables, etc.)? yes    Amount of exercise or daily activities, outside of work: 2 day(s) per week    Problems taking medications regularly No    Medication side effects: No    Have you had an eye exam in the past two years? yes    Do you see a dentist twice per year? yes    Do you have sleep apnea, excessive snoring or daytime drowsiness?no      Hormones  She needs refill of estrogen that she's been on for years due to early hysterectomy for endometriosis. Negative mammogram last year.  She states she has no libido and wonders if there is anything for that.  Has had itchiness, intense on both forearms, worse at night. No rashes seen. She has tried capsicin which has helped.  Needs labs done for her 's insurance.      Today's PHQ-2 Score:   PHQ-2 (  [employer]) 10/13/2020 10/29/2019   Q1: Little interest or pleasure in doing things 0 0   Q2: Feeling down, depressed or hopeless 0 0   PHQ-2 Score 0 0   Q1: Little interest or pleasure in doing things - -   Q2: Feeling down, depressed or hopeless - -   PHQ-2 Score - -       Abuse: Current or Past(Physical, Sexual or Emotional)- No  Do you feel safe in your environment? Yes        Social History     Tobacco Use     Smoking status: Former Smoker     Packs/day: 0.50     Years: 2.00     Pack years: 1.00     Types: Cigarettes     Quit date: 2008     Years since quittin.7     Smokeless tobacco: Never Used     Tobacco comment: occasional smoker   Substance Use Topics     Alcohol use: Yes     Comment: minimal; quit since pregnancy     If you drink alcohol do you typically have >3 drinks per day or >7 drinks per week? No                     Reviewed orders  with patient.  Reviewed health maintenance and updated orders accordingly - Yes  BP Readings from Last 3 Encounters:   10/13/20 90/60   10/29/19 98/64   02/15/19 110/70    Wt Readings from Last 3 Encounters:   10/13/20 73.9 kg (163 lb)   10/29/19 68.9 kg (152 lb)   02/15/19 64.4 kg (142 lb)                  Patient Active Problem List   Diagnosis     Lumbago     Lumbar radicular pain     CARDIOVASCULAR SCREENING; LDL GOAL LESS THAN 160     Endometriosis     UTI (urinary tract infection)     Status post left oophorectomy     Status post hysterectomy     Surgical menopause     Past Surgical History:   Procedure Laterality Date     C ORAL SURGERY PROCEDURE  2007    wisdom teeth     CYSTOSCOPY N/A 9/10/2018    Procedure: CYSTOSCOPY;;  Surgeon: Marcus Burks MD;  Location: [facility]     HYSTERECTOMY, PAP NO LONGER INDICATED       LAPAROSCOPIC HYSTERECTOMY TOTAL, BILATERAL SALPINGO-OOPHORECTOMY, COMBINED  2013    Procedure: COMBINED LAPAROSCOPIC HYSTERECTOMY TOTAL, SALPINGO-OOPHORECTOMY;  Laparoscopic total hysterectomy & bilateral salpingectomy & left oopherectomy & cystoscopy;  Surgeon: Marcus Burks MD;  Location: [facility]     LAPAROSCOPIC OOPHORECTOMY Right 9/10/2018    Procedure: LAPAROSCOPIC OOPHORECTOMY;  Laparoscopic Right Oophorectomy & Cystoscopy & Retroperitoneal Exploration & Biopsy of Endometriosis;  Surgeon: Marcus Burks MD;  Location: [facility]       Social History     Tobacco Use     Smoking status: Former Smoker     Packs/day: 0.50     Years: 2.00     Pack years: 1.00     Types: Cigarettes     Quit date: 2008     Years since quittin.7     Smokeless tobacco: Never Used     Tobacco comment: occasional smoker   Substance Use Topics     Alcohol use: Yes     Comment: minimal; quit since pregnancy     Family History   Problem Relation Age of Onset     Lipids Maternal Grandmother      Heart Disease Maternal Grandmother      Eye Disorder Maternal Grandmother         macular  degeneration     Diabetes Maternal Grandmother      Hypertension Maternal Grandmother      Depression Mother      Alcohol/Drug Maternal Grandfather         alcohol     Depression Maternal Grandfather      Colon Cancer Maternal Grandfather      Unknown/Adopted Father      Substance Abuse Brother      Asthma No family hx of      Breast Cancer No family hx of      Cancer - colorectal No family hx of      Prostate Cancer No family hx of      Cerebrovascular Disease No family hx of          Current Outpatient Medications   Medication Sig Dispense Refill     estradiol (ESTRACE) 1 MG tablet Take 1 tablet (1 mg) by mouth daily Take 1 tablet sublingually daily 90 tablet 1     ibuprofen (ADVIL/MOTRIN) 200 MG tablet Take 3 tablets (600 mg) by mouth every 6 hours as needed for pain (mild)       Multiple Vitamins-Minerals (DAILY MULTI PO) Take  by mouth.       Multiple Vitamins-Minerals (IMMUNE SUPPORT PO) Immune Support       ondansetron (ZOFRAN) 4 MG tablet Take 1 tablet (4 mg) by mouth every 8 hours as needed for nausea 10 tablet 3     scopolamine (TRANSDERM) 1 MG/3DAYS 72 hr patch Apply 1 patch to hairless area behind one ear at least 4 hours before travel.  Remove old patch and change every 3 days (72 hours). 4 patch 1     TYLENOL 325 MG OR TABS 1 TO 2 TABLETS BY MOUTH EVERY 4 HOURS AS NEEDED FOR PAIN, DO NOT EXCEED 4,000 MG OF ACETAMINOPHEN IN 24 HOURS       Allergies   Allergen Reactions     Nkda [No Known Drug Allergies]      Recent Labs   Lab Test 10/29/19  1005 10/29/18  1111 05/15/14  1310 03/28/13  1352 03/27/13  1730   LDL 64 91  --   --   --    HDL 99 80  --   --   --    TRIG 56 49  --   --   --    ALT  --   --   --   --  28   CR  --   --  0.72 0.59 0.65   GFRESTIMATED  --   --  >90 >90 >90   GFRESTBLACK  --   --  >90 >90 >90   POTASSIUM  --   --  4.6 3.6 3.6            Pertinent mammograms are reviewed under the imaging tab.  History of abnormal Pap smear: Status post benign hysterectomy. Health Maintenance and  Surgical History updated.  PAP / HPV 4/23/2013 12/23/2008 4/4/2008   PAP NIL NIL NIL     Reviewed and updated as needed this visit by clinical staff  Tobacco  Allergies  Meds   Med Hx  Surg Hx  Fam Hx  Soc Hx        Reviewed and updated as needed this visit by Provider                Past Medical History:   Diagnosis Date     Hormone replacement therapy     post hysterectomy     PONV (postoperative nausea and vomiting)       Past Surgical History:   Procedure Laterality Date     C ORAL SURGERY PROCEDURE  6/2007    wisdom teeth     CYSTOSCOPY N/A 9/10/2018    Procedure: CYSTOSCOPY;;  Surgeon: Marcus Burks MD;  Location: [facility]     HYSTERECTOMY, PAP NO LONGER INDICATED       LAPAROSCOPIC HYSTERECTOMY TOTAL, BILATERAL SALPINGO-OOPHORECTOMY, COMBINED  5/20/2013    Procedure: COMBINED LAPAROSCOPIC HYSTERECTOMY TOTAL, SALPINGO-OOPHORECTOMY;  Laparoscopic total hysterectomy & bilateral salpingectomy & left oopherectomy & cystoscopy;  Surgeon: Marcus Burks MD;  Location: [facility]     LAPAROSCOPIC OOPHORECTOMY Right 9/10/2018    Procedure: LAPAROSCOPIC OOPHORECTOMY;  Laparoscopic Right Oophorectomy & Cystoscopy & Retroperitoneal Exploration & Biopsy of Endometriosis;  Surgeon: Marcus Burks MD;  Location: [facility]       ROS:  CONSTITUTIONAL: NEGATIVE for fever, chills, change in weight  INTEGUMENTARY/SKIN: NEGATIVE for worrisome rashes, moles or lesions POSITIVE for itchiness on both forearms  EYES: NEGATIVE for vision changes or irritation  ENT: NEGATIVE for ear, mouth and throat problems  RESP: NEGATIVE for significant cough or SOB  BREAST: NEGATIVE for masses, tenderness or discharge  CV: NEGATIVE for chest pain, palpitations or peripheral edema  GI: NEGATIVE for nausea, abdominal pain, heartburn, or change in bowel habits  : NEGATIVE for unusual urinary or vaginal symptoms. No vaginal bleeding.  MUSCULOSKELETAL: NEGATIVE for significant arthralgias or myalgia  NEURO: NEGATIVE for weakness,  "dizziness or paresthesias  PSYCHIATRIC: NEGATIVE for changes in mood or affect     OBJECTIVE:   BP 90/60 (BP Location: Right arm, Patient Position: Chair, Cuff Size: Adult Regular)   Pulse 68   Temp 97.8  F (36.6  C) (Tympanic)   Resp 16   Ht 1.689 m (5' 6.5\")   Wt 73.9 kg (163 lb)   LMP 04/11/2013 (LMP Unknown)   SpO2 97%   BMI 25.91 kg/m    EXAM:  GENERAL APPEARANCE: healthy, alert and no distress  EYES: Eyes grossly normal to inspection, and conjunctivae and sclerae normal  NECK: no adenopathy, no asymmetry, masses, or scars and thyroid normal to palpation  RESP: lungs clear to auscultation - no rales, rhonchi or wheezes  CV: regular rate and rhythm, normal S1 S2, no S3 or S4, no murmur, click or rub, no peripheral edema and peripheral pulses strong  ABDOMEN: soft, nontender, no hepatosplenomegaly, no masses and bowel sounds normal  MS: no musculoskeletal defects are noted and gait is age appropriate without ataxia  SKIN: no suspicious lesions or rashes  NEURO: Normal strength and tone, sensory exam grossly normal, mentation intact and speech normal  PSYCH: mentation appears normal and affect normal/bright    Diagnostic Test Results:  Labs reviewed in Epic  No results found for this or any previous visit (from the past 24 hour(s)).    ASSESSMENT/PLAN:   1. Routine general medical examination at a health care facility    Refusing flu shot.  Will do labs.    2. Surgical menopause    - estradiol (ESTRACE) 1 MG tablet; Take 1 tablet (1 mg) by mouth daily Take 1 tablet sublingually daily  Dispense: 90 tablet; Refill: 1  Doing well, continue same. Discussed consulting with gynecology for low libido at some point, she declined right now.    3. Pruritic disorder    Continue with capsicin and can consult with dermatology if doesn't resolve.   No clear reasons, denies anxiety.    4. Screening for lipoid disorders    - Lipid Profile (Chol, Trig, HDL, LDL calc)    5. Screening for diabetes mellitus    - " "Glucose    Patient has been advised of split billing requirements and indicates understanding: Yes  COUNSELING:   Reviewed preventive health counseling, as reflected in patient instructions       Regular exercise       Healthy diet/nutrition    Estimated body mass index is 25.91 kg/m  as calculated from the following:    Height as of this encounter: 1.689 m (5' 6.5\").    Weight as of this encounter: 73.9 kg (163 lb).        She reports that she quit smoking about 12 years ago. Her smoking use included cigarettes. She has a 1.00 pack-year smoking history. She has never used smokeless tobacco.      Counseling Resources:  ATP IV Guidelines  Pooled Cohorts Equation Calculator  Breast Cancer Risk Calculator  BRCA-Related Cancer Risk Assessment: FHS-7 Tool  FRAX Risk Assessment  ICSI Preventive Guidelines  Dietary Guidelines for Americans, 2010  USDA's MyPlate  ASA Prophylaxis  Lung CA Screening    MEGAN Stanley CNP  [facility]  "

## 2020-10-13 NOTE — PATIENT INSTRUCTIONS
Try the capsacin and if that doesn't resolve itchiness, will send to dermatology.  Mammogram next year.  Will be notified of pending labs and can  work form at  when complete.    Preventive Health Recommendations  Female Ages 26 - 39  Yearly exam:   See your health care provider every year in order to    Review health changes.     Discuss preventive care.      Review your medicines if you your doctor has prescribed any.    Until age 30: Get a Pap test every three years (more often if you have had an abnormal result).    After age 30: Talk to your doctor about whether you should have a Pap test every 3 years or have a Pap test with HPV screening every 5 years.   You do not need a Pap test if your uterus was removed (hysterectomy) and you have not had cancer.  You should be tested each year for STDs (sexually transmitted diseases), if you're at risk.   Talk to your provider about how often to have your cholesterol checked.  If you are at risk for diabetes, you should have a diabetes test (fasting glucose).  Shots: Get a flu shot each year. Get a tetanus shot every 10 years.   Nutrition:     Eat at least 5 servings of fruits and vegetables each day.    Eat whole-grain bread, whole-wheat pasta and brown rice instead of white grains and rice.    Get adequate Calcium and Vitamin D.     Lifestyle    Exercise at least 150 minutes a week (30 minutes a day, 5 days of the week). This will help you control your weight and prevent disease.    Limit alcohol to one drink per day.    No smoking.     Wear sunscreen to prevent skin cancer.    See your dentist every six months for an exam and cleaning.

## 2020-11-16 ENCOUNTER — HEALTH MAINTENANCE LETTER (OUTPATIENT)
Age: 35
End: 2020-11-16

## 2021-02-02 ENCOUNTER — TELEPHONE (OUTPATIENT)
Dept: FAMILY MEDICINE | Facility: CLINIC | Age: 36
End: 2021-02-02

## 2021-02-02 DIAGNOSIS — T75.3XXA MOTION SICKNESS, INITIAL ENCOUNTER: ICD-10-CM

## 2021-02-03 NOTE — TELEPHONE ENCOUNTER
"Requested Prescriptions   Pending Prescriptions Disp Refills     ondansetron (ZOFRAN) 4 MG tablet 10 tablet 3     Sig: Take 1 tablet (4 mg) by mouth every 8 hours as needed for nausea        Antivertigo/Antiemetic Agents Passed - 2/2/2021  4:53 PM        Passed - Recent (12 mo) or future (30 days) visit within the authorizing provider's specialty     Patient has had an office visit with the authorizing provider or a provider within the authorizing providers department within the previous 12 mos or has a future within next 30 days. See \"Patient Info\" tab in inbasket, or \"Choose Columns\" in Meds & Orders section of the refill encounter.              Passed - Medication is active on med list        Passed - Patient is 18 years of age or older             "

## 2021-02-04 NOTE — TELEPHONE ENCOUNTER
I haven't seen since 2018.  Would be more appropriate to route to RAYMOND Newman NP.  Ute Stearns M.D.

## 2021-02-04 NOTE — TELEPHONE ENCOUNTER
Routing refill request to provider for review/approval because:  Last written 2/15/19.  Is this an ongoing med?  Advise.  [place]

## 2021-02-05 ENCOUNTER — MYC MEDICAL ADVICE (OUTPATIENT)
Dept: FAMILY MEDICINE | Facility: CLINIC | Age: 36
End: 2021-02-05

## 2021-02-05 DIAGNOSIS — T75.3XXA SEA SICKNESS, INITIAL ENCOUNTER: ICD-10-CM

## 2021-02-05 DIAGNOSIS — T75.3XXA MOTION SICKNESS, INITIAL ENCOUNTER: ICD-10-CM

## 2021-02-05 RX ORDER — ONDANSETRON 4 MG/1
4 TABLET, FILM COATED ORAL EVERY 8 HOURS PRN
Qty: 10 TABLET | Refills: 3 | OUTPATIENT
Start: 2021-02-05

## 2021-02-05 RX ORDER — SCOLOPAMINE TRANSDERMAL SYSTEM 1 MG/1
PATCH, EXTENDED RELEASE TRANSDERMAL
Qty: 4 PATCH | Refills: 1 | Status: SHIPPED | OUTPATIENT
Start: 2021-02-05 | End: 2021-11-30

## 2021-02-05 RX ORDER — ONDANSETRON 4 MG/1
4 TABLET, FILM COATED ORAL EVERY 8 HOURS PRN
Qty: 10 TABLET | Refills: 3 | Status: SHIPPED | OUTPATIENT
Start: 2021-02-05 | End: 2021-11-30

## 2021-05-05 ENCOUNTER — MYC MEDICAL ADVICE (OUTPATIENT)
Dept: FAMILY MEDICINE | Facility: CLINIC | Age: 36
End: 2021-05-05

## 2021-05-05 DIAGNOSIS — E89.40 SURGICAL MENOPAUSE: ICD-10-CM

## 2021-05-05 RX ORDER — ESTRADIOL 1 MG/1
1 TABLET ORAL DAILY
Qty: 90 TABLET | Refills: 1 | Status: SHIPPED | OUTPATIENT
Start: 2021-05-05 | End: 2021-06-14

## 2021-06-13 ENCOUNTER — MYC MEDICAL ADVICE (OUTPATIENT)
Dept: FAMILY MEDICINE | Facility: CLINIC | Age: 36
End: 2021-06-13

## 2021-06-14 DIAGNOSIS — E89.40 SURGICAL MENOPAUSE: ICD-10-CM

## 2021-06-14 RX ORDER — ESTRADIOL 1 MG/1
2 TABLET ORAL DAILY
Qty: 90 TABLET | Refills: 1 | COMMUNITY
Start: 2021-06-14 | End: 2021-07-06 | Stop reason: DRUGHIGH

## 2021-06-14 NOTE — TELEPHONE ENCOUNTER
Cathy Newman, MEGAN CNP  Cl Provider [facility] 24 minutes ago (10:30 AM)     Yes increase to 2 mg and see if that helps.   Let us know, thanks.   Cathy Newman, CAROLINE          Pt given msg to increase Estradiol to 2 mg/day. KPavelRN

## 2021-07-06 ENCOUNTER — MYC MEDICAL ADVICE (OUTPATIENT)
Dept: FAMILY MEDICINE | Facility: CLINIC | Age: 36
End: 2021-07-06

## 2021-07-06 DIAGNOSIS — E89.40 SURGICAL MENOPAUSE: ICD-10-CM

## 2021-07-06 DIAGNOSIS — E89.40 SURGICAL MENOPAUSE: Primary | ICD-10-CM

## 2021-07-06 RX ORDER — ESTRADIOL 2 MG/1
2 TABLET ORAL DAILY
Qty: 90 TABLET | Refills: 1 | Status: SHIPPED | OUTPATIENT
Start: 2021-07-06 | End: 2021-11-30

## 2021-09-12 ENCOUNTER — HEALTH MAINTENANCE LETTER (OUTPATIENT)
Age: 36
End: 2021-09-12

## 2021-11-30 ENCOUNTER — OFFICE VISIT (OUTPATIENT)
Dept: FAMILY MEDICINE | Facility: CLINIC | Age: 36
End: 2021-11-30
Payer: COMMERCIAL

## 2021-11-30 VITALS
OXYGEN SATURATION: 98 % | RESPIRATION RATE: 16 BRPM | HEIGHT: 67 IN | WEIGHT: 152 LBS | SYSTOLIC BLOOD PRESSURE: 102 MMHG | HEART RATE: 70 BPM | TEMPERATURE: 98.6 F | DIASTOLIC BLOOD PRESSURE: 60 MMHG | BODY MASS INDEX: 23.86 KG/M2

## 2021-11-30 DIAGNOSIS — Z12.31 ENCOUNTER FOR SCREENING MAMMOGRAM FOR BREAST CANCER: ICD-10-CM

## 2021-11-30 DIAGNOSIS — T75.3XXA MOTION SICKNESS, INITIAL ENCOUNTER: ICD-10-CM

## 2021-11-30 DIAGNOSIS — E89.40 SURGICAL MENOPAUSE: ICD-10-CM

## 2021-11-30 DIAGNOSIS — Z00.00 ROUTINE GENERAL MEDICAL EXAMINATION AT A HEALTH CARE FACILITY: Primary | ICD-10-CM

## 2021-11-30 DIAGNOSIS — T75.3XXA SEA SICKNESS, INITIAL ENCOUNTER: ICD-10-CM

## 2021-11-30 DIAGNOSIS — D22.9 BENIGN MOLE: ICD-10-CM

## 2021-11-30 PROCEDURE — 99213 OFFICE O/P EST LOW 20 MIN: CPT | Mod: 25 | Performed by: NURSE PRACTITIONER

## 2021-11-30 PROCEDURE — 99395 PREV VISIT EST AGE 18-39: CPT | Performed by: NURSE PRACTITIONER

## 2021-11-30 RX ORDER — ESTRADIOL 2 MG/1
2 TABLET ORAL DAILY
Qty: 90 TABLET | Refills: 1 | Status: SHIPPED | OUTPATIENT
Start: 2021-11-30 | End: 2022-06-21

## 2021-11-30 RX ORDER — SCOLOPAMINE TRANSDERMAL SYSTEM 1 MG/1
PATCH, EXTENDED RELEASE TRANSDERMAL
Qty: 4 PATCH | Refills: 1 | Status: SHIPPED | OUTPATIENT
Start: 2021-11-30 | End: 2022-12-07

## 2021-11-30 RX ORDER — ONDANSETRON 4 MG/1
4 TABLET, FILM COATED ORAL EVERY 8 HOURS PRN
Qty: 10 TABLET | Refills: 3 | Status: SHIPPED | OUTPATIENT
Start: 2021-11-30 | End: 2022-12-07

## 2021-11-30 ASSESSMENT — ENCOUNTER SYMPTOMS
HEARTBURN: 0
DYSURIA: 0
CHILLS: 0
SORE THROAT: 0
WEAKNESS: 0
HEADACHES: 0
NERVOUS/ANXIOUS: 0
COUGH: 0
HEMATOCHEZIA: 0
JOINT SWELLING: 0
PALPITATIONS: 0
HEMATURIA: 0
ARTHRALGIAS: 0
ABDOMINAL PAIN: 0
MYALGIAS: 0
NAUSEA: 0
BREAST MASS: 0
DIZZINESS: 0
CONSTIPATION: 0
EYE PAIN: 0
SHORTNESS OF BREATH: 0
FEVER: 0
PARESTHESIAS: 0
FREQUENCY: 0
DIARRHEA: 0

## 2021-11-30 ASSESSMENT — MIFFLIN-ST. JEOR: SCORE: 1404.16

## 2021-11-30 NOTE — PROGRESS NOTES
SUBJECTIVE:    36-year-old female presents to clinic for a routine physical, questions about hormone testing, and request for a dermatology referral. Patient would like a dermatology referral for a skin check. She has a mole on her head which has been present for years without changes. She would like a full skin check completed for routine screening. She denies any concerning moles, rashes, or lesions. Patient also requests hormone testing, reporting labile mood and hot flashes. She had a total hysterectomy and bilateral oophorectomy, takes estradiol consistently. She would like an estradiol refill today. She continues to have motion sickness with travel, would like refills for ondansetron and scopolamine.     Patient has routine vision checks, last being ~1.5 years ago. She reports she is due for this. She has glasses that she requires intermittently only. Routine dental visits, last cleaning completed over the summer. She reports no changes to her medical or surgical history.    Patient is not interested in influenza or COVID vaccination. Declines hepatitis c screening.    Review Of Systems  Skin: mole on left scalp, present for years without changes.  Head: denies hair loss  Eyes: denies vision changes  Ears/Nose/Throat: denies hearing changes or oral pain  Respiratory: Denies SOB  Cardiovascular: Denies CP  Gastrointestinal: regular bowel movements, denies constipation   Genitourinary: denies urinary symptoms, denies vaginal dryness, denies dyspareunia   Musculoskeletal: denies arthralgia or myalgia  Neurologic: denies weakness  Psychiatric: reports mood swings      Answers for HPI/ROS submitted by the patient on 11/30/2021  Frequency of exercise:: 4-5 days/week  Getting at least 3 servings of Calcium per day:: Yes  Diet:: Regular (no restrictions)  Taking medications regularly:: Yes  Medication side effects:: None  Bi-annual eye exam:: Yes  Dental care twice a year:: Yes  Sleep apnea or symptoms of sleep  "apnea:: None  abdominal pain: No  Blood in stool: No  Blood in urine: No  chest pain: No  chills: No  congestion: No  constipation: No  cough: No  diarrhea: No  dizziness: No  ear pain: No  eye pain: No  nervous/anxious: No  fever: No  frequency: No  genital sores: No  headaches: No  hearing loss: No  heartburn: No  arthralgias: No  joint swelling: No  peripheral edema: No  mood changes: No  myalgias: No  nausea: No  dysuria: No  palpitations: No  Skin sensation changes: No  sore throat: No  urgency: No  rash: No  shortness of breath: No  visual disturbance: No  weakness: No  pelvic pain: No  vaginal bleeding: No  vaginal discharge: No  tenderness: No  breast mass: No  breast discharge: No  Additional concerns today:: No  Duration of exercise:: 30-45 minutes    OBJECTIVE:  /60   Pulse 70   Temp 98.6  F (37  C)   Resp 16   Ht 1.689 m (5' 6.5\")   Wt 68.9 kg (152 lb)   LMP 04/11/2013 (LMP Unknown)   SpO2 98%   BMI 24.17 kg/m       Exam:  Constitutional: healthy, alert and no distress  Head: Normocephalic with symmetric hair distribution  Neck: Neck supple. No adenopathy. Thyroid symmetric and smooth  EENT: PERRL, sclera white bilaterally, TMs gray and intact without erythema or bulging, nasal mucosa without erythema or edema, buccal mucosa intact, oropharynx without erythema or exudate  Cardiovascular: RRR, S1 and S2, no murmur, click, or rub  Respiratory: clear to auscultation bilaterally, no wheezes, rhonchi, or rales  Gastrointestinal: Flat and symmetric appearance, active bowel tones in all quadrants, no hepatosplenomegaly or masses, no tenderness with palpation  Musculoskeletal: no gross deformities, BUE and BLE strength 5/5  Skin: flesh toned, round, symmetric, raised mole with regular borders to left lateral scalp  Neurologic: Typical gait pattern, speech clear and intact  Psychiatric: bright affect, well-paced speech      ASSESSMENT/PLAN:  Per encounter diagnoses and orders.    Lauren " Louie  Student nurse practitioner  ----- Services Performed by a STUDENT in Presence of ATTENDING Physician-------

## 2021-11-30 NOTE — PROGRESS NOTES
"   SUBJECTIVE:   CC: Virginia Wood is an 36 year old woman who presents for preventive health visit.       Patient has been advised of split billing requirements and indicates understanding: Yes  Healthy Habits:     Getting at least 3 servings of Calcium per day:  Yes    Bi-annual eye exam:  Yes    Dental care twice a year:  Yes    Sleep apnea or symptoms of sleep apnea:  None    Diet:  Regular (no restrictions)    Frequency of exercise:  4-5 days/week    Duration of exercise:  30-45 minutes    Taking medications regularly:  Yes    Medication side effects:  None    PHQ-2 Total Score: 0    Additional concerns today:  No          Patient would like to discuss hormone tests  Feels very keys. Hysterectomy at age 27 for endometriosis, both ovaries gone. Is taking Estrace but feels sometimes 'It's not working\"  Also has had a mole in her scalp for years, would like to see dermatology for that.  Used to be a nurse, now works as a .   She is refusing flu or COVID vaccines.      Today's PHQ-2 Score:   PHQ-2 ( 1999 Pfizer) 11/30/2021   Q1: Little interest or pleasure in doing things 0   Q2: Feeling down, depressed or hopeless 0   PHQ-2 Score 0   PHQ-2 Total Score (12-17 Years)- Positive if 3 or more points; Administer PHQ-A if positive -   Q1: Little interest or pleasure in doing things Not at all   Q2: Feeling down, depressed or hopeless Not at all   PHQ-2 Score 0       Abuse: Current or Past (Physical, Sexual or Emotional) - No  Do you feel safe in your environment? Yes    Have you ever done Advance Care Planning? (For example, a Health Directive, POLST, or a discussion with a medical provider or your loved ones about your wishes): No, advance care planning information given to patient to review.  Patient plans to discuss their wishes with loved ones or provider.      Social History     Tobacco Use     Smoking status: Former Smoker     Packs/day: 0.50     Years: 2.00     Pack years: 1.00     Types: " Cigarettes     Quit date: 2008     Years since quittin.8     Smokeless tobacco: Never Used     Tobacco comment: occasional smoker   Substance Use Topics     Alcohol use: Yes     Comment: minimal; quit since pregnancy         Alcohol Use 2021   Prescreen: >3 drinks/day or >7 drinks/week? Not Applicable   Prescreen: >3 drinks/day or >7 drinks/week? -       Reviewed orders with patient.  Reviewed health maintenance and updated orders accordingly - Yes  BP Readings from Last 3 Encounters:   21 102/60   10/13/20 90/60   10/29/19 98/64    Wt Readings from Last 3 Encounters:   21 68.9 kg (152 lb)   10/13/20 73.9 kg (163 lb)   10/29/19 68.9 kg (152 lb)                  Patient Active Problem List   Diagnosis     Lumbago     Lumbar radicular pain     CARDIOVASCULAR SCREENING; LDL GOAL LESS THAN 160     Endometriosis     UTI (urinary tract infection)     Status post left oophorectomy     Status post hysterectomy     Surgical menopause     Past Surgical History:   Procedure Laterality Date     C ORAL SURGERY PROCEDURE  2007    wisdom teeth     CYSTOSCOPY N/A 9/10/2018    Procedure: CYSTOSCOPY;;  Surgeon: Marcus Burks MD;  Location: [facility]     HYSTERECTOMY, PAP NO LONGER INDICATED       LAPAROSCOPIC HYSTERECTOMY TOTAL, BILATERAL SALPINGO-OOPHORECTOMY, COMBINED  2013    Procedure: COMBINED LAPAROSCOPIC HYSTERECTOMY TOTAL, SALPINGO-OOPHORECTOMY;  Laparoscopic total hysterectomy & bilateral salpingectomy & left oopherectomy & cystoscopy;  Surgeon: Marcus Burks MD;  Location: [facility]     LAPAROSCOPIC OOPHORECTOMY Right 9/10/2018    Procedure: LAPAROSCOPIC OOPHORECTOMY;  Laparoscopic Right Oophorectomy & Cystoscopy & Retroperitoneal Exploration & Biopsy of Endometriosis;  Surgeon: Marcus Burks MD;  Location: [facility]       Social History     Tobacco Use     Smoking status: Former Smoker     Packs/day: 0.50     Years: 2.00     Pack years: 1.00     Types: Cigarettes     Quit date:  2008     Years since quittin.8     Smokeless tobacco: Never Used     Tobacco comment: occasional smoker   Substance Use Topics     Alcohol use: Not Currently     Family History   Problem Relation Age of Onset     Lipids Maternal Grandmother      Heart Disease Maternal Grandmother      Eye Disorder Maternal Grandmother         macular degeneration     Diabetes Maternal Grandmother      Hypertension Maternal Grandmother      Depression Mother      Alcohol/Drug Maternal Grandfather         alcohol     Depression Maternal Grandfather      Colon Cancer Maternal Grandfather      Unknown/Adopted Father      Substance Abuse Brother      Asthma No family hx of      Breast Cancer No family hx of      Cancer - colorectal No family hx of      Prostate Cancer No family hx of      Cerebrovascular Disease No family hx of          Current Outpatient Medications   Medication Sig Dispense Refill     estradiol (ESTRACE) 2 MG tablet Take 1 tablet (2 mg) by mouth daily 90 tablet 1     ibuprofen (ADVIL/MOTRIN) 200 MG tablet Take 3 tablets (600 mg) by mouth every 6 hours as needed for pain (mild)       Multiple Vitamins-Minerals (DAILY MULTI PO) Take  by mouth.       Multiple Vitamins-Minerals (IMMUNE SUPPORT PO) Immune Support       ondansetron (ZOFRAN) 4 MG tablet Take 1 tablet (4 mg) by mouth every 8 hours as needed for nausea 10 tablet 3     scopolamine (TRANSDERM) 1 MG/3DAYS 72 hr patch Apply 1 patch to hairless area behind one ear at least 4 hours before travel.  Remove old patch and change every 3 days (72 hours). 4 patch 1     TYLENOL 325 MG OR TABS 1 TO 2 TABLETS BY MOUTH EVERY 4 HOURS AS NEEDED FOR PAIN, DO NOT EXCEED 4,000 MG OF ACETAMINOPHEN IN 24 HOURS       Allergies   Allergen Reactions     Nkda [No Known Drug Allergies]      Recent Labs   Lab Test 10/13/20  0857 10/29/19  1005 10/29/18  1111 05/15/14  1310   LDL 78 64 91  --    HDL 83 99 80  --    TRIG 59 56 49  --    CR  --   --   --  0.72   GFRESTIMATED  --   --    --  >90   GFRESTBLACK  --   --   --  >90   POTASSIUM  --   --   --  4.6        Breast Cancer Screening:    FHS-7:   Breast CA Risk Assessment (FHS-7) 11/30/2021   Did any of your first-degree relatives have breast or ovarian cancer? No   Did any of your relatives have bilateral breast cancer? No   Did any man in your family have breast cancer? No   Did any woman in your family have breast and ovarian cancer? No   Did any woman in your family have breast cancer before age 50 y? No   Do you have 2 or more relatives with breast and/or ovarian cancer? No   Do you have 2 or more relatives with breast and/or bowel cancer? Yes         Pertinent mammograms are reviewed under the imaging tab.    History of abnormal Pap smear: Status post benign hysterectomy. Health Maintenance and Surgical History updated.  PAP / HPV 4/23/2013 12/23/2008 4/4/2008   PAP (Historical) NIL NIL NIL     Reviewed and updated as needed this visit by clinical staff  Tobacco  Allergies  Meds   Med Hx  Surg Hx  Fam Hx  Soc Hx       Reviewed and updated as needed this visit by Provider               Past Medical History:   Diagnosis Date     Hormone replacement therapy     post hysterectomy     PONV (postoperative nausea and vomiting)       Past Surgical History:   Procedure Laterality Date     C ORAL SURGERY PROCEDURE  6/2007    wisdom teeth     CYSTOSCOPY N/A 9/10/2018    Procedure: CYSTOSCOPY;;  Surgeon: Marcus Burks MD;  Location: [facility]     HYSTERECTOMY, PAP NO LONGER INDICATED       LAPAROSCOPIC HYSTERECTOMY TOTAL, BILATERAL SALPINGO-OOPHORECTOMY, COMBINED  5/20/2013    Procedure: COMBINED LAPAROSCOPIC HYSTERECTOMY TOTAL, SALPINGO-OOPHORECTOMY;  Laparoscopic total hysterectomy & bilateral salpingectomy & left oopherectomy & cystoscopy;  Surgeon: Marcus Burks MD;  Location: [facility]     LAPAROSCOPIC OOPHORECTOMY Right 9/10/2018    Procedure: LAPAROSCOPIC OOPHORECTOMY;  Laparoscopic Right Oophorectomy & Cystoscopy &  "Retroperitoneal Exploration & Biopsy of Endometriosis;  Surgeon: Marcus Burks MD;  Location: [facility]       Review of Systems   Constitutional: Negative for chills and fever.   HENT: Negative for congestion, ear pain, hearing loss and sore throat.    Eyes: Negative for pain and visual disturbance.   Respiratory: Negative for cough and shortness of breath.    Cardiovascular: Negative for chest pain, palpitations and peripheral edema.   Gastrointestinal: Negative for abdominal pain, constipation, diarrhea, heartburn, hematochezia and nausea.   Breasts:  Negative for tenderness, breast mass and discharge.   Genitourinary: Negative for dysuria, frequency, genital sores, hematuria, pelvic pain, urgency, vaginal bleeding and vaginal discharge.   Musculoskeletal: Negative for arthralgias, joint swelling and myalgias.   Skin: Negative for rash.   Neurological: Negative for dizziness, weakness, headaches and paresthesias.   Psychiatric/Behavioral: Negative for mood changes. The patient is not nervous/anxious.           OBJECTIVE:   /60   Pulse 70   Temp 98.6  F (37  C)   Resp 16   Ht 1.689 m (5' 6.5\")   Wt 68.9 kg (152 lb)   LMP 04/11/2013 (LMP Unknown)   SpO2 98%   BMI 24.17 kg/m    Physical Exam  GENERAL: healthy, alert and no distress  EYES: Eyes grossly normal to inspection and conjunctivae and sclerae normal  HENT: ear canals and TM's normal, nose and mouth without ulcers or lesions  NECK: no adenopathy, no asymmetry, masses, or scars and thyroid normal to palpation  RESP: lungs clear to auscultation - no rales, rhonchi or wheezes  CV: regular rate and rhythm, normal S1 S2, no S3 or S4, no murmur, click or rub, no peripheral edema and peripheral pulses strong  ABDOMEN: soft, nontender, no hepatosplenomegaly, no masses and bowel sounds normal  MS: no gross musculoskeletal defects noted, no edema  SKIN: no suspicious lesions or rashes  NEURO: Normal strength and tone, mentation intact and speech " normal  PSYCH: mentation appears normal, affect normal/bright    Diagnostic Test Results:  Labs reviewed in Epic  No results found for this or any previous visit (from the past 24 hour(s)).    ASSESSMENT/PLAN:       ICD-10-CM    1. Routine general medical examination at a health care facility  Z00.00    2. Motion sickness, initial encounter  T75.3XXA ondansetron (ZOFRAN) 4 MG tablet   3. Sea sickness, initial encounter  T75.3XXA scopolamine (TRANSDERM) 1 MG/3DAYS 72 hr patch   4. Surgical menopause  E89.40 estradiol (ESTRACE) 2 MG tablet   5. Benign mole  D22.9 Adult Dermatology Referral   6. Encounter for screening mammogram for breast cancer  Z12.31 *MA Screening Digital Bilateral       Doing well, refilled travel medications. Will see dermatology for general skin assessment and follow up with Gynecology for hormone concerns.  Refusing labs or immunizations, including COVID and flu shot.  Patient Instructions   Follow up with GYN and see dermatology for skin assessment.  Schedule mammogram, [phone].    Preventive Health Recommendations  Female Ages 26 - 39  Yearly exam:   See your health care provider every year in order to    Review health changes.     Discuss preventive care.      Review your medicines if you your doctor has prescribed any.    Until age 30: Get a Pap test every three years (more often if you have had an abnormal result).    After age 30: Talk to your doctor about whether you should have a Pap test every 3 years or have a Pap test with HPV screening every 5 years.   You do not need a Pap test if your uterus was removed (hysterectomy) and you have not had cancer.  You should be tested each year for STDs (sexually transmitted diseases), if you're at risk.   Talk to your provider about how often to have your cholesterol checked.  If you are at risk for diabetes, you should have a diabetes test (fasting glucose).  Shots: Get a flu shot each year. Get a tetanus shot every 10 years.   Nutrition:  "    Eat at least 5 servings of fruits and vegetables each day.    Eat whole-grain bread, whole-wheat pasta and brown rice instead of white grains and rice.    Get adequate Calcium and Vitamin D.     Lifestyle    Exercise at least 150 minutes a week (30 minutes a day, 5 days of the week). This will help you control your weight and prevent disease.    Limit alcohol to one drink per day.    No smoking.     Wear sunscreen to prevent skin cancer.    See your dentist every six months for an exam and cleaning.        COUNSELING:  Reviewed preventive health counseling, as reflected in patient instructions       Regular exercise       Healthy diet/nutrition    Estimated body mass index is 24.17 kg/m  as calculated from the following:    Height as of this encounter: 1.689 m (5' 6.5\").    Weight as of this encounter: 68.9 kg (152 lb).        She reports that she quit smoking about 13 years ago. Her smoking use included cigarettes. She has a 1.00 pack-year smoking history. She has never used smokeless tobacco.      Counseling Resources:  ATP IV Guidelines  Pooled Cohorts Equation Calculator  Breast Cancer Risk Calculator  BRCA-Related Cancer Risk Assessment: FHS-7 Tool  FRAX Risk Assessment  ICSI Preventive Guidelines  Dietary Guidelines for Americans, 2010  USDA's MyPlate  ASA Prophylaxis  Lung CA Screening    Cathy Newman, MEGAN CNP  [facility]  "

## 2021-11-30 NOTE — PATIENT INSTRUCTIONS
Follow up with GYN and see dermatology for skin assessment.  Schedule mammogram, [phone].    Preventive Health Recommendations  Female Ages 26 - 39  Yearly exam:   See your health care provider every year in order to    Review health changes.     Discuss preventive care.      Review your medicines if you your doctor has prescribed any.    Until age 30: Get a Pap test every three years (more often if you have had an abnormal result).    After age 30: Talk to your doctor about whether you should have a Pap test every 3 years or have a Pap test with HPV screening every 5 years.   You do not need a Pap test if your uterus was removed (hysterectomy) and you have not had cancer.  You should be tested each year for STDs (sexually transmitted diseases), if you're at risk.   Talk to your provider about how often to have your cholesterol checked.  If you are at risk for diabetes, you should have a diabetes test (fasting glucose).  Shots: Get a flu shot each year. Get a tetanus shot every 10 years.   Nutrition:     Eat at least 5 servings of fruits and vegetables each day.    Eat whole-grain bread, whole-wheat pasta and brown rice instead of white grains and rice.    Get adequate Calcium and Vitamin D.     Lifestyle    Exercise at least 150 minutes a week (30 minutes a day, 5 days of the week). This will help you control your weight and prevent disease.    Limit alcohol to one drink per day.    No smoking.     Wear sunscreen to prevent skin cancer.    See your dentist every six months for an exam and cleaning.

## 2022-06-21 DIAGNOSIS — E89.40 SURGICAL MENOPAUSE: ICD-10-CM

## 2022-06-21 RX ORDER — ESTRADIOL 2 MG/1
2 TABLET ORAL DAILY
Qty: 90 TABLET | Refills: 1 | Status: SHIPPED | OUTPATIENT
Start: 2022-06-21 | End: 2022-12-07

## 2022-06-21 NOTE — TELEPHONE ENCOUNTER
Prescription approved per [facility] Refill Protocol     Nahomi Anne RN MSN

## 2022-11-19 ENCOUNTER — HEALTH MAINTENANCE LETTER (OUTPATIENT)
Age: 37
End: 2022-11-19

## 2022-12-06 ASSESSMENT — ENCOUNTER SYMPTOMS
BREAST MASS: 0
COUGH: 0
PALPITATIONS: 0
ARTHRALGIAS: 0
HEMATOCHEZIA: 0
EYE PAIN: 0
HEARTBURN: 0
SORE THROAT: 0
NERVOUS/ANXIOUS: 0
DIARRHEA: 0
HEMATURIA: 0
CONSTIPATION: 0
DIZZINESS: 0
HEADACHES: 0
JOINT SWELLING: 0
ABDOMINAL PAIN: 0
FEVER: 0
DYSURIA: 0
FREQUENCY: 0
CHILLS: 0
NAUSEA: 0
WEAKNESS: 0
SHORTNESS OF BREATH: 0
PARESTHESIAS: 0
MYALGIAS: 0

## 2022-12-07 ENCOUNTER — OFFICE VISIT (OUTPATIENT)
Dept: FAMILY MEDICINE | Facility: CLINIC | Age: 37
End: 2022-12-07
Payer: COMMERCIAL

## 2022-12-07 VITALS
DIASTOLIC BLOOD PRESSURE: 72 MMHG | BODY MASS INDEX: 25.94 KG/M2 | TEMPERATURE: 96.9 F | OXYGEN SATURATION: 97 % | SYSTOLIC BLOOD PRESSURE: 106 MMHG | WEIGHT: 161.4 LBS | RESPIRATION RATE: 20 BRPM | HEART RATE: 69 BPM | HEIGHT: 66 IN

## 2022-12-07 DIAGNOSIS — T75.3XXA MOTION SICKNESS, INITIAL ENCOUNTER: ICD-10-CM

## 2022-12-07 DIAGNOSIS — Z00.00 ENCOUNTER FOR SCREENING AND PREVENTATIVE CARE: Primary | ICD-10-CM

## 2022-12-07 DIAGNOSIS — T75.3XXA SEA SICKNESS, INITIAL ENCOUNTER: ICD-10-CM

## 2022-12-07 DIAGNOSIS — Z12.31 ENCOUNTER FOR SCREENING MAMMOGRAM FOR BREAST CANCER: ICD-10-CM

## 2022-12-07 DIAGNOSIS — S93.401S SPRAIN OF RIGHT ANKLE, UNSPECIFIED LIGAMENT, SEQUELA: ICD-10-CM

## 2022-12-07 DIAGNOSIS — E89.40 SURGICAL MENOPAUSE: ICD-10-CM

## 2022-12-07 PROCEDURE — 99395 PREV VISIT EST AGE 18-39: CPT | Mod: 25 | Performed by: FAMILY MEDICINE

## 2022-12-07 PROCEDURE — 99213 OFFICE O/P EST LOW 20 MIN: CPT | Mod: 25 | Performed by: FAMILY MEDICINE

## 2022-12-07 PROCEDURE — 90746 HEPB VACCINE 3 DOSE ADULT IM: CPT | Performed by: FAMILY MEDICINE

## 2022-12-07 PROCEDURE — 90471 IMMUNIZATION ADMIN: CPT | Performed by: FAMILY MEDICINE

## 2022-12-07 RX ORDER — ONDANSETRON 4 MG/1
4 TABLET, FILM COATED ORAL EVERY 8 HOURS PRN
Qty: 10 TABLET | Refills: 6 | Status: SHIPPED | OUTPATIENT
Start: 2022-12-07 | End: 2024-02-09

## 2022-12-07 RX ORDER — SCOLOPAMINE TRANSDERMAL SYSTEM 1 MG/1
PATCH, EXTENDED RELEASE TRANSDERMAL
Qty: 4 PATCH | Refills: 3 | Status: SHIPPED | OUTPATIENT
Start: 2022-12-07 | End: 2024-02-09

## 2022-12-07 RX ORDER — ESTRADIOL 2 MG/1
2 TABLET ORAL DAILY
Qty: 90 TABLET | Refills: 4 | Status: SHIPPED | OUTPATIENT
Start: 2022-12-07 | End: 2024-02-09

## 2022-12-07 ASSESSMENT — ENCOUNTER SYMPTOMS
DIZZINESS: 0
SORE THROAT: 0
FREQUENCY: 0
ABDOMINAL PAIN: 0
PARESTHESIAS: 0
NAUSEA: 0
ARTHRALGIAS: 0
WEAKNESS: 0
HEMATURIA: 0
NERVOUS/ANXIOUS: 0
EYE PAIN: 0
HEMATOCHEZIA: 0
HEADACHES: 0
DIARRHEA: 0
CHILLS: 0
CONSTIPATION: 0
FEVER: 0
JOINT SWELLING: 0
BREAST MASS: 0
COUGH: 0
HEARTBURN: 0
MYALGIAS: 0
PALPITATIONS: 0
SHORTNESS OF BREATH: 0
DYSURIA: 0

## 2022-12-07 ASSESSMENT — PAIN SCALES - GENERAL: PAINLEVEL: NO PAIN (0)

## 2022-12-07 NOTE — PROGRESS NOTES
SUBJECTIVE:   CC: Virginia is an 37 year old who presents for preventive health visit.     Patient has been advised of split billing requirements and indicates understanding: Yes     Healthy Habits:     Getting at least 3 servings of Calcium per day:  Yes    Bi-annual eye exam:  NO    Dental care twice a year:  Yes    Sleep apnea or symptoms of sleep apnea:  None    Diet:  Regular (no restrictions)    Frequency of exercise:  4-5 days/week    Duration of exercise:  45-60 minutes    Taking medications regularly:  Yes    Medication side effects:  Not applicable    PHQ-2 Total Score: 0    Additional concerns today:  No    Patient states that she injured her right ankle 2-3 weeks ago. Rolled her ankle, the whole inside of foot was black and blue. Outside was very swollen. It didn't hurt too bad, but looked very bad. Would just like it checked out.  Was injured badly when she was a teenager.     Patient states that she has some concerns with her estriadol dosage. Feels off temperature wise. She get hot flashes and set temp very cold    Patient would like a mammo referral. Was told to do them every other year because of the estrogen.    Today's PHQ-2 Score:   PHQ-2 (  [employer]) 2022   Q1: Little interest or pleasure in doing things 0   Q2: Feeling down, depressed or hopeless 0   PHQ-2 Score 0   PHQ-2 Total Score (12-17 Years)- Positive if 3 or more points; Administer PHQ-A if positive -   Q1: Little interest or pleasure in doing things Not at all   Q2: Feeling down, depressed or hopeless Not at all   PHQ-2 Score 0     Social History     Tobacco Use     Smoking status: Former     Packs/day: 1.00     Years: 3.00     Pack years: 3.00     Types: Cigarettes     Quit date: 2008     Years since quittin.8     Smokeless tobacco: Never     Tobacco comments:     occasional smoker   Substance Use Topics     Alcohol use: Not Currently     If you drink alcohol do you typically have >3 drinks per day or >7 drinks per  week? No    Alcohol Use 12/7/2022   Prescreen: >3 drinks/day or >7 drinks/week? -   Prescreen: >3 drinks/day or >7 drinks/week? No     Reviewed orders with patient. Reviewed health maintenance and updated orders accordingly - Yes    Breast Cancer Screening:  FHS-7:   Breast CA Risk Assessment (FHS-7) 11/30/2021 12/6/2022   Did any of your first-degree relatives have breast or ovarian cancer? No No   Did any of your relatives have bilateral breast cancer? No No   Did any man in your family have breast cancer? No No   Did any woman in your family have breast and ovarian cancer? No No   Did any woman in your family have breast cancer before age 50 y? No No   Do you have 2 or more relatives with breast and/or ovarian cancer? No No   Do you have 2 or more relatives with breast and/or bowel cancer? Yes Yes     She had severe endometriosis and had to have one ovary and uterus removed in 2013.  Started estradiol in 2018 when she had to have second ovary removed    Patient under 40 years of age: Routine Mammogram Screening not recommended.   Pertinent mammograms are reviewed under the imaging tab.     History of abnormal Pap smear: Status post benign hysterectomy. Health Maintenance and Surgical History updated.  PAP / HPV 4/23/2013 12/23/2008 4/4/2008   PAP (Historical) NIL NIL NIL     Reviewed and updated as needed this visit by clinical staff   Tobacco  Allergies  Meds  Problems  Med Hx  Surg Hx  Fam Hx        Reviewed and updated as needed this visit by Provider   Tobacco  Allergies  Meds  Problems  Med Hx  Surg Hx  Fam Hx         Family History   Problem Relation Age of Onset     Lipids Maternal Grandmother      Heart Disease Maternal Grandmother      Eye Disorder Maternal Grandmother         macular degeneration     Diabetes Maternal Grandmother      Hypertension Maternal Grandmother      Hyperlipidemia Maternal Grandmother      Depression Mother      Alcohol/Drug Maternal Grandfather         alcohol      "Depression Maternal Grandfather      Colon Cancer Maternal Grandfather      Hypertension Maternal Grandfather      Substance Abuse Maternal Grandfather         alcohol     Unknown/Adopted Father      Substance Abuse Brother         alcohol     Anxiety Disorder Brother      Asthma No family hx of      Breast Cancer No family hx of      Cancer - colorectal No family hx of      Prostate Cancer No family hx of      Cerebrovascular Disease No family hx of      Review of Systems   Constitutional: Negative for chills and fever.   HENT: Negative for congestion, ear pain, hearing loss and sore throat.    Eyes: Negative for pain and visual disturbance.   Respiratory: Negative for cough and shortness of breath.    Cardiovascular: Negative for chest pain, palpitations and peripheral edema.   Gastrointestinal: Negative for abdominal pain, constipation, diarrhea, heartburn, hematochezia and nausea.   Breasts:  Negative for tenderness, breast mass and discharge.   Genitourinary: Negative for dysuria, frequency, genital sores, hematuria, pelvic pain, urgency, vaginal bleeding and vaginal discharge.   Musculoskeletal: Negative for arthralgias, joint swelling and myalgias.   Skin: Negative for rash.   Neurological: Negative for dizziness, weakness, headaches and paresthesias.   Psychiatric/Behavioral: Negative for mood changes. The patient is not nervous/anxious.       OBJECTIVE:   /72 (BP Location: Right arm, Patient Position: Sitting, Cuff Size: Adult Regular)   Pulse 69   Temp 96.9  F (36.1  C) (Tympanic)   Resp 20   Ht 1.683 m (5' 6.25\")   Wt 73.2 kg (161 lb 6.4 oz)   LMP 04/11/2013 (LMP Unknown)   SpO2 97%   BMI 25.85 kg/m    Physical Exam  GENERAL: healthy, alert and no distress  EYES: Eyes grossly normal to inspection, PERRL and conjunctivae and sclerae normal  HENT: ear canals and TM's normal, nose and mouth without ulcers or lesions  NECK: no adenopathy, no asymmetry, masses, or scars and thyroid normal to " palpation  RESP: lungs clear to auscultation - no rales, rhonchi or wheezes  CV: regular rate and rhythm, normal S1 S2, no S3 or S4, no murmur, click or rub, no peripheral edema and peripheral pulses strong  ABDOMEN: soft, nontender, no hepatosplenomegaly, no masses and bowel sounds normal  MS: no gross musculoskeletal defects noted, no edema. Normal appearance and no TTP of R ankle  SKIN: no suspicious lesions or rashes  NEURO: Normal strength and tone, mentation intact and speech normal  PSYCH: mentation appears normal, affect normal/bright    Diagnostic Test Results:  Labs reviewed in Epic    ASSESSMENT/PLAN:   Virginia was seen today for physical and health maintenance.    Diagnoses and all orders for this visit:    Encounter for screening and preventative care  -     REVIEW OF HEALTH MAINTENANCE PROTOCOL ORDERS    Surgical menopause  On estradiol 4 years now, last year had increase in dose to help with better symptom control which it has, however, continues to have hot flashes. I advised against further increase and in fact recommended she start thinking about tapering which she will think about, maybe starting next year. I introduced gabapentin, amitriptyline, for symptoms management, she is willing to try but does not feel the need right now.  -     estradiol (ESTRACE) 2 MG tablet; Take 1 tablet (2 mg) by mouth daily    Encounter for screening mammogram for breast cancer  Is on an earlier screening schedule due to exogenous estrogen  -     *MA Screening Digital Bilateral; Future    R ankle sprain -> actually she has recovered appropriately, reassurance given. Hx suggests no fracture nor complete tear at the time and exam benign today. Anticipatory guidance and gradual return to workouts discussed    Motion sickness, initial encounter  -     ondansetron (ZOFRAN) 4 MG tablet; Take 1 tablet (4 mg) by mouth every 8 hours as needed for nausea    Sea sickness, initial encounter  -     scopolamine (TRANSDERM) 1  MG/3DAYS 72 hr patch; Apply 1 patch to hairless area behind one ear at least 4 hours before travel.  Remove old patch and change every 3 days (72 hours).    Other orders  -     HEPATITIS B VACCINE, ADULT, IM    Patient has been advised of split billing requirements and indicates understanding: Yes    COUNSELING:  Reviewed preventive health counseling, as reflected in patient instructions    She reports that she quit smoking about 14 years ago. Her smoking use included cigarettes. She has a 3.00 pack-year smoking history. She has never used smokeless tobacco.      Paradise Whittaker MD  [facility]

## 2022-12-07 NOTE — NURSING NOTE
Screening Questionnaire for Adult Immunization   Are you sick today? No  Do you have allergies to medications, food, a vaccine component or latex? No  Have you ever had a serious reaction after receiving a vaccination? No  Do you have a long-term health problem with heart, lung, kidney, metabolic disease (e.g. diabetes)disease, asthma,a blood disorder, no spleen, complement component deficiency, a cochlear implant, or a spinal fluid leak?  Are you on long-term aspirin therapy? No  Do you, or does a close family member, have cancer, leukemia, HIV/AIDS, or any other immune system problem? No  In the past 3 months, have you taken medications that affect your immune system, such as cortisone, prednisone, other steroids, or anticancer drugs; drugs for the teatment of rheumatoid arthritis, Crohn's disease, pr psoriasis;  or have you had radiation treatments? No  Have you had a seizure, or a brain or other nervous system problem? No  During the past year, have you received a transfusion of blood or blood products, or been given immune (gamma) globulin or antiviral drug? No  For women: Are you pregnant or is there a chance you could become pregnant during the next month? No  Have you received any vaccinations in the past 4 weeks? No    Immunization questionnaire answers were all negative.        Per orders of Dr. Whittaker, injection of Hep B given by Viki Ortiz MA. Patient instructed to remain in clinic for 20 minutes afterwards, and to report any adverse reaction to me immediately.       Screening performed by Viki Ortiz MA on 12/7/2022 at 11:30 AM.

## 2022-12-08 ENCOUNTER — MYC MEDICAL ADVICE (OUTPATIENT)
Dept: DERMATOLOGY | Facility: CLINIC | Age: 37
End: 2022-12-08

## 2023-01-17 ENCOUNTER — OFFICE VISIT (OUTPATIENT)
Dept: DERMATOLOGY | Facility: CLINIC | Age: 38
End: 2023-01-17
Payer: COMMERCIAL

## 2023-01-17 DIAGNOSIS — D22.9 MULTIPLE BENIGN NEVI: ICD-10-CM

## 2023-01-17 DIAGNOSIS — L81.4 LENTIGO: ICD-10-CM

## 2023-01-17 DIAGNOSIS — L29.9 BRACHIORADIAL PRURITUS: ICD-10-CM

## 2023-01-17 DIAGNOSIS — L82.1 SEBORRHEIC KERATOSIS: Primary | ICD-10-CM

## 2023-01-17 DIAGNOSIS — D18.01 CHERRY ANGIOMA: ICD-10-CM

## 2023-01-17 PROCEDURE — 99203 OFFICE O/P NEW LOW 30 MIN: CPT | Performed by: PHYSICIAN ASSISTANT

## 2023-01-17 RX ORDER — TRETINOIN 0.25 MG/G
CREAM TOPICAL
COMMUNITY
Start: 2022-08-01 | End: 2024-02-09

## 2023-01-17 ASSESSMENT — PAIN SCALES - GENERAL: PAINLEVEL: NO PAIN (0)

## 2023-01-17 NOTE — NURSING NOTE
Chief Complaint   Patient presents with     Skin Check     Baseline, no concerns       There were no vitals filed for this visit.  Wt Readings from Last 1 Encounters:   12/07/22 73.2 kg (161 lb 6.4 oz)       Neha Ashford LPN .................1/17/2023

## 2023-01-17 NOTE — PROGRESS NOTES
Virginia Wood is an extremely pleasant 37 year old year old female patient here today for skin check. She notes history of tanning bed usage as a teenager. She reports that she also has had itchy on right forearm, comes and goes. She notes capsicin cream helps alleviate itch.  Patient has no other skin complaints today.  Remainder of the HPI, Meds, PMH, Allergies, FH, and SH was reviewed in chart.    Pertinent Hx:   No personal history of skin cancer.   Past Medical History:   Diagnosis Date     Depressive disorder     Teen/early twenties     Hormone replacement therapy     post hysterectomy     PONV (postoperative nausea and vomiting)        Past Surgical History:   Procedure Laterality Date     ABDOMEN SURGERY  May 2013 & Sept 2018    Left oophorectomy & hyst in 2013. Right oophorectomy in 2018     CYSTOSCOPY N/A 09/10/2018    Procedure: CYSTOSCOPY;;  Surgeon: Marcus Burks MD;  Location: [facility]     HYSTERECTOMY, PAP NO LONGER INDICATED       LAPAROSCOPIC HYSTERECTOMY TOTAL, BILATERAL SALPINGO-OOPHORECTOMY, COMBINED  05/20/2013    Procedure: COMBINED LAPAROSCOPIC HYSTERECTOMY TOTAL, SALPINGO-OOPHORECTOMY;  Laparoscopic total hysterectomy & bilateral salpingectomy & left oopherectomy & cystoscopy;  Surgeon: Marcus Burks MD;  Location: [facility] OR     LAPAROSCOPIC OOPHORECTOMY Right 09/10/2018    Procedure: LAPAROSCOPIC OOPHORECTOMY;  Laparoscopic Right Oophorectomy & Cystoscopy & Retroperitoneal Exploration & Biopsy of Endometriosis;  Surgeon: Marcus Burks MD;  Location: [facility] OR     [facility] ORAL SURGERY PROCEDURE  06/2007    wisdom teeth        Family History   Problem Relation Age of Onset     Depression Mother      Unknown/Adopted Father      Substance Abuse Brother         alcohol     Anxiety Disorder Brother      Lipids Maternal Grandmother      Heart Disease Maternal Grandmother      Eye Disorder Maternal Grandmother         macular degeneration     Diabetes Maternal Grandmother       Hypertension Maternal Grandmother      Hyperlipidemia Maternal Grandmother      Alcohol/Drug Maternal Grandfather         alcohol     Depression Maternal Grandfather      Colon Cancer Maternal Grandfather      Hypertension Maternal Grandfather      Substance Abuse Maternal Grandfather         alcohol     Skin Cancer Maternal Grandfather      Asthma No family hx of      Breast Cancer No family hx of      Cancer - colorectal No family hx of      Prostate Cancer No family hx of      Cerebrovascular Disease No family hx of        Social History     Socioeconomic History     Marital status:      Spouse name: Not on file     Number of children: 0     Years of education: Not on file     Highest education level: Not on file   Occupational History     Employer: [employer]   Tobacco Use     Smoking status: Former     Packs/day: 1.00     Years: 3.00     Pack years: 3.00     Types: Cigarettes     Quit date: 2008     Years since quittin.9     Smokeless tobacco: Never     Tobacco comments:     occasional smoker   Vaping Use     Vaping Use: Never used   Substance and Sexual Activity     Alcohol use: Not Currently     Drug use: Never     Sexual activity: Yes     Partners: Male     Birth control/protection: None     Comment:  had vasectomy   Other Topics Concern     Parent/sibling w/ CABG, MI or angioplasty before 65F 55M? No   Social History Narrative    [place] maternity  since 2013     Social Determinants of Health     Financial Resource Strain: Not on file   Food Insecurity: Not on file   Transportation Needs: Not on file   Physical Activity: Not on file   Stress: Not on file   Social Connections: Not on file   Intimate Partner Violence: Not on file   Housing Stability: Not on file       Outpatient Encounter Medications as of 2023   Medication Sig Dispense Refill     tretinoin (RETIN-A) 0.025 % external cream        estradiol (ESTRACE) 2 MG tablet Take 1 tablet (2 mg) by mouth  daily 90 tablet 4     ibuprofen (ADVIL/MOTRIN) 200 MG tablet Take 3 tablets (600 mg) by mouth every 6 hours as needed for pain (mild)       Multiple Vitamins-Minerals (DAILY MULTI PO) Take  by mouth.       Multiple Vitamins-Minerals (IMMUNE SUPPORT PO) Immune Support       ondansetron (ZOFRAN) 4 MG tablet Take 1 tablet (4 mg) by mouth every 8 hours as needed for nausea 10 tablet 6     scopolamine (TRANSDERM) 1 MG/3DAYS 72 hr patch Apply 1 patch to hairless area behind one ear at least 4 hours before travel.  Remove old patch and change every 3 days (72 hours). 4 patch 3     TYLENOL 325 MG OR TABS 1 TO 2 TABLETS BY MOUTH EVERY 4 HOURS AS NEEDED FOR PAIN, DO NOT EXCEED 4,000 MG OF ACETAMINOPHEN IN 24 HOURS       No facility-administered encounter medications on file as of 1/17/2023.             O:   NAD, WDWN, Alert & Oriented, Mood & Affect wnl, Vitals stable   Here today alone   LMP 04/11/2013 (LMP Unknown)    General appearance normal   Vitals stable   Alert, oriented and in no acute distress     Brown macules on face   No rash on right forearm   Stuck on papules and brown macules on trunk and ext   Red papules on trunk  Brown papules and macules with regular pigment network and borders on torso and extremities      The remainder of skin exam is normal       Eyes: Conjunctivae/lids:Normal     ENT: Lips: normal    MSK:Normal    Cardiovascular: peripheral edema none    Pulm: Breathing Normal    Neuro/Psych: Orientation:Alert and Orientedx3 ; Mood/Affect:normal   A/P:  1. Brachioradial pruritis  Informational handout given.   Continue capsicin cream since helpful.   2.  Seborrheic keratosis, lentigo, angioma, benign nevi  Discussed IPL for lentigo on face. Can send in topical numbing if wanted.   It was a pleasure speaking to Virginia Wood today.  BENIGN LESIONS DISCUSSED WITH PATIENT:  I discussed the specifics of tumor, prognosis, and genetics of benign lesions.  I explained that treatment of these lesions  would be purely cosmetic and not medically neccessary.  I discussed with patient different removal options including excision, cautery and /or laser.      Nature and genetics of benign skin lesions dicussed with patient.  Signs and Symptoms of skin cancer discussed with patient.  ABCDEs of melanoma reviewed with patient.  Patient encouraged to perform monthly skin exams.  UV precautions reviewed with patient.  Risks of non-melanoma skin cancer discussed with patient   Return to clinic as needed.

## 2023-01-17 NOTE — LETTER
1/17/2023         RE: Virginia Wood  [address]  [facility] [address]        Dear Colleague,    Thank you for referring your patient, Virginia Wood, to the [facility]. Please see a copy of my visit note below.    Virginia Wood is an extremely pleasant 37 year old year old female patient here today for skin check. She notes history of tanning bed usage as a teenager. She reports that she also has had itchy on right forearm, comes and goes. She notes capsicin cream helps alleviate itch.  Patient has no other skin complaints today.  Remainder of the HPI, Meds, PMH, Allergies, FH, and SH was reviewed in chart.    Pertinent Hx:   No personal history of skin cancer.   Past Medical History:   Diagnosis Date     Depressive disorder     Teen/early twenties     Hormone replacement therapy     post hysterectomy     PONV (postoperative nausea and vomiting)        Past Surgical History:   Procedure Laterality Date     ABDOMEN SURGERY  May 2013 & Sept 2018    Left oophorectomy & hyst in 2013. Right oophorectomy in 2018     CYSTOSCOPY N/A 09/10/2018    Procedure: CYSTOSCOPY;;  Surgeon: Marcus Burks MD;  Location: [facility]     HYSTERECTOMY, PAP NO LONGER INDICATED       LAPAROSCOPIC HYSTERECTOMY TOTAL, BILATERAL SALPINGO-OOPHORECTOMY, COMBINED  05/20/2013    Procedure: COMBINED LAPAROSCOPIC HYSTERECTOMY TOTAL, SALPINGO-OOPHORECTOMY;  Laparoscopic total hysterectomy & bilateral salpingectomy & left oopherectomy & cystoscopy;  Surgeon: Marcus Burks MD;  Location: [facility]     LAPAROSCOPIC OOPHORECTOMY Right 09/10/2018    Procedure: LAPAROSCOPIC OOPHORECTOMY;  Laparoscopic Right Oophorectomy & Cystoscopy & Retroperitoneal Exploration & Biopsy of Endometriosis;  Surgeon: Marcus Burks MD;  Location: [facility] OR     [facility] ORAL SURGERY PROCEDURE  06/2007    wisdom teeth        Family History   Problem Relation Age of Onset     Depression Mother       Unknown/Adopted Father      Substance Abuse Brother         alcohol     Anxiety Disorder Brother      Lipids Maternal Grandmother      Heart Disease Maternal Grandmother      Eye Disorder Maternal Grandmother         macular degeneration     Diabetes Maternal Grandmother      Hypertension Maternal Grandmother      Hyperlipidemia Maternal Grandmother      Alcohol/Drug Maternal Grandfather         alcohol     Depression Maternal Grandfather      Colon Cancer Maternal Grandfather      Hypertension Maternal Grandfather      Substance Abuse Maternal Grandfather         alcohol     Skin Cancer Maternal Grandfather      Asthma No family hx of      Breast Cancer No family hx of      Cancer - colorectal No family hx of      Prostate Cancer No family hx of      Cerebrovascular Disease No family hx of        Social History     Socioeconomic History     Marital status:      Spouse name: Not on file     Number of children: 0     Years of education: Not on file     Highest education level: Not on file   Occupational History     Employer: [employer]   Tobacco Use     Smoking status: Former     Packs/day: 1.00     Years: 3.00     Pack years: 3.00     Types: Cigarettes     Quit date: 2008     Years since quittin.9     Smokeless tobacco: Never     Tobacco comments:     occasional smoker   Vaping Use     Vaping Use: Never used   Substance and Sexual Activity     Alcohol use: Not Currently     Drug use: Never     Sexual activity: Yes     Partners: Male     Birth control/protection: None     Comment:  had vasectomy   Other Topics Concern     Parent/sibling w/ CABG, MI or angioplasty before 65F 55M? No   Social History Narrative    [place] maternity  since 2013     Social Determinants of Health     Financial Resource Strain: Not on file   Food Insecurity: Not on file   Transportation Needs: Not on file   Physical Activity: Not on file   Stress: Not on file   Social Connections: Not on file    Intimate Partner Violence: Not on file   Housing Stability: Not on file       Outpatient Encounter Medications as of 1/17/2023   Medication Sig Dispense Refill     tretinoin (RETIN-A) 0.025 % external cream        estradiol (ESTRACE) 2 MG tablet Take 1 tablet (2 mg) by mouth daily 90 tablet 4     ibuprofen (ADVIL/MOTRIN) 200 MG tablet Take 3 tablets (600 mg) by mouth every 6 hours as needed for pain (mild)       Multiple Vitamins-Minerals (DAILY MULTI PO) Take  by mouth.       Multiple Vitamins-Minerals (IMMUNE SUPPORT PO) Immune Support       ondansetron (ZOFRAN) 4 MG tablet Take 1 tablet (4 mg) by mouth every 8 hours as needed for nausea 10 tablet 6     scopolamine (TRANSDERM) 1 MG/3DAYS 72 hr patch Apply 1 patch to hairless area behind one ear at least 4 hours before travel.  Remove old patch and change every 3 days (72 hours). 4 patch 3     TYLENOL 325 MG OR TABS 1 TO 2 TABLETS BY MOUTH EVERY 4 HOURS AS NEEDED FOR PAIN, DO NOT EXCEED 4,000 MG OF ACETAMINOPHEN IN 24 HOURS       No facility-administered encounter medications on file as of 1/17/2023.             O:   NAD, WDWN, Alert & Oriented, Mood & Affect wnl, Vitals stable   Here today alone   LMP 04/11/2013 (LMP Unknown)    General appearance normal   Vitals stable   Alert, oriented and in no acute distress     Brown macules on face   No rash on right forearm   Stuck on papules and brown macules on trunk and ext   Red papules on trunk  Brown papules and macules with regular pigment network and borders on torso and extremities      The remainder of skin exam is normal       Eyes: Conjunctivae/lids:Normal     ENT: Lips: normal    MSK:Normal    Cardiovascular: peripheral edema none    Pulm: Breathing Normal    Neuro/Psych: Orientation:Alert and Orientedx3 ; Mood/Affect:normal   A/P:  1. Brachioradial pruritis  Informational handout given.   Continue capsicin cream since helpful.   2.  Seborrheic keratosis, lentigo, angioma, benign nevi  Discussed IPL for  lentigo on face. Can send in topical numbing if wanted.   It was a pleasure speaking to Virginia Wood today.  BENIGN LESIONS DISCUSSED WITH PATIENT:  I discussed the specifics of tumor, prognosis, and genetics of benign lesions.  I explained that treatment of these lesions would be purely cosmetic and not medically neccessary.  I discussed with patient different removal options including excision, cautery and /or laser.      Nature and genetics of benign skin lesions dicussed with patient.  Signs and Symptoms of skin cancer discussed with patient.  ABCDEs of melanoma reviewed with patient.  Patient encouraged to perform monthly skin exams.  UV precautions reviewed with patient.  Risks of non-melanoma skin cancer discussed with patient   Return to clinic as needed.         Again, thank you for allowing me to participate in the care of your patient.        Sincerely,        Claudia Escobedo PA-C

## 2023-11-08 ENCOUNTER — PATIENT OUTREACH (OUTPATIENT)
Dept: CARE COORDINATION | Facility: CLINIC | Age: 38
End: 2023-11-08
Payer: COMMERCIAL

## 2023-11-22 ENCOUNTER — PATIENT OUTREACH (OUTPATIENT)
Dept: CARE COORDINATION | Facility: CLINIC | Age: 38
End: 2023-11-22
Payer: COMMERCIAL

## 2024-01-11 ENCOUNTER — TELEPHONE (OUTPATIENT)
Dept: TRANSPLANT | Facility: CLINIC | Age: 39
End: 2024-01-11

## 2024-01-12 ENCOUNTER — DOCUMENTATION ONLY (OUTPATIENT)
Dept: TRANSPLANT | Facility: CLINIC | Age: 39
End: 2024-01-12
Payer: COMMERCIAL

## 2024-01-15 ENCOUNTER — TELEPHONE (OUTPATIENT)
Dept: TRANSPLANT | Facility: CLINIC | Age: 39
End: 2024-01-15
Payer: COMMERCIAL

## 2024-01-15 ENCOUNTER — DOCUMENTATION ONLY (OUTPATIENT)
Dept: TRANSPLANT | Facility: CLINIC | Age: 39
End: 2024-01-15
Payer: COMMERCIAL

## 2024-01-15 NOTE — TELEPHONE ENCOUNTER
Initial Independent Living Donor Advocate contact made with potential donor today.  I introduced myself and my role during the donation process, including:   ANAT ROLE   The federal government requires that all licensed transplant centers provide the living donor with an Independent Living Donor Advocate (ANAT).  I do not meet recipients or attend meetings that discuss their care or decision to transplant them. My role is separate to avoid any conflict of interest.  My role is to ensure:  1) your rights are protected;  2) you get all the information you need from the transplant team to make a fully informed decision whether to donate;   3) that living donation is in your best interest.   4) that you have the right to decide NOT to go forward with living donation at any time during this process.  I am available to you throughout the workup, during surgery phase and follow-up at home.     WORKUP & PRIVACY   Your identity and workup are not shared with the recipient at any time.   The recipient's insurance covers the medical expenses related to the donor evaluation and surgery.  However, it is important that you carry your own health insurance to address any medical issues that are found and are NOT related to living donation.  Additionally, age appropriate cancer screening (I.e. mammograms,  colonoscopies, etc) is required and would be through your insurance.  There is a psychosocial and medical donor workup that consists of testing to determine if you are healthy enough to donate. Workup tests include tissue typing/genetics, many blood draws, urine collection/ (kidney function testing), chest x-ray, EKG/other heart testing, CT scan. Age appropriate cancer screening is required and would be through your insurance. As you complete each step then you may move on to the next.  Workup can take as little or as long as you need and you can stop the process at any time. Transplant is a treatment option, not a cure. A kidney  from a living kidney donor can last 12-14 years.  Other treatment options are  donation and two types of dialysis.   This is major surgery and your estimated hospital stay is approximately 1-2 nights.  After surgery, there are driving and lifting restrictions - no driving for two weeks and no lifting over ten pounds for 8 weeks.  Donors are routinely off from work for 4 - 6 weeks after surgery, and potentially longer if they have a physical job.     If you anticipate lost wages due to donation, donor wage reimbursement options may be available to you and will be reviewed with you during the evaluation process. Donor Shield and NLDAC explained.  We reviewed the importance of completing follow-up labs and surveys at six months, 1 year and 2 years after donation to monitor kidney health and the impact donation has had on their life post donation.        QUESTIONS    Have you received the Welcome e-mail that includes copies of the informed consent, financial letter, information on donor shield and NLDAC from the transplant department? Yes.    Have you discussed with anyone your potential decision to donate?   Yes.    Is anyone pressuring or coercing you to donate? No.    Have you discussed any financial arrangements with recipient around donating a kidney? No.    Are you aware that you can confidentially opt out at any time, up to and including day of donation? Yes.    At this time, would you like to proceed with the medical evaluation to see if you can be a kidney donor?  Yes.    If yes, I will make an appointment for your donor coordinator to reach out to you with next steps.     Contact information for ANAT's was provided Yes.    Laverne Mckinney- [phone]  Kelly Stearns-  [phone]    Confirmed that Crystal reviewed Informed consent document and all questions answered.  Reviewed that they will receive Docusign to obtain electronic signature for the following: Informed consent, SRTR data, WEI for medical  information, Auth for Electronic communication, Kidney for Life and will need their signed consent back before proceeding with evaluation.     Time frame for donation: open  Paired exchange was introduced  Yes.      MyChart was initiated  Yes.  CareEverywhere was initiated No.    ANAT NOTES: Virginia wants to donate a kidney to a friend. Virginia lives in [place] with  and 2 kids (14 and 15). Works as a  and is self employed. Scheduled to talk to Sari Hearn on 1/22 at 4pm.     Kelly Stearns, [facility], CCTSW   Independent Living Donor Advocate  [facility], [facility]  Direct: [phone]  E-Mail: [email or web address]        Duration of call 30 minutes

## 2024-01-22 ENCOUNTER — TELEPHONE (OUTPATIENT)
Dept: TRANSPLANT | Facility: CLINIC | Age: 39
End: 2024-01-22
Payer: COMMERCIAL

## 2024-01-22 NOTE — TELEPHONE ENCOUNTER
Contacted Virginia Wood to introduce myself and my role, review of medical/surgical/family history and next steps.     Virginia Wood  is aware She can stop donor evaluation at any time.    Regular blood donor? No Last blood donation date N/A (Notified donor to avoid blood donation at this time to get accurate blood counts if going through evaluation)     Virginia Wood is a 38 year old female  ABO O that would like to learn more about kidney donation. Open to paired exchange: yes     Concerns from medical/surgical/family history: no    Current medications and NSAID use:   Estradiol daily     Allergies reviewed: yes    Legal issues w/ law enforcement: no    Reviewed any history of travel in endemic areas: [place], yes  Strongyloides- [place], [place] and [place].  Trypanosoma cruzi ([place])- [place]  West Nile Virus- [place], [place], [place], [place] and [place]. (Included in CAMELIA testing prior to donation)    Per our Phase 1 algorithm, does not meet criteria to do preliminary testing. Social work screening no.      Verified that potential donor was provided the informed consent DocuSign and they are comfortable moving forward to living donor evaluation.    Reviewed evaluation testing: Iohexol, Lab work, CXR, EKG, Provider visits and functions, CT Angiogram.     Reviewed operations of selection committee and angio review meetings and the need for multidisciplinary input. Post-donation requirements include post-op appointment with your surgeon at 2 weeks after surgery, 6 week, 6 month, 1 year and 2 year lab tests.     Reviewed NKR listing and transfer of care to KPD team if approved. Provided Virginia with NKR website to review.     Briefly went over options if approved of NDD and voucher donation.     Virginia would like to proceed with next steps: martha     Encouraged sign up for [employer] and reviewed importance of watching teaching videos prior to evaluation.     Verified  recipient status if not NDD.    Donor timeline: TBD     Will send orders to scheduling team to set up for eval testing.

## 2024-01-23 DIAGNOSIS — Z00.5 TRANSPLANT DONOR EVALUATION: Primary | ICD-10-CM

## 2024-01-23 RX ORDER — ALBUTEROL SULFATE 0.83 MG/ML
2.5 SOLUTION RESPIRATORY (INHALATION)
Status: CANCELLED | OUTPATIENT
Start: 2024-02-09

## 2024-01-23 RX ORDER — EPINEPHRINE 1 MG/ML
0.3 INJECTION, SOLUTION, CONCENTRATE INTRAVENOUS EVERY 5 MIN PRN
Status: CANCELLED | OUTPATIENT
Start: 2024-02-09

## 2024-01-23 RX ORDER — MEPERIDINE HYDROCHLORIDE 25 MG/ML
25 INJECTION INTRAMUSCULAR; INTRAVENOUS; SUBCUTANEOUS EVERY 30 MIN PRN
Status: CANCELLED | OUTPATIENT
Start: 2024-02-09

## 2024-01-23 RX ORDER — DIPHENHYDRAMINE HYDROCHLORIDE 50 MG/ML
50 INJECTION INTRAMUSCULAR; INTRAVENOUS
Status: CANCELLED
Start: 2024-02-09

## 2024-01-23 RX ORDER — ALBUTEROL SULFATE 90 UG/1
1-2 AEROSOL, METERED RESPIRATORY (INHALATION)
Status: CANCELLED
Start: 2024-02-09

## 2024-01-28 ENCOUNTER — HEALTH MAINTENANCE LETTER (OUTPATIENT)
Age: 39
End: 2024-01-28

## 2024-02-08 LAB
ABO/RH(D): NORMAL
ANTIBODY SCREEN: NEGATIVE
SPECIMEN EXPIRATION DATE: NORMAL

## 2024-02-09 ENCOUNTER — OFFICE VISIT (OUTPATIENT)
Dept: INFUSION THERAPY | Facility: CLINIC | Age: 39
End: 2024-02-09
Attending: INTERNAL MEDICINE
Payer: COMMERCIAL

## 2024-02-09 ENCOUNTER — ALLIED HEALTH/NURSE VISIT (OUTPATIENT)
Dept: TRANSPLANT | Facility: CLINIC | Age: 39
End: 2024-02-09
Attending: INTERNAL MEDICINE
Payer: COMMERCIAL

## 2024-02-09 ENCOUNTER — ANCILLARY PROCEDURE (OUTPATIENT)
Dept: CT IMAGING | Facility: CLINIC | Age: 39
End: 2024-02-09
Attending: INTERNAL MEDICINE
Payer: COMMERCIAL

## 2024-02-09 ENCOUNTER — LAB (OUTPATIENT)
Dept: LAB | Facility: CLINIC | Age: 39
End: 2024-02-09
Attending: INTERNAL MEDICINE

## 2024-02-09 ENCOUNTER — ALLIED HEALTH/NURSE VISIT (OUTPATIENT)
Dept: TRANSPLANT | Facility: CLINIC | Age: 39
End: 2024-02-09
Attending: INTERNAL MEDICINE

## 2024-02-09 ENCOUNTER — LAB (OUTPATIENT)
Dept: LAB | Facility: CLINIC | Age: 39
End: 2024-02-09
Attending: INTERNAL MEDICINE
Payer: COMMERCIAL

## 2024-02-09 ENCOUNTER — ANCILLARY PROCEDURE (OUTPATIENT)
Dept: GENERAL RADIOLOGY | Facility: CLINIC | Age: 39
End: 2024-02-09
Attending: INTERNAL MEDICINE
Payer: COMMERCIAL

## 2024-02-09 VITALS
HEART RATE: 55 BPM | DIASTOLIC BLOOD PRESSURE: 67 MMHG | BODY MASS INDEX: 26.36 KG/M2 | RESPIRATION RATE: 16 BRPM | WEIGHT: 164 LBS | HEIGHT: 66 IN | SYSTOLIC BLOOD PRESSURE: 103 MMHG | OXYGEN SATURATION: 99 %

## 2024-02-09 VITALS
WEIGHT: 164.2 LBS | TEMPERATURE: 97.7 F | OXYGEN SATURATION: 100 % | HEART RATE: 62 BPM | DIASTOLIC BLOOD PRESSURE: 69 MMHG | SYSTOLIC BLOOD PRESSURE: 111 MMHG | RESPIRATION RATE: 16 BRPM | BODY MASS INDEX: 26.3 KG/M2

## 2024-02-09 DIAGNOSIS — Z00.5 TRANSPLANT DONOR EVALUATION: ICD-10-CM

## 2024-02-09 DIAGNOSIS — Z00.5 TRANSPLANT DONOR EVALUATION: Primary | ICD-10-CM

## 2024-02-09 LAB
ABO/RH(D): NORMAL
ALBUMIN MFR UR ELPH: <6 MG/DL
ALBUMIN SERPL BCG-MCNC: 4.6 G/DL (ref 3.5–5.2)
ALBUMIN UR-MCNC: NEGATIVE MG/DL
ALP SERPL-CCNC: 58 U/L (ref 40–150)
ALT SERPL W P-5'-P-CCNC: 37 U/L (ref 0–50)
ANION GAP SERPL CALCULATED.3IONS-SCNC: 10 MMOL/L (ref 7–15)
APPEARANCE UR: CLEAR
APTT PPP: 28 SECONDS (ref 22–38)
AST SERPL W P-5'-P-CCNC: 31 U/L (ref 0–45)
BACTERIA #/AREA URNS HPF: ABNORMAL /HPF
BILIRUB SERPL-MCNC: 0.5 MG/DL
BILIRUB UR QL STRIP: NEGATIVE
BUN SERPL-MCNC: 12.8 MG/DL (ref 6–20)
CALCIUM SERPL-MCNC: 9.8 MG/DL (ref 8.6–10)
CHLORIDE SERPL-SCNC: 104 MMOL/L (ref 98–107)
CHOLEST SERPL-MCNC: 154 MG/DL
CMV IGG SERPL IA-ACNC: <0.2 U/ML
CMV IGG SERPL IA-ACNC: NORMAL
COLOR UR AUTO: ABNORMAL
CREAT SERPL-MCNC: 0.75 MG/DL (ref 0.51–0.95)
CREAT UR-MCNC: 17.5 MG/DL
CREAT UR-MCNC: 18 MG/DL
DEPRECATED HCO3 PLAS-SCNC: 26 MMOL/L (ref 22–29)
EBV VCA IGG SER IA-ACNC: 597 U/ML
EBV VCA IGG SER IA-ACNC: POSITIVE
EBV VCA IGM SER IA-ACNC: <10 U/ML
EBV VCA IGM SER IA-ACNC: NORMAL
EGFRCR SERPLBLD CKD-EPI 2021: >90 ML/MIN/1.73M2
ERYTHROCYTE [DISTWIDTH] IN BLOOD BY AUTOMATED COUNT: 12.2 % (ref 10–15)
FASTING STATUS PATIENT QL REPORTED: YES
GLUCOSE SERPL-MCNC: 86 MG/DL (ref 70–99)
GLUCOSE UR STRIP-MCNC: NEGATIVE MG/DL
HBA1C MFR BLD: 5.4 %
HBV CORE AB SERPL QL IA: NONREACTIVE
HBV SURFACE AB SERPL IA-ACNC: 4.07 M[IU]/ML
HBV SURFACE AB SERPL IA-ACNC: NONREACTIVE M[IU]/ML
HBV SURFACE AG SERPL QL IA: NONREACTIVE
HCT VFR BLD AUTO: 40.7 % (ref 35–47)
HCV AB SERPL QL IA: NONREACTIVE
HDLC SERPL-MCNC: 80 MG/DL
HGB BLD-MCNC: 12.9 G/DL (ref 11.7–15.7)
HGB UR QL STRIP: NEGATIVE
HIV 1+2 AB+HIV1 P24 AG SERPL QL IA: NONREACTIVE
INR PPP: 0.95 (ref 0.85–1.15)
KETONES UR STRIP-MCNC: NEGATIVE MG/DL
LDLC SERPL CALC-MCNC: 64 MG/DL
LEUKOCYTE ESTERASE UR QL STRIP: NEGATIVE
MCH RBC QN AUTO: 26.7 PG (ref 26.5–33)
MCHC RBC AUTO-ENTMCNC: 31.7 G/DL (ref 31.5–36.5)
MCV RBC AUTO: 84 FL (ref 78–100)
MICROALBUMIN UR-MCNC: <12 MG/L
MICROALBUMIN/CREAT UR: NORMAL MG/G{CREAT}
NITRATE UR QL: NEGATIVE
NONHDLC SERPL-MCNC: 74 MG/DL
PH UR STRIP: 6.5 [PH] (ref 5–7)
PHOSPHATE SERPL-MCNC: 3.5 MG/DL (ref 2.5–4.5)
PLATELET # BLD AUTO: 262 10E3/UL (ref 150–450)
POTASSIUM SERPL-SCNC: 3.5 MMOL/L (ref 3.4–5.3)
PROT SERPL-MCNC: 7.9 G/DL (ref 6.4–8.3)
PROT/CREAT 24H UR: NORMAL MG/G{CREAT}
RBC # BLD AUTO: 4.83 10E6/UL (ref 3.8–5.2)
RBC URINE: 0 /HPF
SODIUM SERPL-SCNC: 140 MMOL/L (ref 135–145)
SP GR UR STRIP: 1 (ref 1–1.03)
SPECIMEN EXPIRATION DATE: NORMAL
SQUAMOUS EPITHELIAL: 1 /HPF
T PALLIDUM AB SER QL: NONREACTIVE
TRIGL SERPL-MCNC: 52 MG/DL
URATE SERPL-MCNC: 3.8 MG/DL (ref 2.4–5.7)
UROBILINOGEN UR STRIP-MCNC: NORMAL MG/DL
WBC # BLD AUTO: 3.7 10E3/UL (ref 4–11)
WBC URINE: 1 /HPF

## 2024-02-09 PROCEDURE — 86780 TREPONEMA PALLIDUM: CPT

## 2024-02-09 PROCEDURE — 74175 CTA ABDOMEN W/CONTRAST: CPT | Mod: GC | Performed by: RADIOLOGY

## 2024-02-09 PROCEDURE — 81001 URINALYSIS AUTO W/SCOPE: CPT

## 2024-02-09 PROCEDURE — 71046 X-RAY EXAM CHEST 2 VIEWS: CPT | Mod: GC | Performed by: RADIOLOGY

## 2024-02-09 PROCEDURE — 36415 COLL VENOUS BLD VENIPUNCTURE: CPT | Performed by: SURGERY

## 2024-02-09 PROCEDURE — 93000 ELECTROCARDIOGRAM COMPLETE: CPT | Performed by: INTERNAL MEDICINE

## 2024-02-09 PROCEDURE — 36415 COLL VENOUS BLD VENIPUNCTURE: CPT

## 2024-02-09 PROCEDURE — 87340 HEPATITIS B SURFACE AG IA: CPT

## 2024-02-09 PROCEDURE — 86706 HEP B SURFACE ANTIBODY: CPT

## 2024-02-09 PROCEDURE — 82040 ASSAY OF SERUM ALBUMIN: CPT

## 2024-02-09 PROCEDURE — 84100 ASSAY OF PHOSPHORUS: CPT

## 2024-02-09 PROCEDURE — 86788 WEST NILE VIRUS AB IGM: CPT

## 2024-02-09 PROCEDURE — 86803 HEPATITIS C AB TEST: CPT

## 2024-02-09 PROCEDURE — 86682 HELMINTH ANTIBODY: CPT

## 2024-02-09 PROCEDURE — 84156 ASSAY OF PROTEIN URINE: CPT

## 2024-02-09 PROCEDURE — 82542 COL CHROMOTOGRAPHY QUAL/QUAN: CPT | Performed by: SURGERY

## 2024-02-09 PROCEDURE — 99205 OFFICE O/P NEW HI 60 MIN: CPT | Performed by: INTERNAL MEDICINE

## 2024-02-09 PROCEDURE — 86753 PROTOZOA ANTIBODY NOS: CPT

## 2024-02-09 PROCEDURE — 85730 THROMBOPLASTIN TIME PARTIAL: CPT

## 2024-02-09 PROCEDURE — 84550 ASSAY OF BLOOD/URIC ACID: CPT

## 2024-02-09 PROCEDURE — 255N000002 HC RX 255 OP 636: Performed by: INTERNAL MEDICINE

## 2024-02-09 PROCEDURE — 99207 PR NO CHARGE COORDINATED CARE PS: CPT

## 2024-02-09 PROCEDURE — 80061 LIPID PANEL: CPT

## 2024-02-09 PROCEDURE — 86481 TB AG RESPONSE T-CELL SUSP: CPT

## 2024-02-09 PROCEDURE — 86665 EPSTEIN-BARR CAPSID VCA: CPT

## 2024-02-09 PROCEDURE — 81378 HLA I & II TYPING HR: CPT

## 2024-02-09 PROCEDURE — 86644 CMV ANTIBODY: CPT

## 2024-02-09 PROCEDURE — 86900 BLOOD TYPING SEROLOGIC ABO: CPT

## 2024-02-09 PROCEDURE — 99204 OFFICE O/P NEW MOD 45 MIN: CPT | Performed by: SURGERY

## 2024-02-09 PROCEDURE — 82043 UR ALBUMIN QUANTITATIVE: CPT

## 2024-02-09 PROCEDURE — 83036 HEMOGLOBIN GLYCOSYLATED A1C: CPT

## 2024-02-09 PROCEDURE — 85610 PROTHROMBIN TIME: CPT

## 2024-02-09 PROCEDURE — 86704 HEP B CORE ANTIBODY TOTAL: CPT

## 2024-02-09 PROCEDURE — 99213 OFFICE O/P EST LOW 20 MIN: CPT | Performed by: SURGERY

## 2024-02-09 PROCEDURE — 85027 COMPLETE CBC AUTOMATED: CPT

## 2024-02-09 RX ORDER — ALBUTEROL SULFATE 0.83 MG/ML
2.5 SOLUTION RESPIRATORY (INHALATION)
Status: CANCELLED | OUTPATIENT
Start: 2024-02-09

## 2024-02-09 RX ORDER — ALBUTEROL SULFATE 90 UG/1
1-2 AEROSOL, METERED RESPIRATORY (INHALATION)
Status: CANCELLED
Start: 2024-02-09

## 2024-02-09 RX ORDER — DIPHENHYDRAMINE HYDROCHLORIDE 50 MG/ML
50 INJECTION INTRAMUSCULAR; INTRAVENOUS
Status: CANCELLED
Start: 2024-02-09

## 2024-02-09 RX ORDER — MEPERIDINE HYDROCHLORIDE 25 MG/ML
25 INJECTION INTRAMUSCULAR; INTRAVENOUS; SUBCUTANEOUS EVERY 30 MIN PRN
Status: CANCELLED | OUTPATIENT
Start: 2024-02-09

## 2024-02-09 RX ORDER — IOPAMIDOL 755 MG/ML
90 INJECTION, SOLUTION INTRAVASCULAR ONCE
Status: COMPLETED | OUTPATIENT
Start: 2024-02-09 | End: 2024-02-09

## 2024-02-09 RX ORDER — METHYLPREDNISOLONE SODIUM SUCCINATE 125 MG/2ML
125 INJECTION, POWDER, LYOPHILIZED, FOR SOLUTION INTRAMUSCULAR; INTRAVENOUS
Status: CANCELLED
Start: 2024-02-09

## 2024-02-09 RX ORDER — ESTRADIOL 2 MG/1
1 TABLET ORAL DAILY
COMMUNITY
Start: 2024-02-09 | End: 2024-02-15

## 2024-02-09 RX ORDER — EPINEPHRINE 1 MG/ML
0.3 INJECTION, SOLUTION INTRAMUSCULAR; SUBCUTANEOUS EVERY 5 MIN PRN
Status: CANCELLED | OUTPATIENT
Start: 2024-02-09

## 2024-02-09 RX ADMIN — IOHEXOL 4 ML: 350 INJECTION, SOLUTION INTRAVENOUS at 07:26

## 2024-02-09 RX ADMIN — IOPAMIDOL 90 ML: 755 INJECTION, SOLUTION INTRAVASCULAR at 12:04

## 2024-02-09 ASSESSMENT — PAIN SCALES - GENERAL: PAINLEVEL: NO PAIN (0)

## 2024-02-09 NOTE — PROGRESS NOTES
Donor Iohexol test    Virginia Wood presents today to [facility] for a Donor Iohexol test.      Progress note:  ID verified by name and .     The following information was verified with the patient:  Female Patients is there any possibility of being pregnant No  Is there a history of allergy (skin rash, swelling, ect) to:   A.  Iodine (except skin reactions to betadine): No   B. Intravenous radio-contrast agents: No   C. Seafood No     present during visit today: Not Applicable.    R.N. provided patient with educational handout regarding timed test. Yes    PIV placed in right AC and Iohexol administered over 2 minutes.  Positive blood return verified before and after injection. PIV removed.  20 gauge P[facility] draws and CT this afternoon.    Medication administered:  Iohexol (Omnipaque 300mg iodine/ml concentration) 5 mls.    Start time: 730  Stop time: 07      Evaluation nurse in transplant to draw labs at 2 and 4 hours post iohexol administration.  Patient given a slip with the times to get labs drawn and verbalized understanding of the plan.    Patient tolerated the procedure:  Yes    After the infusion patient was discharged to the next appointment.    Administrations This Visit       iohexol (OMNIPAQUE) 350 MG/ML injectable solution 4 mL       Admin Date  2024 Action  $Given Dose  4 mL Route  Intravenous Documented By  Nury Meyers, RN                    Nury Meyers, RN

## 2024-02-09 NOTE — LETTER
2/9/2024         RE: Virginia Wood  [address] [facility] [id number]        Dear Colleague,    Thank you for referring your patient, Virginia Wood, to the [facility]. Please see a copy of my visit note below.    TRANSPLANT NEPHROLOGY DONOR EVALUATION    Assessment and Plan:  # Prospective Kidney Transplant Donor: Patient with one issue that needs to be addressed prior to donation. Patient's blood pressure is acceptable at this visit, kidney function appears to be acceptable with Iohexol pending, and urinalysis is bland.    # Low White Blood Cell Count: Patient with mildly low WBC at 3.7.  Previous WBC were normal, although last checked many years early.  She did have a recent viral infection, which might be contributing.   - Recommend repeating WBC.    Discussed the risks of donating a kidney, including the surgical risk and the possible risks of living with one kidney.    Education about expected post-donation kidney function and how chronic kidney disease (CKD) and end stage kidney disease (ESKD) might potentially impact the donor in the future, include, but not limited to:       - On average, donors will have 25-35% permanent loss of kidney function at donation.       - Baseline risk of ESKD may slightly exceed that of members of the general        population with the same demographic profile.       - Donor risks must be interpreted in light of known epidemiology of both CKD or         ESKD, such as that CKD generally develops in midlife (40-50 years old) and ESKD         generally develops after age 60.       - Medical evaluation of young potential donors cannot predict lifetime risk of CKD or         ESKD.       - Donors may be at higher risk for CKD if they sustain damage to the remaining         kidney.       - Development of CKD and progression of ESKD may be more rapid with only 1         kidney.       - Some type of kidney replacement therapy,  either kidney transplant or dialysis, is         required when reaching ESKD.    Potential medical or surgical risks include, but not limited to:       - Death.       - Scars, pain, fatigue, and other consequences typical of any surgical procedure.       - Decreased kidney function.       - Abdominal or bowel symptoms, such as bloating and nausea, and developing         bowel obstruction.       - Kidney failure (ESKD) and the need for a kidney transplant or dialysis for the donor.       - Impact of obesity, hypertension, or other donor-specific medical conditions on         morbidity and mortality of the potential donor.    Patients overall evaluation will be discussed with the transplant team and a final recommendation on the patients' suitability to be a kidney transplant donor will be made at that time.    Consult:  Virginia Wood was seen in consultation at the request of Dr. Danae Sanders for evaluation as a potential kidney transplant donor.    Reason for Visit:  Virginia Wood is a 38 year old female who presents for a kidney donor evaluation.  Patient would like to donate to Josesito Gates, a friend of hers .    Present Condition and Donor-Related Medical History:    Energy level is good and has been normal.  She is active and does get some exercise.  Denies any chest pain or shortness of breath with exertion.  Appetite is good and no recent weight change.  No nausea, vomiting or diarrhea.  No fever, sweats or chills.  No leg swelling.  No pain or burning with urination.    She does have a h/o dysmenorrhea with endometriosis, s/p hysterectomy, bilateral salpingectomy and left oophorectomy.  In addition, patient developed painful right ovarian cyst and is s/p right oophorectomy.  During that surgery, the report talks about pealing the right ovary off the right ureter.         Kidney Disease Hx:       h/o Kidney Problems: No   Family h/o Genetic Kidney Disease: No       h/o Hypertension:  No      Usual Blood Pressure:  100/60s       h/o Protein in Urine: No    h/o Blood in Urine: No       h/o Kidney Stones: No     h/o Kidney Injury: No       h/o Recurrent UTI: No   h/o Genitourinary Problems: Yes: h/o dysmenorrhea, endometriosis and ovarian cyst, s/p surgery for these       h/o Chronic NSAID Use: No         Other Medical Hx:       h/o Diabetes: No             h/o Gastrointestinal, Pancreas or Liver Problems: No       h/o Lung or Heart Problems: No       h/o Hematologic Problems: No  h/o Bleeding or Clotting Problems: No       h/o Cancer: No       h/o Infection Problems: No       H/o Gestational DM: No      H/o Gestational HTN: Yes     H/o Preeclampsia: No         Skin Cancer Risk:       h/o more than 50 moles: No       h/o extensive sun exposure: No       h/o melanoma: No       Family h/o melanoma: No         Mental Health Assessment:       h/o Depression: Yes: When she was young, but no issues lately.       h/o Psychiatric Illness: No       h/o Suicidal Attempt(s): No    COVID Status:  Vaccination Up To Date: No  H/o COVID Infection: No     Review Of Systems:   A comprehensive review of systems was obtained and negative, except as noted in the HPI or PMH.    Past Medical History:   History was taken from the patient as noted below.  Past Medical History:   Diagnosis Date     Depressive disorder     Teen/early twenties     Dysmenorrhea      Endometriosis     treated in past     Fibroid uterus     treated in past     Hormone replacement therapy     post hysterectomy     Ovarian abscess     left tuboovarian abscess, s/p hysterectomy, bilateral salpingectomy and left oophorectomy     Ovarian cyst     right ovarian cyst, s/p right oophorectomy     Polycystic ovaries     treated in past     PONV (postoperative nausea and vomiting)      Urinary tract infection        Past Social History:   Past Surgical History:   Procedure Laterality Date     ABDOMEN SURGERY  May 2013 & Sept 2018    Left oophorectomy &  hyst in 2013. Right oophorectomy in 2018     CYSTOSCOPY N/A 09/10/2018    Procedure: CYSTOSCOPY;;  Surgeon: Marcus Burks MD;  Location: [facility] OR     HYSTERECTOMY, PAP NO LONGER INDICATED       LAPAROSCOPIC HYSTERECTOMY TOTAL, BILATERAL SALPINGO-OOPHORECTOMY, COMBINED  05/20/2013    Procedure: COMBINED LAPAROSCOPIC HYSTERECTOMY TOTAL, SALPINGO-OOPHORECTOMY;  Laparoscopic total hysterectomy & bilateral salpingectomy & left oopherectomy & cystoscopy;  Surgeon: Marcus Burks MD;  Location: [facility] OR     LAPAROSCOPIC OOPHORECTOMY Right 09/10/2018    Procedure: LAPAROSCOPIC OOPHORECTOMY;  Laparoscopic Right Oophorectomy & Cystoscopy & Retroperitoneal Exploration & Biopsy of Endometriosis;  Surgeon: Marcus Burks MD;  Location: [facility] OR     WISDOM TOOTH EXTRACTION  06/2007     Personal or family history of anesthesia problems: Yes: Post operative nausea and vomiting    Family History:   Family History   Problem Relation Age of Onset     Depression Mother      Unknown/Adopted Father      Substance Abuse Brother         alcohol     Anxiety Disorder Brother      Lipids Maternal Grandmother      Heart Disease Maternal Grandmother      Eye Disorder Maternal Grandmother         macular degeneration     Diabetes Maternal Grandmother      Hypertension Maternal Grandmother      Hyperlipidemia Maternal Grandmother      Kidney Cancer Maternal Grandmother      Alcohol/Drug Maternal Grandfather         alcohol     Depression Maternal Grandfather      Colon Cancer Maternal Grandfather      Hypertension Maternal Grandfather      Substance Abuse Maternal Grandfather         alcohol     Skin Cancer Maternal Grandfather      Asthma No family hx of      Breast Cancer No family hx of      Cancer - colorectal No family hx of      Prostate Cancer No family hx of      Cerebrovascular Disease No family hx of           Specific Family History in First Degree Relatives:       FH of Kidney Dz: No  FH of Diabetes: No       FH of  Hypertension: No FH of CAD: No       FH of Cancer: No  FH of Kidney Cancer: No    Personal History:   Social History     Socioeconomic History     Marital status:      Spouse name: Not on file     Number of children: 2     Years of education: Not on file     Highest education level: Not on file   Occupational History     Employer: [employer]   Tobacco Use     Smoking status: Former     Packs/day: 1.00     Years: 3.00     Additional pack years: 0.00     Total pack years: 3.00     Types: Cigarettes     Quit date: 2008     Years since quittin.0     Smokeless tobacco: Never     Tobacco comments:     occasional smoker   Vaping Use     Vaping Use: Never used   Substance and Sexual Activity     Alcohol use: Not Currently     Drug use: Never     Sexual activity: Yes     Partners: Male     Birth control/protection: None     Comment:  had vasectomy   Other Topics Concern     Parent/sibling w/ CABG, MI or angioplasty before 65F 55M? No   Social History Narrative     maternity  since 2013     Social Determinants of Health     Financial Resource Strain: Low Risk  (2024)    Financial Resource Strain      Within the past 12 months, have you or your family members you live with been unable to get utilities (heat, electricity) when it was really needed?: No   Food Insecurity: Low Risk  (2024)    Food Insecurity      Within the past 12 months, did you worry that your food would run out before you got money to buy more?: No      Within the past 12 months, did the food you bought just not last and you didn t have money to get more?: No   Transportation Needs: Low Risk  (2024)    Transportation Needs      Within the past 12 months, has lack of transportation kept you from medical appointments, getting your medicines, non-medical meetings or appointments, work, or from getting things that you need?: No   Physical Activity: Sufficiently Active (2024)    Exercise  Vital Sign      Days of Exercise per Week: 5 days      Minutes of Exercise per Session: 40 min   Stress: No Stress Concern Present (2/14/2024)    [employer] - Occupational Stress Questionnaire      Feeling of Stress : Not at all   Social Connections: Unknown (2/14/2024)    Social Connection and Isolation Panel [NHANES]      Frequency of Communication with Friends and Family: Not on file      Frequency of Social Gatherings with Friends and Family: Once a week      Attends Worship Services: Not on file      Active Member of Clubs or Organizations: Not on file      Attends Club or Organization Meetings: Not on file      Marital Status: Not on file   Interpersonal Safety: Not on file   Housing Stability: Low Risk  (2/14/2024)    Housing Stability      Do you have housing? : Yes      Are you worried about losing your housing?: No          Specific Social History:       Health Insurance Status: Yes       Employment Status: Full time  Occupation:                        Living Arrangements: lives with their spouse       Social Support: Yes       Presence of increased risk for disease transmission behaviors as defined by PHS guidelines: No        Allergies:  Allergies   Allergen Reactions     Nkda [No Known Drug Allergy]        Medications:  Current Outpatient Medications   Medication Sig     estradiol (ESTRACE) 2 MG tablet Take 0.5 tablets (1 mg) by mouth daily     Multiple Vitamins-Minerals (DAILY MULTI PO) Take  by mouth.     Multiple Vitamins-Minerals (IMMUNE SUPPORT PO) Immune Support     UNABLE TO FIND Take 1,000 mg by mouth daily MEDICATION NAME: Salomon     No current facility-administered medications for this visit.     Medications Discontinued During This Encounter   Medication Reason     ibuprofen (ADVIL/MOTRIN) 200 MG tablet      ondansetron (ZOFRAN) 4 MG tablet      tretinoin (RETIN-A) 0.025 % external cream      scopolamine (TRANSDERM) 1 MG/3DAYS 72 hr patch       TYLENOL 325 MG OR TABS      estradiol (ESTRACE) 2 MG tablet          Vitals:      2/9/2024     8:00 AM 2/9/2024     8:01 AM 2/9/2024     8:03 AM   Vital Signs   Systolic 99 106 103   Diastolic 65 68 67       Exam:   GENERAL APPEARANCE: alert and no distress  HENT: mouth without ulcers or lesions  RESP: lungs clear to auscultation - no rales, rhonchi or wheezes  CV: regular rhythm, normal rate, no rub, no murmur  EDEMA: no LE edema bilaterally  ABDOMEN: soft, nondistended, nontender, bowel sounds normal  MS: extremities normal - no gross deformities noted, no evidence of inflammation in joints, no muscle tenderness  SKIN: no rash    Results:   Labs and imaging were ordered for this visit and reviewed by me.  Recent Results (from the past 336 hour(s))   EBV Capsid Antibody IgM    Collection Time: 02/09/24  6:46 AM   Result Value Ref Range    EBV Capsid Abi IgM Instrument Value <10.0 <36.0 U/mL    EBV Capsid Antibody IgM No detectable antibody. No detectable antibody.   EBV Capsid Antibody IgG    Collection Time: 02/09/24  6:46 AM   Result Value Ref Range    EBV Capsid Abi IgG Instrument Value 597.0 (H) <18.0 U/mL    EBV Capsid Antibody IgG Positive (A) No detectable antibody.   CMV Antibody IgG    Collection Time: 02/09/24  6:46 AM   Result Value Ref Range    CMV Abi IgG Instrument Value <0.20 <0.60 U/mL    CMV Antibody IgG No detectable antibody. No detectable antibody.    Trypanosoma Cruzi    Collection Time: 02/09/24  6:46 AM   Result Value Ref Range    Trypanosoma Cruzi Non-reactive    Strongyloides antibody IgG    Collection Time: 02/09/24  6:46 AM   Result Value Ref Range    Strongyloides Abi IgG 0.1 <=0.9 IV   West Nile Virus Antibody IgG IgM    Collection Time: 02/09/24  6:46 AM   Result Value Ref Range    West Nile IgG Serum 0.10 <=1.29 IV    West Nile IgM Serum 0.01 <=0.89 IV   Treponema Abs w Reflex to RPR and Titer    Collection Time: 02/09/24  6:46 AM   Result Value Ref Range    Treponema Antibody Total  Nonreactive Nonreactive   HIV Antigen Antibody Combo Pretransplant Cascade    Collection Time: 02/09/24  6:46 AM   Result Value Ref Range    HIV Antigen Antibody Combo Pretransplant Nonreactive Nonreactive   Hepatitis C antibody    Collection Time: 02/09/24  6:46 AM   Result Value Ref Range    Hepatitis C Antibody Nonreactive Nonreactive   Hepatitis B surface antigen    Collection Time: 02/09/24  6:46 AM   Result Value Ref Range    Hepatitis B Surface Antigen Nonreactive Nonreactive   Hepatitis B Surface Antibody    Collection Time: 02/09/24  6:46 AM   Result Value Ref Range    Hepatitis B Surface Antibody Nonreactive     Hepatitis B Surface Antibody Instrument Value 4.07 <8.5 m[IU]/mL   Hepatitis B core antibody    Collection Time: 02/09/24  6:46 AM   Result Value Ref Range    Hepatitis B Core Antibody Total Nonreactive Nonreactive   Protein  random urine    Collection Time: 02/09/24  6:46 AM   Result Value Ref Range    Total Protein Urine mg/dL <6.0   mg/dL    Total Protein Urine mg/mg Creat      Creatinine Urine mg/dL 18.0 mg/dL   Albumin Random Urine Quantitative with Creat Ratio    Collection Time: 02/09/24  6:46 AM   Result Value Ref Range    Creatinine Urine mg/dL 17.5 mg/dL    Albumin Urine mg/L <12.0 mg/L    Albumin Urine mg/g Cr     Routine UA with microscopic    Collection Time: 02/09/24  6:46 AM   Result Value Ref Range    Color Urine Light Yellow Colorless, Straw, Light Yellow, Yellow    Appearance Urine Clear Clear    Glucose Urine Negative Negative mg/dL    Bilirubin Urine Negative Negative    Ketones Urine Negative Negative mg/dL    Specific Gravity Urine 1.004 1.003 - 1.035    Blood Urine Negative Negative    pH Urine 6.5 5.0 - 7.0    Protein Albumin Urine Negative Negative mg/dL    Urobilinogen Urine Normal Normal, 2.0 mg/dL    Nitrite Urine Negative Negative    Leukocyte Esterase Urine Negative Negative    Bacteria Urine Few (A) None Seen /HPF    RBC Urine 0 <=2 /HPF    WBC Urine 1 <=5 /HPF     Squamous Epithelials Urine 1 <=1 /HPF   CBC with platelets    Collection Time: 02/09/24  6:46 AM   Result Value Ref Range    WBC Count 3.7 (L) 4.0 - 11.0 10e3/uL    RBC Count 4.83 3.80 - 5.20 10e6/uL    Hemoglobin 12.9 11.7 - 15.7 g/dL    Hematocrit 40.7 35.0 - 47.0 %    MCV 84 78 - 100 fL    MCH 26.7 26.5 - 33.0 pg    MCHC 31.7 31.5 - 36.5 g/dL    RDW 12.2 10.0 - 15.0 %    Platelet Count 262 150 - 450 10e3/uL   Partial thromboplastin time    Collection Time: 02/09/24  6:46 AM   Result Value Ref Range    aPTT 28 22 - 38 Seconds   INR    Collection Time: 02/09/24  6:46 AM   Result Value Ref Range    INR 0.95 0.85 - 1.15   Hemoglobin A1c    Collection Time: 02/09/24  6:46 AM   Result Value Ref Range    Hemoglobin A1C 5.4 <5.7 %   Phosphorus    Collection Time: 02/09/24  6:46 AM   Result Value Ref Range    Phosphorus 3.5 2.5 - 4.5 mg/dL   Uric acid    Collection Time: 02/09/24  6:46 AM   Result Value Ref Range    Uric Acid 3.8 2.4 - 5.7 mg/dL   Lipid Profile    Collection Time: 02/09/24  6:46 AM   Result Value Ref Range    Cholesterol 154 <200 mg/dL    Triglycerides 52 <150 mg/dL    Direct Measure HDL 80 >=50 mg/dL    LDL Cholesterol Calculated 64 <=100 mg/dL    Non HDL Cholesterol 74 <130 mg/dL    Patient Fasting > 8hrs? Yes    Comprehensive metabolic panel    Collection Time: 02/09/24  6:46 AM   Result Value Ref Range    Sodium 140 135 - 145 mmol/L    Potassium 3.5 3.4 - 5.3 mmol/L    Carbon Dioxide (CO2) 26 22 - 29 mmol/L    Anion Gap 10 7 - 15 mmol/L    Urea Nitrogen 12.8 6.0 - 20.0 mg/dL    Creatinine 0.75 0.51 - 0.95 mg/dL    GFR Estimate >90 >60 mL/min/1.73m2    Calcium 9.8 8.6 - 10.0 mg/dL    Chloride 104 98 - 107 mmol/L    Glucose 86 70 - 99 mg/dL    Alkaline Phosphatase 58 40 - 150 U/L    AST 31 0 - 45 U/L    ALT 37 0 - 50 U/L    Protein Total 7.9 6.4 - 8.3 g/dL    Albumin 4.6 3.5 - 5.2 g/dL    Bilirubin Total 0.5 <=1.2 mg/dL   Quantiferon TB Gold Plus Grey Tube    Collection Time: 02/09/24  6:46 AM     Specimen: Peripheral IV; Blood   Result Value Ref Range    Quantiferon Nil Tube 0.01 IU/mL   Quantiferon TB Gold Plus Green Tube    Collection Time: 02/09/24  6:46 AM    Specimen: Peripheral IV; Blood   Result Value Ref Range    Quantiferon TB1 Tube 0.01 IU/mL   Quantiferon TB Gold Plus Yellow Tube    Collection Time: 02/09/24  6:46 AM    Specimen: Peripheral IV; Blood   Result Value Ref Range    Quantiferon TB2 Tube 0.02    Quantiferon TB Gold Plus Purple Tube    Collection Time: 02/09/24  6:46 AM    Specimen: Peripheral IV; Blood   Result Value Ref Range    Quantiferon Mitogen 10.00 IU/mL   Adult Type and Screen    Collection Time: 02/09/24  6:46 AM   Result Value Ref Range    ABO/RH(D) O POS     Antibody Screen Negative Negative    SPECIMEN EXPIRATION DATE [id number]    Quantiferon TB Gold Plus    Collection Time: 02/09/24  6:46 AM    Specimen: Peripheral IV; Blood   Result Value Ref Range    Quantiferon-TB Gold Plus Negative Negative    TB1 Ag minus Nil Value 0.00 IU/mL    TB2 Ag minus Nil Value 0.01 IU/mL    Mitogen minus Nil Result 9.99 IU/mL    Nil Result 0.01 IU/mL   ABO and Rh 2nd type and screen required    Collection Time: 02/09/24  7:21 AM   Result Value Ref Range    ABO/RH(D) O POS     SPECIMEN EXPIRATION DATE [id number]    HLA-ABC Typing High Resolution    Collection Time: 02/09/24  8:20 AM   Result Value Ref Range    ABTEST METHOD NGS     hiresA-1 A*02:01     hiresA-1Equiv 2     hiresA-2 A*11:01     hiresA-2Equiv 11     hiresB-1 B*07:02     hiresB-1Equiv 7     hiresB-2 B*51:01     hiresB-2Equiv 51     hiresC-1 C*07:02     hiresC-1Equiv 7     hiresC-2 C*15:02     hiresC-2Equiv 15     hiresBw-1 Bw*4     hiresBw-2 Bw*6    HLA-DR Typing High Resolution    Collection Time: 02/09/24  8:20 AM   Result Value Ref Range    DRhiresTestM NGS     hiresDPA1-1 DPA1*01:03     hiresDPB1-1 DPB1*04:01     hiresDPB1-1N HJMR 04:01/126:01     dxaDPB1-2 DPB1*04:02     vaibhavesDPB1-2N FNVS 04:02/105:01     vaibhavesDQA1-1  DQA1*01:01     hiresDQA1-2 DQA1*01:02     hiresDQB1-1 DQB1*05:01     hiresDQB1-1Equiv 5     hiresDQB1-2 DQB1*06:02     hiresDQB1-2Equiv 6     hiresDRB1-1 DRB1*01:01     hiresDRB1-1Equiv 1     hiresDRB1-2 DRB1*15:01     hiresDRB1-2Equiv 15     hiresDRB5-1 DRB5*01:01     hiresDRB5-1Equiv 51    Iohexol 1st Order    Collection Time: 02/09/24  9:35 AM   Result Value Ref Range    Iohexol Time 1 6.46 mg/dL    Iohexol Time 2 2.57 mg/dL    Iohexol Body Surface Area 1.876 m2    Iohexol Raw Clearance 115 mL/min    Iohexol Std Clearance 106 /1.73 m2   EKG 12-lead complete w/read - Clinics    Collection Time: 02/09/24  1:12 PM   Result Value Ref Range    Systolic Blood Pressure  mmHg    Diastolic Blood Pressure  mmHg    Ventricular Rate 60 BPM    Atrial Rate 60 BPM    AK Interval 176 ms    QRS Duration 80 ms     ms    QTc 408 ms    P Axis -1 degrees    R AXIS 78 degrees    T Axis 41 degrees    Interpretation ECG       Sinus rhythm  Normal ECG  No previous ECGs available  Confirmed by MD RACQUEL, DAVONTE ([id number]) on 2/12/2024 9:51:00 AM               Again, thank you for allowing me to participate in the care of your patient.        Sincerely,        Toribio Cortes MD

## 2024-02-09 NOTE — DISCHARGE INSTRUCTIONS

## 2024-02-09 NOTE — PROGRESS NOTES
[facility] SOLID ORGAN TRANSPLANT  OUTPATIENT MNT: KIDNEY DONOR EVALUATION     Current BMI: 26.4 (HT 66 in,  lbs/74 kg)    8 Year Estimated Risk of T2DM  </= 3%     TIME SPENT: 15 minutes  VISIT TYPE: Initial  REFERRING PHYSICIAN: Marilyn   PT ACCOMPANIED BY: her      NUTRITION ASSESSMENT  H/o kidney stones: no  Parental h/o DM: no  H/o GDM or HTN in pregnancy (if applicable): borderline pre-eclampsia w/ 1 pregnancy     NutriDyn brand  - Everyday Essentials: MVI + proprietary blend- including green tea extract (cases of hepatotoxicity have been widely reported)  - Immune Support: vit C, vit D, zinc, selenium, olive leaf extract, mixed mushroom blend    * reishi- one case of liver failure   * shiitake   * fuling    * turkey tail - reports of elevated LFTs   * oyster mushroom   * cordyceps sinesis - 1 case of elevated serum creatinine, 1 case report of acute cholestatic hepatitis    * maitake   - Tony: vit B6, l-tryptophan, lemon balm, l-theanine, hops extract, valerian extract (several case reports of hepatotoxicity), melatonin    Others:   - Ashwagandha: cases of liver injury reported   - CBD    Protein Supplement: 1 scoop protein powder (22 g) per day    Weight hx: overall stable     PHYSICAL ACTIVITY   5 days/week- cardio & strength     DIET RECALL  Breakfast Protein shake after workout, then soon after will have Greek yogurt w/ fruit or eggs & toast   Lunch Leftovers; salad    Dinner Homemade pizza; turkey meatloaf w/ [place]; chicken     Snacks Fruit, occasional nuts    Beverages Water (60-80 oz/day), occasional coffee, 1 Celsius/day at the most    Alcohol None    Dining out 1x/month      LABS  Recent Labs   Lab Test 02/09/24  0646 10/13/20  0857   CHOL 154 173   HDL 80 83   LDL 64 78   TRIG 52 59       FBG = 86  A1c = 5.4  BP = wnl x 3     Prediction of Incident Diabetes Mellitus in Middle-aged Adults: The [place] Offspring Study  Adi Rios MD; James B. Meigs, MD, MPH;  Kathy Olvera, PhD; Sarah Lockhart MD, MPH; Dariel Duvall MD; Marcus Navarrete Sr,   PhD  Pt's estimated risk for T2DM (per Table 6 above)  Pt received points for the following criteria: BMI>25  Total points: 2  8-Year estimated risk of T2DM: </= 3%    NUTRITION DIAGNOSIS  No nutrition diagnosis identified at this time.    NUTRITION INTERVENTION  Nutrition education provided:  Reviewed overall healthy diet guidelines for pre and post kidney donation. Discussed maintenance of a healthy weight and Na+ intake <3000 mg/day (<2000 mg/day if HTN).    Avoid the following post op d/t unknown effects on the organs:  - Herbal, Chinese, holistic, chiropractic, natural, alternative medicines and supplements  - Detoxes and cleanses  - Weight loss pills  - Protein powders or other products with extracts or herbs (ie green tea extract)    Patient Understanding: Pt verbalized understanding of education provided.  Expected Engagement: Good  Follow-Up Plans: PRN     NUTRITION GOALS  No nutrition goals identified at this time     Savannah Wills, RD, LD, CCTD

## 2024-02-09 NOTE — NURSING NOTE
"Chief Complaint   Patient presents with    Transplant Donor Evaluation     Kidney donor eval       BP 1: 104/67  BP 2: 99/65  BP 3: 106/68    /67   Pulse 55   Resp 16   Ht 1.676 m (5' 6\")   Wt 74.4 kg (164 lb)   LMP 04/11/2013 (LMP Unknown)   SpO2 99%   BMI 26.47 kg/m      RACHELL MARVIN, RN on 2/9/2024 at 7:59 AM    "

## 2024-02-09 NOTE — NURSING NOTE
Chief Complaint   Patient presents with    Blood Draw     Labs drawn via PIV by RN in lab.  VS taken       Labs drawn from PIV placed by RN. Line flushed with saline. Vitals taken. Pt checked in for appointment(s).    Margaux Shafer RN

## 2024-02-09 NOTE — PROGRESS NOTES
"Transplant Surgery Consult Note    Medical record number: [id number]  YOB: 1985,   Consult requested by the patient for evaluation of kidney donation candidacy.    Assessment and Recommendations: Ms. Wood appears to be a good candidate for kidney donation at this point in the evaluation. The following issues will need to be addressed prior to formal review:    Iohexol ordered for today for assessment of adequate kidney function for donation. Will be reviewed when resulted  Donor labs ordered for today and reviewed to include: CBC, CMP, Mg, PO4, hemoglobin A1c, lipid panel, blood type x 2, hemoglobin A1c, UA with microscopic, urine culture, urine protein/cr, INR/PTT, Quantiferon gold, hepatitis C (antibody), hepatitis B panel, HIV, treponemia, West Nile (antibody panel), CMV (antibody panel), EBV (antibody panel), pregnancy screen (for females of childbearing age), PSA (males >50yrs), HLA tissue typing, buccal swab for eplet typing  Social work consult ordered  Dietician consult ordered  Transplant nephrology consult ordered  Transplant coordinator consult ordered  ANAT (independent living donor advovate) consult ordered  EKG ordered for today and will be reviewed when resulted. Suitable to proceed today with this testing today:  Yes   Chest x-ray ordered for today and will be reviewed when resulted. Suitable to proceed with this testing today:  Yes   CT angio of abdomen and pelvis for anatomical assessment. Images and report to be reviewed when resulted.  Suitable to proceed with this testing today:  Yes  After review of the above, additional testing/concerns include:    I reviewed her operative reports from her hysterectomy and right oopherectomy. The right oopherectomy mentions needing to \"peel\" the ureter off the scare ovary/scar tissue (history of endometriosis). The pictures from the surgery do not look terribly scarred. We will need to look closely at the CT to see if there is any " independent indication of damage to the right ureter. She may need to donate this kidney given the potential of ureteral issues on this side. The R ureter is dilated on the CT urogram so I would recommend she donate this one.  We discussed her HRT for her early menopause (since she has had BSO) and risks of blood clots. We discussed that we normally ask women to stop this for a short time around surgery to decrease DVT risk since HRT estrogen doses are higher than OCP estrogen dosages. She is interested in getting off of it anyway and was going to talk to her doctor since she is concerned about the breast cancer risks.  She is due for her mammogram. She has been having them due to being on HRT after BSO.    The majority of our visit today was spent in counseling regarding the medical and surgical risks of kidney donation, the typical deonte-and post-operative experience and recovery/return to work pattern.  Surgical risks may be transient or permanent and can include but not limited to decreased kidney function or acute kidney failure and the need for dialysis or kidney transplant for the living donor in the immediate post-operative period. We also talked about post-op visits and longer term health care maintenance, as well as the implications of having one remaining kidney. This discussion included, but was not limited to rates of complications such as bleeding, infection, need for transfusion, reoperation, other organ injury, future bowel obstruction, incisional hernia, port site pain, varicocele, venous thrombosis, pulmonary embolism, renal failure, and death (3 per 10,000). The patient understands that if end stage renal failure happens that dialysis or transplant would be required. For female donor of child bearing age, the risks of preeclampsi or gestational hypertension during pregnancies after donation were discussed .  At the conclusion of the visit, all questions had been answered and MsAmanda SharmaIsrael's candidacy for  donation will be reviewed at our Multidisciplinary Donor Selection Committee. She will stay in contact with the nurse coordinator with any concerns.      Total time: 60 minutes        Danae Sanders MD FACS  Associate Professor of Surgery  Director, Living Kidney Donor Program.  ---------------------------------------------------------------------------------------------------    HPI: Virginia Wood is a 38 year old year old female who presents for a kidney donor evaluation.  Patient would like to donate to a friend.      Personal history of:   No    Yes  Cancer:    [x]      []             Comment:     Diabetes   [x]      []  Comment:    Thombosis   [x]      []  Comment:       Hepatitis   [x]      []  Comment:    Tuberculosis   [x]      []  Comment:   Back or neck pain:  [x]      []  Comment:      Kidney stones   [x]      []  Comment:                  Kidney infections  [x]      []  Comment:           Urinary retention  [x]      []            Comment:   Regular NSAID use:  [x]      []            Comment:      Constipation:   [x]      []            Comment:      Caodaism  [x]      []            Comment:      Other:    [x]      []            Comment:    Estradiol     Past Medical History:   Diagnosis Date     Depressive disorder     Teen/early twenties     Endometriosis     treated in past     Fibroid uterus     treated in past     Hormone replacement therapy     post hysterectomy     Polycystic ovaries     treated in past     PONV (postoperative nausea and vomiting)      Past Surgical History:   Procedure Laterality Date     ABDOMEN SURGERY  May 2013 & Sept 2018    Left oophorectomy & hyst in 2013. Right oophorectomy in 2018     CYSTOSCOPY N/A 09/10/2018    Procedure: CYSTOSCOPY;;  Surgeon: Marcus Burks MD;  Location: [facility] OR     HYSTERECTOMY, PAP NO LONGER INDICATED       LAPAROSCOPIC HYSTERECTOMY TOTAL, BILATERAL SALPINGO-OOPHORECTOMY, COMBINED  05/20/2013    Procedure: COMBINED  LAPAROSCOPIC HYSTERECTOMY TOTAL, SALPINGO-OOPHORECTOMY;  Laparoscopic total hysterectomy & bilateral salpingectomy & left oopherectomy & cystoscopy;  Surgeon: Marcus Burks MD;  Location: [facility] OR     LAPAROSCOPIC OOPHORECTOMY Right 09/10/2018    Procedure: LAPAROSCOPIC OOPHORECTOMY;  Laparoscopic Right Oophorectomy & Cystoscopy & Retroperitoneal Exploration & Biopsy of Endometriosis;  Surgeon: Marcus Burks MD;  Location: [facility] OR     [facility] ORAL SURGERY PROCEDURE  2007    wisdom teeth     Family History   Problem Relation Age of Onset     Depression Mother      Unknown/Adopted Father      Substance Abuse Brother         alcohol     Anxiety Disorder Brother      Lipids Maternal Grandmother      Heart Disease Maternal Grandmother      Eye Disorder Maternal Grandmother         macular degeneration     Diabetes Maternal Grandmother      Hypertension Maternal Grandmother      Hyperlipidemia Maternal Grandmother      Kidney Cancer Maternal Grandmother      Alcohol/Drug Maternal Grandfather         alcohol     Depression Maternal Grandfather      Colon Cancer Maternal Grandfather      Hypertension Maternal Grandfather      Substance Abuse Maternal Grandfather         alcohol     Skin Cancer Maternal Grandfather      Asthma No family hx of      Breast Cancer No family hx of      Cancer - colorectal No family hx of      Prostate Cancer No family hx of      Cerebrovascular Disease No family hx of      Social History     Socioeconomic History     Marital status:      Spouse name: Not on file     Number of children: 0     Years of education: Not on file     Highest education level: Not on file   Occupational History     Employer: [employer]   Tobacco Use     Smoking status: Former     Packs/day: 1.00     Years: 3.00     Additional pack years: 0.00     Total pack years: 3.00     Types: Cigarettes     Quit date: 2008     Years since quittin.0     Smokeless tobacco: Never     Tobacco  comments:     occasional smoker   Vaping Use     Vaping Use: Never used   Substance and Sexual Activity     Alcohol use: Not Currently     Comment: 3 drinks a week     Drug use: Never     Sexual activity: Yes     Partners: Male     Birth control/protection: None     Comment:  had vasectomy   Other Topics Concern     Parent/sibling w/ CABG, MI or angioplasty before 65F 55M? No   Social History Narrative    [place] [facility]  since 2013     Social Determinants of Health     Financial Resource Strain: Not on file   Food Insecurity: Not on file   Transportation Needs: Not on file   Physical Activity: Not on file   Stress: Not on file   Social Connections: Not on file   Interpersonal Safety: Not on file   Housing Stability: Not on file       ROS:   CONSTITUTIONAL:  No fevers or chills  EYES: negative for icterus  ENT:  negative for hearing loss, tinnitus and sore throat  RESPIRATORY:  negative for cough, sputum, dyspnea  CARDIOVASCULAR:  negative for chest pain  GASTROINTESTINAL:  negative for nausea, vomiting, diarrhea or constipation  GENITOURINARY:  negative for incontinence, dysuria, bladder emptying problems  HEME:  No easy bruising  INTEGUMENT:  negative for rash and pruritus  NEURO:  Negative for headache, seizure disorder    Allergies:   Allergies   Allergen Reactions     Nkda [No Known Drug Allergy]        Medications:  Prescription Medications as of 2024         Rx Number Disp Refills Start End Last Dispensed Date Next Fill Date Owning Pharmacy    estradiol (ESTRACE) 2 MG tablet  -- -- 2024 --       Sig: Take 0.5 tablets (1 mg) by mouth daily    Class: Historical    Route: Oral    Multiple Vitamins-Minerals (DAILY MULTI PO)  -- --  --       Sig: Take  by mouth.    Class: Historical    Route: Oral    Multiple Vitamins-Minerals (IMMUNE SUPPORT PO)  -- --  --       Sig: Immune Support    Class: Historical    Route: Oral    UNABLE TO FIND  -- --  --       Sig: Take 1,000 mg by mouth daily  MEDICATION NAME: Salomon    Class: Historical    Route: Oral          Clinic-Administered Medications as of 2/9/2024         Dose Frequency Start End    iohexol (OMNIPAQUE) 350 MG/ML injectable solution 4 mL (Completed) 4 mL ONCE 2/9/2024 2/9/2024    Route: Intravenous            Exam:   Temp:  [97.7  F (36.5  C)] 97.7  F (36.5  C)  Pulse:  [55-62] 55  Resp:  [16] 16  BP: ()/(65-69) 103/67  SpO2:  [99 %-100 %] 99 %  Body mass index is 26.47 kg/m .  Temp:  [97.7  F (36.5  C)] 97.7  F (36.5  C)  Pulse:  [55-62] 55  Resp:  [16] 16  BP: ()/(65-69) 103/67  SpO2:  [99 %-100 %] 99 %  Appearance: in no apparent distress.   Skin: normal  Head and Neck: Normal, no rashes or jaundice  Respiratory: normal respiratory excursions, no audible wheeze  Cardiovascular: RRR  Abdomen: flat, No distention and Surgical scars consistent with history   Extremeties: Edema, none  Neuro: without deficit       Diagnostics:   Recent Results (from the past 336 hour(s))   Protein  random urine    Collection Time: 02/09/24  6:46 AM   Result Value Ref Range    Total Protein Urine mg/dL <6.0   mg/dL    Total Protein Urine mg/mg Creat      Creatinine Urine mg/dL 18.0 mg/dL   Routine UA with microscopic    Collection Time: 02/09/24  6:46 AM   Result Value Ref Range    Color Urine Light Yellow Colorless, Straw, Light Yellow, Yellow    Appearance Urine Clear Clear    Glucose Urine Negative Negative mg/dL    Bilirubin Urine Negative Negative    Ketones Urine Negative Negative mg/dL    Specific Gravity Urine 1.004 1.003 - 1.035    Blood Urine Negative Negative    pH Urine 6.5 5.0 - 7.0    Protein Albumin Urine Negative Negative mg/dL    Urobilinogen Urine Normal Normal, 2.0 mg/dL    Nitrite Urine Negative Negative    Leukocyte Esterase Urine Negative Negative    Bacteria Urine Few (A) None Seen /HPF    RBC Urine 0 <=2 /HPF    WBC Urine 1 <=5 /HPF    Squamous Epithelials Urine 1 <=1 /HPF   CBC with platelets    Collection Time: 02/09/24   6:46 AM   Result Value Ref Range    WBC Count 3.7 (L) 4.0 - 11.0 10e3/uL    RBC Count 4.83 3.80 - 5.20 10e6/uL    Hemoglobin 12.9 11.7 - 15.7 g/dL    Hematocrit 40.7 35.0 - 47.0 %    MCV 84 78 - 100 fL    MCH 26.7 26.5 - 33.0 pg    MCHC 31.7 31.5 - 36.5 g/dL    RDW 12.2 10.0 - 15.0 %    Platelet Count 262 150 - 450 10e3/uL   Partial thromboplastin time    Collection Time: 02/09/24  6:46 AM   Result Value Ref Range    aPTT 28 22 - 38 Seconds   INR    Collection Time: 02/09/24  6:46 AM   Result Value Ref Range    INR 0.95 0.85 - 1.15   Phosphorus    Collection Time: 02/09/24  6:46 AM   Result Value Ref Range    Phosphorus 3.5 2.5 - 4.5 mg/dL   Uric acid    Collection Time: 02/09/24  6:46 AM   Result Value Ref Range    Uric Acid 3.8 2.4 - 5.7 mg/dL   Lipid Profile    Collection Time: 02/09/24  6:46 AM   Result Value Ref Range    Cholesterol 154 <200 mg/dL    Triglycerides 52 <150 mg/dL    Direct Measure HDL 80 >=50 mg/dL    LDL Cholesterol Calculated 64 <=100 mg/dL    Non HDL Cholesterol 74 <130 mg/dL    Patient Fasting > 8hrs? Yes    Comprehensive metabolic panel    Collection Time: 02/09/24  6:46 AM   Result Value Ref Range    Sodium 140 135 - 145 mmol/L    Potassium 3.5 3.4 - 5.3 mmol/L    Carbon Dioxide (CO2) 26 22 - 29 mmol/L    Anion Gap 10 7 - 15 mmol/L    Urea Nitrogen 12.8 6.0 - 20.0 mg/dL    Creatinine 0.75 0.51 - 0.95 mg/dL    GFR Estimate >90 >60 mL/min/1.73m2    Calcium 9.8 8.6 - 10.0 mg/dL    Chloride 104 98 - 107 mmol/L    Glucose 86 70 - 99 mg/dL    Alkaline Phosphatase 58 40 - 150 U/L    AST 31 0 - 45 U/L    ALT 37 0 - 50 U/L    Protein Total 7.9 6.4 - 8.3 g/dL    Albumin 4.6 3.5 - 5.2 g/dL    Bilirubin Total 0.5 <=1.2 mg/dL   Adult Type and Screen    Collection Time: 02/09/24  6:46 AM   Result Value Ref Range    ABO/RH(D) O POS     Antibody Screen Negative Negative    SPECIMEN EXPIRATION DATE [id number]    ABO and Rh 2nd type and screen required    Collection Time: 02/09/24  7:21 AM   Result Value  Ref Range    ABO/RH(D) O POS     SPECIMEN EXPIRATION DATE [id number]          Opt out

## 2024-02-09 NOTE — PROGRESS NOTES
Living Kidney Donor Consent per OPTN Policy 14.2 for Independent Living Donor Advocate (ANAT)    Organ Type: living kidney donor   Presenting Information:  Virginia presents to [facility] Trans[facility] to complete a living donor evaluation since she is interested in becoming a living donor for a friend.      Written assurance has been obtained from the potential donor that he/she:   Is willing to donate  Is free from inducement and coercion  Has been informed that the he/she may decline to donate at any time  Has been informed that transplant centers must:   A) Offer donors an opportunity to discontinue the donor consent or evaluation process in a way that is protected and confidential  B) Provide an independent living donor advocate (ANAT) to assist the potential donor during this process    The following was presented to the potential donor in a language in which the potential donor is able to engage in meaningful dialogue:   Education and instruction about all phases of the living donation process including:   Consent  Medical and psychosocial evaluation  Information about the surgical procedure  Pre and post operative care  Benefits of post operative follow up  Disclosure that the recovery hospital will take all reasonable precautions to provide confidentiality for the donor/recipient  Disclosure that it is a federal crime for any person to knowingly acquire, obtain or otherwise transfer any human organ for valuable consideration  Disclosure that the [facility] hospital must provide an independent living donor advocate (ANAT)  Disclosure that health information obtained during the evaluation is subject to the same regulations as all records and could reveal conditions that must be reported to local, state, or federal public health authorities  Disclosure that the [facility] hospital is required to report living donor follow up information at 6 months, 1  year, and 2 years, and that the potential donor must commit to post operative follow up testing coordinated by the [facility]    Disclosure has been provided that these risks may be transient or permanent & include but are not limited to:  Potential psychosocial risks:  Problems with body image  Post-surgery depression or anxiety  Feelings of emotional distress or bereavement if recipient experiences any recurrent disease or in the event of the recipient s death  Impact of donation on the donor s lifestyle, such as limited ability to exercise in the short term post operative recovery period, no driving for the first 2 weeks post op or until the donor is no longer needing pain medications that impair the ability to drive.      Potential financial impacts:  Personal expenses of travel, housing, , lost wages related to donation might not be reimbursed. However, resources may be available to defray some donation-related expenses.   Need for life-long follow up at the donor s expense  Loss of employment or income  Negative impact on the ability to obtain future employment  Negative impact on the ability to obtain, maintain, or afford health, disability, and life insurance  Future health problems experienced by living donors following donation may not be covered by the recipient s insurance      PREPARATION FOR DONATION, RECOVERY, AND POTENTIAL SHORT-LONG-TERM OUTCOMES:  Understanding of the Living Donation Process:  We discussed the role of Independent Living Donor Advocate.  Short and long term medical and psychosocial risks to both, donor and recipient were reviewed and she expressed understanding.  Post surgical restrictions (2 weeks no driving, 6 weeks no lifting over 10 lbs) were reviewed and she appears capable of adhering to the post surgical requirements. The need for a caregiver was discussed and she has good support .  The risk of poor psychosocial outcome including problems with body image,  post-surgery depression or anxiety, or feelings of emotional distress or bereavement if recipient experiences any recurrent disease, poor outcome or death was reviewed.  Additionally, potential financial implications, including the risk of having difficulty obtaining health care insurance, life insurance, disability insurance, or long term care insurance were reviewed, as were available donor grants to assist with donor related expenses.      IMPRESSIONS/RECOMMENDATIONS:  Virginia appears highly motivated to donate a kidney to her friend.  She appears capable of understanding this information and making an informed medical decision.  As ANAT, no concerns were identified today.  She has my contact information and is aware that I am available thoughout the donation process.    Contact Information:  STALIN Garay, [facility] HospitalSW   Independent Living Donor Advocate  [facility], [facility], [facility]  Direct: [phone]  E-Mail: [email or web address]      Time Spent: 20 minutes

## 2024-02-09 NOTE — LETTER
2/9/2024         RE: Virginia Wood  [address]  [place] [address]        Dear Colleague,    Thank you for referring your patient, Virginia Wood, to the [facility]. Please see a copy of my visit note below.    [facility] SOLID ORGAN TRANSPLANT  OUTPATIENT MNT: KIDNEY DONOR EVALUATION     Current BMI: 26.4 (HT 66 in,  lbs/74 kg)    8 Year Estimated Risk of T2DM  </= 3%     TIME SPENT: 15 minutes  VISIT TYPE: Initial  REFERRING PHYSICIAN: Marilyn   PT ACCOMPANIED BY: her      NUTRITION ASSESSMENT  H/o kidney stones: no  Parental h/o DM: no  H/o GDM or HTN in pregnancy (if applicable): borderline pre-eclampsia w/ 1 pregnancy     NutriDyn brand  - Everyday Essentials: MVI + proprietary blend- including green tea extract (cases of hepatotoxicity have been widely reported)  - Immune Support: vit C, vit D, zinc, selenium, olive leaf extract, mixed mushroom blend    * reishi- one case of liver failure   * shiitake   * fuling    * turkey tail - reports of elevated LFTs   * oyster mushroom   * cordyceps sinesis - 1 case of elevated serum creatinine, 1 case report of acute cholestatic hepatitis    * maitake   - Tony: vit B6, l-tryptophan, lemon balm, l-theanine, hops extract, valerian extract (several case reports of hepatotoxicity), melatonin    Others:   - Ashwagandha: cases of liver injury reported   - CBD    Protein Supplement: 1 scoop protein powder (22 g) per day    Weight hx: overall stable     PHYSICAL ACTIVITY   5 days/week- cardio & strength     DIET RECALL  Breakfast Protein shake after workout, then soon after will have Greek yogurt w/ fruit or eggs & toast   Lunch Leftovers; salad    Dinner Homemade pizza; turkey meatloaf w/ [place]; chicken     Snacks Fruit, occasional nuts    Beverages Water (60-80 oz/day), occasional coffee, 1 Celsius/day at the most    Alcohol None    Dining out 1x/month      LABS  Recent Labs   Lab  Test 02/09/24  0646 10/13/20  0857   CHOL 154 173   HDL 80 83   LDL 64 78   TRIG 52 59       FBG = 86  A1c = 5.4  BP = wnl x 3     Prediction of Incident Diabetes Mellitus in Middle-aged Adults: The [place] Offspring Study  Adi Rios MD; James B. Meigs, MD, MPH; Kathy Olvera, PhD; Sarah Lockhart MD, MPH; Dariel Duvall MD; Marcus Navarrete Sr,   PhD  Pt's estimated risk for T2DM (per Table 6 above)  Pt received points for the following criteria: BMI>25  Total points: 2  8-Year estimated risk of T2DM: </= 3%    NUTRITION DIAGNOSIS  No nutrition diagnosis identified at this time.    NUTRITION INTERVENTION  Nutrition education provided:  Reviewed overall healthy diet guidelines for pre and post kidney donation. Discussed maintenance of a healthy weight and Na+ intake <3000 mg/day (<2000 mg/day if HTN).    Avoid the following post op d/t unknown effects on the organs:  - Herbal, Chinese, holistic, chiropractic, natural, alternative medicines and supplements  - Detoxes and cleanses  - Weight loss pills  - Protein powders or other products with extracts or herbs (ie green tea extract)    Patient Understanding: Pt verbalized understanding of education provided.  Expected Engagement: Good  Follow-Up Plans: PRN     NUTRITION GOALS  No nutrition goals identified at this time     Savannah Wills RD, LD, CCTD                                  Again, thank you for allowing me to participate in the care of your patient.        Sincerely,        Savannah Wills RD

## 2024-02-09 NOTE — PROGRESS NOTES
Saw Virginia in clinic on 2/9/24 for Living Kidney Donor Evaluation.     Virginia is interested in donation for her friend.    I provided a folder which included copies of the following:    Living Kidney Donor Evaluation Consent  Paired Exchange Consent  Donor Shield Pamphlet  Living Donor Collective Study information  Kidney for Life pamphlet  Kidney Donors are Heroes! Study synopsis  Most current SRTR data.      I also provided a parking pass and food voucher.      I reviewed the Living Kidney Donor Evaluation Consent, dated 7-6-2020 and Paired Exchange/ NDD consent dated 9-.  I answered any question.    Evaluation Notes:  Internal tissue typing collected and sent

## 2024-02-09 NOTE — LETTER
2024         RE: Virginia Wood  [address] [facility] [id number]        Dear Colleague,    Thank you for referring your patient, Virginia Wood, to the [facility]. Please see a copy of my visit note below.    Donor Iohexol test    Virginia Wood presents today to [facility] for a Donor Iohexol test.      Progress note:  ID verified by name and .     The following information was verified with the patient:  Female Patients is there any possibility of being pregnant No  Is there a history of allergy (skin rash, swelling, ect) to:   A.  Iodine (except skin reactions to betadine): No   B. Intravenous radio-contrast agents: No   C. Seafood No     present during visit today: Not Applicable.    R.N. provided patient with educational handout regarding timed test. Yes    PIV placed in right AC and Iohexol administered over 2 minutes.  Positive blood return verified before and after injection. PIV removed.  20 gauge PIV placed in left AC by [facility] lab staff for blood draws and CT this afternoon.    Medication administered:  Iohexol (Omnipaque 300mg iodine/ml concentration) 5 mls.    Start time: 0730  Stop time: 07      Evaluation nurse in transplant to draw labs at 2 and 4 hours post iohexol administration.  Patient given a slip with the times to get labs drawn and verbalized understanding of the plan.    Patient tolerated the procedure:  Yes    After the infusion patient was discharged to the next appointment.    Administrations This Visit       iohexol (OMNIPAQUE) 350 MG/ML injectable solution 4 mL       Admin Date  2024 Action  $Given Dose  4 mL Route  Intravenous Documented By  Nury Meyers, RN                    Nury Meyers, ERNESTO        Again, thank you for allowing me to participate in the care of your patient.        Sincerely,        Specialty Infusion Nurse

## 2024-02-09 NOTE — LETTER
2/9/2024         RE: Virginia Wood  [address] [facility] [id number]        Dear Colleague,    Thank you for referring your patient, Virginia Wood, to the [facility]. Please see a copy of my visit note below.    Transplant Surgery Consult Note    Medical record number: [id number]  YOB: 1985,   Consult requested by the patient for evaluation of kidney donation candidacy.    Assessment and Recommendations: Ms. Wood appears to be a good candidate for kidney donation at this point in the evaluation. The following issues will need to be addressed prior to formal review:    Iohexol ordered for today for assessment of adequate kidney function for donation. Will be reviewed when resulted  Donor labs ordered for today and reviewed to include: CBC, CMP, Mg, PO4, hemoglobin A1c, lipid panel, blood type x 2, hemoglobin A1c, UA with microscopic, urine culture, urine protein/cr, INR/PTT, Quantiferon gold, hepatitis C (antibody), hepatitis B panel, HIV, treponemia, West Nile (antibody panel), CMV (antibody panel), EBV (antibody panel), pregnancy screen (for females of childbearing age), PSA (males >50yrs), HLA tissue typing, buccal swab for eplet typing  Social work consult ordered  Dietician consult ordered  Transplant nephrology consult ordered  Transplant coordinator consult ordered  ANAT (independent living donor advovate) consult ordered  EKG ordered for today and will be reviewed when resulted. Suitable to proceed today with this testing today:  Yes   Chest x-ray ordered for today and will be reviewed when resulted. Suitable to proceed with this testing today:  Yes   CT angio of abdomen and pelvis for anatomical assessment. Images and report to be reviewed when resulted.  Suitable to proceed with this testing today:  Yes  After review of the above, additional testing/concerns include:    I reviewed her operative reports from her hysterectomy  "and right oopherectomy. The right oopherectomy mentions needing to \"peel\" the ureter off the scare ovary/scar tissue (history of endometriosis). The pictures from the surgery do not look terribly scarred. We will need to look closely at the CT to see if there is any indication of damage to the right ureter. She may need to donate this kidney given the potential of ureteral issues on this side. The R ureter is dilated on the CT urogram so I would recommend she donate this one.  We discussed her HRT for her early menopause (since she has had BSO) and risks of blood clots. We discussed that we normally ask women to stop this for a short time around surgery to decrease DVT risk since HRT estrogen doses are higher than OCP estrogen dosages. She is interested in getting off of it anyway and was going to talk to her doctor since she is concerned about the breast cancer risks.  She is due for her mammogram. She has been having them due to being on HRT after BSO.    The majority of our visit today was spent in counseling regarding the medical and surgical risks of kidney donation, the typical deonte-and post-operative experience and recovery/return to work pattern.  Surgical risks may be transient or permanent and can include but not limited to decreased kidney function or acute kidney failure and the need for dialysis or kidney transplant for the living donor in the immediate post-operative period. We also talked about post-op visits and longer term health care maintenance, as well as the implications of having one remaining kidney. This discussion included, but was not limited to rates of complications such as bleeding, infection, need for transfusion, reoperation, other organ injury, future bowel obstruction, incisional hernia, port site pain, varicocele, venous thrombosis, pulmonary embolism, renal failure, and death (3 per 10,000). The patient understands that if end stage renal failure happens that dialysis or transplant " would be required. For female donor of child bearing age, the risks of preeclampsi or gestational hypertension during pregnancies after donation were discussed .  At the conclusion of the visit, all questions had been answered and Ms. Wood's candidacy for donation will be reviewed at our Multidisciplinary Donor Selection Committee. She will stay in contact with the nurse coordinator with any concerns.      Total time: 60 minutes        Danae Sanders MD FACS  Associate Professor of Surgery  Director, Living Kidney Donor Program.  ---------------------------------------------------------------------------------------------------    HPI: Virginia Wood is a 38 year old year old female who presents for a kidney donor evaluation.  Patient would like to donate to a friend.      Personal history of:   No    Yes  Cancer:    [x]      []             Comment:     Diabetes   [x]      []  Comment:    Thombosis   [x]      []  Comment:       Hepatitis   [x]      []  Comment:    Tuberculosis   [x]      []  Comment:   Back or neck pain:  [x]      []  Comment:      Kidney stones   [x]      []  Comment:                  Kidney infections  [x]      []  Comment:           Urinary retention  [x]      []            Comment:   Regular NSAID use:  [x]      []            Comment:      Constipation:   [x]      []            Comment:      Jehovah's witness  [x]      []            Comment:      Other:    [x]      []            Comment:    Estradiol     Past Medical History:   Diagnosis Date     Depressive disorder     Teen/early twenties     Endometriosis     treated in past     Fibroid uterus     treated in past     Hormone replacement therapy     post hysterectomy     Polycystic ovaries     treated in past     PONV (postoperative nausea and vomiting)      Past Surgical History:   Procedure Laterality Date     ABDOMEN SURGERY  May 2013 & Sept 2018    Left oophorectomy & hyst in 2013. Right oophorectomy in 2018      CYSTOSCOPY N/A 09/10/2018    Procedure: CYSTOSCOPY;;  Surgeon: Marcus Burks MD;  Location: [facility] OR     HYSTERECTOMY, PAP NO LONGER INDICATED       LAPAROSCOPIC HYSTERECTOMY TOTAL, BILATERAL SALPINGO-OOPHORECTOMY, COMBINED  05/20/2013    Procedure: COMBINED LAPAROSCOPIC HYSTERECTOMY TOTAL, SALPINGO-OOPHORECTOMY;  Laparoscopic total hysterectomy & bilateral salpingectomy & left oopherectomy & cystoscopy;  Surgeon: Marcus Burks MD;  Location: [facility] OR     LAPAROSCOPIC OOPHORECTOMY Right 09/10/2018    Procedure: LAPAROSCOPIC OOPHORECTOMY;  Laparoscopic Right Oophorectomy & Cystoscopy & Retroperitoneal Exploration & Biopsy of Endometriosis;  Surgeon: Marcus Burks MD;  Location: [facility] OR     [facility] ORAL SURGERY PROCEDURE  06/2007    wisdom teeth     Family History   Problem Relation Age of Onset     Depression Mother      Unknown/Adopted Father      Substance Abuse Brother         alcohol     Anxiety Disorder Brother      Lipids Maternal Grandmother      Heart Disease Maternal Grandmother      Eye Disorder Maternal Grandmother         macular degeneration     Diabetes Maternal Grandmother      Hypertension Maternal Grandmother      Hyperlipidemia Maternal Grandmother      Kidney Cancer Maternal Grandmother      Alcohol/Drug Maternal Grandfather         alcohol     Depression Maternal Grandfather      Colon Cancer Maternal Grandfather      Hypertension Maternal Grandfather      Substance Abuse Maternal Grandfather         alcohol     Skin Cancer Maternal Grandfather      Asthma No family hx of      Breast Cancer No family hx of      Cancer - colorectal No family hx of      Prostate Cancer No family hx of      Cerebrovascular Disease No family hx of      Social History     Socioeconomic History     Marital status:      Spouse name: Not on file     Number of children: 0     Years of education: Not on file     Highest education level: Not on file   Occupational History     Employer: [employer]  [place]   Tobacco Use     Smoking status: Former     Packs/day: 1.00     Years: 3.00     Additional pack years: 0.00     Total pack years: 3.00     Types: Cigarettes     Quit date: 2008     Years since quittin.0     Smokeless tobacco: Never     Tobacco comments:     occasional smoker   Vaping Use     Vaping Use: Never used   Substance and Sexual Activity     Alcohol use: Not Currently     Comment: 3 drinks a week     Drug use: Never     Sexual activity: Yes     Partners: Male     Birth control/protection: None     Comment:  had vasectomy   Other Topics Concern     Parent/sibling w/ CABG, MI or angioplasty before 65F 55M? No   Social History Narrative    [place] [facility]  since 2013     Social Determinants of Health     Financial Resource Strain: Not on file   Food Insecurity: Not on file   Transportation Needs: Not on file   Physical Activity: Not on file   Stress: Not on file   Social Connections: Not on file   Interpersonal Safety: Not on file   Housing Stability: Not on file       ROS:   CONSTITUTIONAL:  No fevers or chills  EYES: negative for icterus  ENT:  negative for hearing loss, tinnitus and sore throat  RESPIRATORY:  negative for cough, sputum, dyspnea  CARDIOVASCULAR:  negative for chest pain  GASTROINTESTINAL:  negative for nausea, vomiting, diarrhea or constipation  GENITOURINARY:  negative for incontinence, dysuria, bladder emptying problems  HEME:  No easy bruising  INTEGUMENT:  negative for rash and pruritus  NEURO:  Negative for headache, seizure disorder    Allergies:   Allergies   Allergen Reactions     Nkda [No Known Drug Allergy]        Medications:  Prescription Medications as of 2024         Rx Number Disp Refills Start End Last Dispensed Date Next Fill Date Owning Pharmacy    estradiol (ESTRACE) 2 MG tablet  -- -- 2024 --       Sig: Take 0.5 tablets (1 mg) by mouth daily    Class: Historical    Route: Oral    Multiple Vitamins-Minerals (DAILY MULTI PO)  --  --  --       Sig: Take  by mouth.    Class: Historical    Route: Oral    Multiple Vitamins-Minerals (IMMUNE SUPPORT PO)  -- --  --       Sig: Immune Support    Class: Historical    Route: Oral    UNABLE TO FIND  -- --  --       Sig: Take 1,000 mg by mouth daily MEDICATION NAME: Salomon    Class: Historical    Route: Oral          Clinic-Administered Medications as of 2/9/2024         Dose Frequency Start End    iohexol (OMNIPAQUE) 350 MG/ML injectable solution 4 mL (Completed) 4 mL ONCE 2/9/2024 2/9/2024    Route: Intravenous            Exam:   Temp:  [97.7  F (36.5  C)] 97.7  F (36.5  C)  Pulse:  [55-62] 55  Resp:  [16] 16  BP: ()/(65-69) 103/67  SpO2:  [99 %-100 %] 99 %  Body mass index is 26.47 kg/m .  Temp:  [97.7  F (36.5  C)] 97.7  F (36.5  C)  Pulse:  [55-62] 55  Resp:  [16] 16  BP: ()/(65-69) 103/67  SpO2:  [99 %-100 %] 99 %  Appearance: in no apparent distress.   Skin: normal  Head and Neck: Normal, no rashes or jaundice  Respiratory: normal respiratory excursions, no audible wheeze  Cardiovascular: RRR  Abdomen: flat, No distention and Surgical scars consistent with history   Extremeties: Edema, none  Neuro: without deficit       Diagnostics:   Recent Results (from the past 336 hour(s))   Protein  random urine    Collection Time: 02/09/24  6:46 AM   Result Value Ref Range    Total Protein Urine mg/dL <6.0   mg/dL    Total Protein Urine mg/mg Creat      Creatinine Urine mg/dL 18.0 mg/dL   Routine UA with microscopic    Collection Time: 02/09/24  6:46 AM   Result Value Ref Range    Color Urine Light Yellow Colorless, Straw, Light Yellow, Yellow    Appearance Urine Clear Clear    Glucose Urine Negative Negative mg/dL    Bilirubin Urine Negative Negative    Ketones Urine Negative Negative mg/dL    Specific Gravity Urine 1.004 1.003 - 1.035    Blood Urine Negative Negative    pH Urine 6.5 5.0 - 7.0    Protein Albumin Urine Negative Negative mg/dL    Urobilinogen Urine Normal Normal, 2.0 mg/dL     Nitrite Urine Negative Negative    Leukocyte Esterase Urine Negative Negative    Bacteria Urine Few (A) None Seen /HPF    RBC Urine 0 <=2 /HPF    WBC Urine 1 <=5 /HPF    Squamous Epithelials Urine 1 <=1 /HPF   CBC with platelets    Collection Time: 02/09/24  6:46 AM   Result Value Ref Range    WBC Count 3.7 (L) 4.0 - 11.0 10e3/uL    RBC Count 4.83 3.80 - 5.20 10e6/uL    Hemoglobin 12.9 11.7 - 15.7 g/dL    Hematocrit 40.7 35.0 - 47.0 %    MCV 84 78 - 100 fL    MCH 26.7 26.5 - 33.0 pg    MCHC 31.7 31.5 - 36.5 g/dL    RDW 12.2 10.0 - 15.0 %    Platelet Count 262 150 - 450 10e3/uL   Partial thromboplastin time    Collection Time: 02/09/24  6:46 AM   Result Value Ref Range    aPTT 28 22 - 38 Seconds   INR    Collection Time: 02/09/24  6:46 AM   Result Value Ref Range    INR 0.95 0.85 - 1.15   Phosphorus    Collection Time: 02/09/24  6:46 AM   Result Value Ref Range    Phosphorus 3.5 2.5 - 4.5 mg/dL   Uric acid    Collection Time: 02/09/24  6:46 AM   Result Value Ref Range    Uric Acid 3.8 2.4 - 5.7 mg/dL   Lipid Profile    Collection Time: 02/09/24  6:46 AM   Result Value Ref Range    Cholesterol 154 <200 mg/dL    Triglycerides 52 <150 mg/dL    Direct Measure HDL 80 >=50 mg/dL    LDL Cholesterol Calculated 64 <=100 mg/dL    Non HDL Cholesterol 74 <130 mg/dL    Patient Fasting > 8hrs? Yes    Comprehensive metabolic panel    Collection Time: 02/09/24  6:46 AM   Result Value Ref Range    Sodium 140 135 - 145 mmol/L    Potassium 3.5 3.4 - 5.3 mmol/L    Carbon Dioxide (CO2) 26 22 - 29 mmol/L    Anion Gap 10 7 - 15 mmol/L    Urea Nitrogen 12.8 6.0 - 20.0 mg/dL    Creatinine 0.75 0.51 - 0.95 mg/dL    GFR Estimate >90 >60 mL/min/1.73m2    Calcium 9.8 8.6 - 10.0 mg/dL    Chloride 104 98 - 107 mmol/L    Glucose 86 70 - 99 mg/dL    Alkaline Phosphatase 58 40 - 150 U/L    AST 31 0 - 45 U/L    ALT 37 0 - 50 U/L    Protein Total 7.9 6.4 - 8.3 g/dL    Albumin 4.6 3.5 - 5.2 g/dL    Bilirubin Total 0.5 <=1.2 mg/dL   Adult Type and Screen     Collection Time: 02/09/24  6:46 AM   Result Value Ref Range    ABO/RH(D) O POS     Antibody Screen Negative Negative    SPECIMEN EXPIRATION DATE [id number]    ABO and Rh 2nd type and screen required    Collection Time: 02/09/24  7:21 AM   Result Value Ref Range    ABO/RH(D) O POS     SPECIMEN EXPIRATION DATE [id number]             Again, thank you for allowing me to participate in the care of your patient.        Sincerely,        Danae Sanders MD, MD

## 2024-02-09 NOTE — PROGRESS NOTES
Psychosocial Evaluation  Living Organ Donation per OPTN Policy 14.1.A  Organ Type: Kidney   Presenting Information:  Virginia presents to the [facility] to complete a psychosocial evaluation since she is interested in becoming a kidney donor for her friend.    PERSONAL BACKGROUND:  Current Living Situation: Virginia is living in [place], [place] with her  and their 2 children.     Education/Employment/Financial Situation: Virginia is a . 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 Her  Harley is a pharmacist who works for a healthcare company. Virginia would be interested in donor shield reimbursement.     Health Insurance Status: Insured through [employer].     Family History: Virginia and Renard have been  for 10 years. They have 2 children together- Pantera (15) and Ginette (14). Isamar mother is living and she is estranged from her father. Virginia also has a brother and a half sister (who she is not closely connected to).     General Health: Virginia reports she is generally in good health. She doctors regularly at a [facility] clinic. She does not have a HCD but is agreeable to out NOK policy.     Mental Health: Virginia reports struggling with depression as a teen but shares she has had no major concerns with her mental health since then. The donor denies any past or present treatment for mental health issues, such as anxiety, depression, bipolar disorder, or disorders of thought such as schizophrenia or schizoaffective disorder.  There is no history of personality disorder or eating disorders.  The donor denies any need to see a counselor or therapist at this time.  The donor denies any past suicidal ideation, plans, or past attempts.  The donor denies any use of psychotropic medications at this time or in the past.  The donor denies any past history of hospitalization for psychiatric illnesses.  The donor  "denies any past history of ADHD or ADD.  The donor denies any history of educational issues or need for special educational services in their past history.     Alcohol and Drug Use/Abuse/Dependency: The donor denies any past history of abuse or dependency on alcohol or illicit drugs. The donor denies any current use of street drugs, including marijuana, vaping, edible marijuana, or other mood altering substances.  The donor denies any past history of negative consequences of use of alcohol or drugs such as a DUI, relationship problems, problems with fulfilling parenting or other care giving responsibilities, or problems with work performance.       Cigarette Use: None reported.     Legal: None reported.     Coping with major surgery/associated stress: Hiking and relaxing    Support System:  Renard who works from home and her mother.     DONOR SPECIFIC INFORMATION:  Relationship to Recipient: Friend of recipient.     Decision Process/Motivation to Donate: Virginia reports she regularly connects with her friend/his wife and she knew \"she had to at least try\" to pursue donation on his behalf. Virginia reports she has thought about all post donation outcomes and still feels that this is the right thing to do.     High risk behaviors as defined by [place] Public Health Services (PHS) that have potential to increase risk of disease transmission were reviewed and no high risk behaviors were indetified.     PREPARATION FOR DONATION, RECOVERY, AND POTENTIAL SHORT-LONG-TERM OUTCOMES:  Understanding of the Living Donation Process:  We discussed the role of living donor .  Short and long term medical and psychosocial risks to both, donor and recipient were reviewed and Virginia indicated understanding.  Post surgical restrictions (2 weeks no driving, 6 weeks no lifting over 10 lbs) were reviewed and she appears capable of adhering to the post surgical requirements. The need for a caregiver was discussed and she has a " good caregiver plan .  The risk of poor psychosocial outcome including problems with body image, post-surgery depression or anxiety, or feelings of emotional distress or bereavement if recipient experiences any recurrent disease, poor outcome or death was reviewed.  Additionally, potential financial implications, including the risk of having difficulty obtaining health care insurance, life insurance, disability insurance, or long term care insurance were reviewed, as were available donor grants to assist with donor related expenses.      We also discussed some unique issues that arise with paired kidney donation, which include the uncertainty of the timing and the importance of having a employment situation and support system that is able to provide sustained support and flexibility.    Virginia appears capable of understanding this information and making an informed medical decision.    Impressions/Recommendations:   Virginia is highly motivated to donate a kidney to her friend. Her decision to donate is free of inducement, coercion, or other undue pressure. Her housing, finances and employment are stable.  No current/active mental health or chemical abuse issues were identified.  The need for a caregiver was reviewed and she is able to identify a plan to meet her post operative care needs. Virginia appears capable of making an informed medical decision.  No psychosocial contraindications to living organ donation were identified and  I support Miriam s desire to donate a kidney to her friend.         Contact Information:   DAINA Ledezma, [facility]  SOT/BMT/CF Float      Time Spent: 30 minutes

## 2024-02-09 NOTE — LETTER
2/9/2024         RE: Virginia Wood  [address]  [facility] [address]        Dear Colleague,    Thank you for referring your patient, Virginia Wood, to the [facility]. Please see a copy of my visit note below.    Psychosocial Evaluation  Living Organ Donation per OPTN Policy 14.1.A  Organ Type: Kidney   Presenting Information:  Virginia presents to the [facility] to complete a psychosocial evaluation since she is interested in becoming a kidney donor for her friend.    PERSONAL BACKGROUND:  Current Living Situation: Virginia is living in [place], [place] with her  and their 2 children.     Education/Employment/Financial Situation: Virginia is a . 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 Her  Harley is a pharmacist who works for a healthcare company. Virginia would be interested in donor shield reimbursement.     Health Insurance Status: Insured through [employer].     Family History: Virginia and Renard have been  for 10 years. They have 2 children together- Pantera (15) and Ginette (14). Isamar mother is living and she is estranged from her father. Virginia also has a brother and a half sister (who she is not closely connected to).     General Health: Virginia reports she is generally in good health. She doctors regularly at a [facility] clinic. She does not have a HCD but is agreeable to out NOK policy.     Mental Health: Virginia reports struggling with depression as a teen but shares she has had no major concerns with her mental health since then. The donor denies any past or present treatment for mental health issues, such as anxiety, depression, bipolar disorder, or disorders of thought such as schizophrenia or schizoaffective disorder.  There is no history of personality disorder or eating disorders.  The donor denies any need to see a counselor  "or therapist at this time.  The donor denies any past suicidal ideation, plans, or past attempts.  The donor denies any use of psychotropic medications at this time or in the past.  The donor denies any past history of hospitalization for psychiatric illnesses.  The donor denies any past history of ADHD or ADD.  The donor denies any history of educational issues or need for special educational services in their past history.     Alcohol and Drug Use/Abuse/Dependency: The donor denies any past history of abuse or dependency on alcohol or illicit drugs. The donor denies any current use of street drugs, including marijuana, vaping, edible marijuana, or other mood altering substances.  The donor denies any past history of negative consequences of use of alcohol or drugs such as a DUI, relationship problems, problems with fulfilling parenting or other care giving responsibilities, or problems with work performance.       Cigarette Use: None reported.     Legal: None reported.     Coping with major surgery/associated stress: Hiking and relaxing    Support System:  Renard who works from home and her mother.     DONOR SPECIFIC INFORMATION:  Relationship to Recipient: Friend of recipient.     Decision Process/Motivation to Donate: Virginia reports she regularly connects with her friend/his wife and she knew \"she had to at least try\" to pursue donation on his behalf. Virginia reports she has thought about all post donation outcomes and still feels that this is the right thing to do.     High risk behaviors as defined by [place] Public Health Services (PHS) that have potential to increase risk of disease transmission were reviewed and no high risk behaviors were indetified.     PREPARATION FOR DONATION, RECOVERY, AND POTENTIAL SHORT-LONG-TERM OUTCOMES:  Understanding of the Living Donation Process:  We discussed the role of living donor .  Short and long term medical and psychosocial risks to both, donor and " recipient were reviewed and Virginia indicated understanding.  Post surgical restrictions (2 weeks no driving, 6 weeks no lifting over 10 lbs) were reviewed and she appears capable of adhering to the post surgical requirements. The need for a caregiver was discussed and she has a good caregiver plan .  The risk of poor psychosocial outcome including problems with body image, post-surgery depression or anxiety, or feelings of emotional distress or bereavement if recipient experiences any recurrent disease, poor outcome or death was reviewed.  Additionally, potential financial implications, including the risk of having difficulty obtaining health care insurance, life insurance, disability insurance, or long term care insurance were reviewed, as were available donor grants to assist with donor related expenses.      We also discussed some unique issues that arise with paired kidney donation, which include the uncertainty of the timing and the importance of having a employment situation and support system that is able to provide sustained support and flexibility.    Virginia appears capable of understanding this information and making an informed medical decision.    Impressions/Recommendations:   Virginia is highly motivated to donate a kidney to her friend. Her decision to donate is free of inducement, coercion, or other undue pressure. Her housing, finances and employment are stable.  No current/active mental health or chemical abuse issues were identified.  The need for a caregiver was reviewed and she is able to identify a plan to meet her post operative care needs. Virginia appears capable of making an informed medical decision.  No psychosocial contraindications to living organ donation were identified and  I support Miriam s desire to donate a kidney to her friend.         Contact Information:   DAINA Ledezma, [facility]  SOT/BMT/CF Float      Time Spent: 30 minutes      Again, thank you for allowing  me to participate in the care of your patient.        Sincerely,        DAINA Ledezma

## 2024-02-09 NOTE — LETTER
2/9/2024       RE: Virginia Wood  [address]  [place] [address]     Dear Colleague,    Thank you for referring your patient, Virginia Wood, to the [facility] TRANSPLANT [facility] at [facility]. Please see a copy of my visit note below.    TRANSPLANT NEPHROLOGY DONOR EVALUATION    Assessment and Plan:  # Prospective Kidney Transplant Donor: Patient with one issue that needs to be addressed prior to donation. Patient's blood pressure is acceptable at this visit, kidney function appears to be acceptable with Iohexol pending, and urinalysis is bland.    # Low White Blood Cell Count: Patient with mildly low WBC at 3.7.  Previous WBC were normal, although last checked many years early.  She did have a recent viral infection, which might be contributing.   - Recommend repeating WBC.    Discussed the risks of donating a kidney, including the surgical risk and the possible risks of living with one kidney.    Education about expected post-donation kidney function and how chronic kidney disease (CKD) and end stage kidney disease (ESKD) might potentially impact the donor in the future, include, but not limited to:       - On average, donors will have 25-35% permanent loss of kidney function at donation.       - Baseline risk of ESKD may slightly exceed that of members of the general        population with the same demographic profile.       - Donor risks must be interpreted in light of known epidemiology of both CKD or         ESKD, such as that CKD generally develops in midlife (40-50 years old) and ESKD         generally develops after age 60.       - Medical evaluation of young potential donors cannot predict lifetime risk of CKD or         ESKD.       - Donors may be at higher risk for CKD if they sustain damage to the remaining         kidney.       - Development of CKD and progression of ESKD may be more rapid with only 1          kidney.       - Some type of kidney replacement therapy, either kidney transplant or dialysis, is         required when reaching ESKD.    Potential medical or surgical risks include, but not limited to:       - Death.       - Scars, pain, fatigue, and other consequences typical of any surgical procedure.       - Decreased kidney function.       - Abdominal or bowel symptoms, such as bloating and nausea, and developing         bowel obstruction.       - Kidney failure (ESKD) and the need for a kidney transplant or dialysis for the donor.       - Impact of obesity, hypertension, or other donor-specific medical conditions on         morbidity and mortality of the potential donor.    Patients overall evaluation will be discussed with the transplant team and a final recommendation on the patients' suitability to be a kidney transplant donor will be made at that time.    Consult:  Virginia Wood was seen in consultation at the request of Dr. Danae Sanders for evaluation as a potential kidney transplant donor.    Reason for Visit:  Virginia Wood is a 38 year old female who presents for a kidney donor evaluation.  Patient would like to donate to Josesito Gates, a friend of hers .    Present Condition and Donor-Related Medical History:    Energy level is good and has been normal.  She is active and does get some exercise.  Denies any chest pain or shortness of breath with exertion.  Appetite is good and no recent weight change.  No nausea, vomiting or diarrhea.  No fever, sweats or chills.  No leg swelling.  No pain or burning with urination.    She does have a h/o dysmenorrhea with endometriosis, s/p hysterectomy, bilateral salpingectomy and left oophorectomy.  In addition, patient developed painful right ovarian cyst and is s/p right oophorectomy.  During that surgery, the report talks about pealing the right ovary off the right ureter.         Kidney Disease Hx:       h/o Kidney Problems: No   Family  h/o Genetic Kidney Disease: No       h/o Hypertension: No      Usual Blood Pressure:  100/60s       h/o Protein in Urine: No    h/o Blood in Urine: No       h/o Kidney Stones: No     h/o Kidney Injury: No       h/o Recurrent UTI: No   h/o Genitourinary Problems: Yes: h/o dysmenorrhea, endometriosis and ovarian cyst, s/p surgery for these       h/o Chronic NSAID Use: No         Other Medical Hx:       h/o Diabetes: No             h/o Gastrointestinal, Pancreas or Liver Problems: No       h/o Lung or Heart Problems: No       h/o Hematologic Problems: No  h/o Bleeding or Clotting Problems: No       h/o Cancer: No       h/o Infection Problems: No       H/o Gestational DM: No      H/o Gestational HTN: Yes     H/o Preeclampsia: No         Skin Cancer Risk:       h/o more than 50 moles: No       h/o extensive sun exposure: No       h/o melanoma: No       Family h/o melanoma: No         Mental Health Assessment:       h/o Depression: Yes: When she was young, but no issues lately.       h/o Psychiatric Illness: No       h/o Suicidal Attempt(s): No    COVID Status:  Vaccination Up To Date: No  H/o COVID Infection: No     Review Of Systems:   A comprehensive review of systems was obtained and negative, except as noted in the HPI or PMH.    Past Medical History:   History was taken from the patient as noted below.  Past Medical History:   Diagnosis Date     Depressive disorder     Teen/early twenties     Dysmenorrhea      Endometriosis     treated in past     Fibroid uterus     treated in past     Hormone replacement therapy     post hysterectomy     Ovarian abscess     left tuboovarian abscess, s/p hysterectomy, bilateral salpingectomy and left oophorectomy     Ovarian cyst     right ovarian cyst, s/p right oophorectomy     Polycystic ovaries     treated in past     PONV (postoperative nausea and vomiting)      Urinary tract infection        Past Social History:   Past Surgical History:   Procedure Laterality Date     ABDOMEN  SURGERY  May 2013 & Sept 2018    Left oophorectomy & hyst in 2013. Right oophorectomy in 2018     CYSTOSCOPY N/A 09/10/2018    Procedure: CYSTOSCOPY;;  Surgeon: Marcus Burks MD;  Location: [facility] OR     HYSTERECTOMY, PAP NO LONGER INDICATED       LAPAROSCOPIC HYSTERECTOMY TOTAL, BILATERAL SALPINGO-OOPHORECTOMY, COMBINED  05/20/2013    Procedure: COMBINED LAPAROSCOPIC HYSTERECTOMY TOTAL, SALPINGO-OOPHORECTOMY;  Laparoscopic total hysterectomy & bilateral salpingectomy & left oopherectomy & cystoscopy;  Surgeon: Marcus Burks MD;  Location: [facility] OR     LAPAROSCOPIC OOPHORECTOMY Right 09/10/2018    Procedure: LAPAROSCOPIC OOPHORECTOMY;  Laparoscopic Right Oophorectomy & Cystoscopy & Retroperitoneal Exploration & Biopsy of Endometriosis;  Surgeon: Marcus Burks MD;  Location: [facility] OR     WISDOM TOOTH EXTRACTION  06/2007     Personal or family history of anesthesia problems: Yes: Post operative nausea and vomiting    Family History:   Family History   Problem Relation Age of Onset     Depression Mother      Unknown/Adopted Father      Substance Abuse Brother         alcohol     Anxiety Disorder Brother      Lipids Maternal Grandmother      Heart Disease Maternal Grandmother      Eye Disorder Maternal Grandmother         macular degeneration     Diabetes Maternal Grandmother      Hypertension Maternal Grandmother      Hyperlipidemia Maternal Grandmother      Kidney Cancer Maternal Grandmother      Alcohol/Drug Maternal Grandfather         alcohol     Depression Maternal Grandfather      Colon Cancer Maternal Grandfather      Hypertension Maternal Grandfather      Substance Abuse Maternal Grandfather         alcohol     Skin Cancer Maternal Grandfather      Asthma No family hx of      Breast Cancer No family hx of      Cancer - colorectal No family hx of      Prostate Cancer No family hx of      Cerebrovascular Disease No family hx of           Specific Family History in First Degree Relatives:       FH of  Kidney Dz: No  FH of Diabetes: No       FH of Hypertension: No FH of CAD: No       FH of Cancer: No  FH of Kidney Cancer: No    Personal History:   Social History     Socioeconomic History     Marital status:      Spouse name: Not on file     Number of children: 2     Years of education: Not on file     Highest education level: Not on file   Occupational History     Employer: [employer]   Tobacco Use     Smoking status: Former     Packs/day: 1.00     Years: 3.00     Additional pack years: 0.00     Total pack years: 3.00     Types: Cigarettes     Quit date: 2008     Years since quittin.0     Smokeless tobacco: Never     Tobacco comments:     occasional smoker   Vaping Use     Vaping Use: Never used   Substance and Sexual Activity     Alcohol use: Not Currently     Drug use: Never     Sexual activity: Yes     Partners: Male     Birth control/protection: None     Comment:  had vasectomy   Other Topics Concern     Parent/sibling w/ CABG, MI or angioplasty before 65F 55M? No   Social History Narrative    [employer] [facility]  since 2013     Social Determinants of Health     Financial Resource Strain: Low Risk  (2024)    Financial Resource Strain      Within the past 12 months, have you or your family members you live with been unable to get utilities (heat, electricity) when it was really needed?: No   Food Insecurity: Low Risk  (2024)    Food Insecurity      Within the past 12 months, did you worry that your food would run out before you got money to buy more?: No      Within the past 12 months, did the food you bought just not last and you didn t have money to get more?: No   Transportation Needs: Low Risk  (2024)    Transportation Needs      Within the past 12 months, has lack of transportation kept you from medical appointments, getting your medicines, non-medical meetings or appointments, work, or from getting things that you need?: No   Physical Activity:  Sufficiently Active (2/14/2024)    Exercise Vital Sign      Days of Exercise per Week: 5 days      Minutes of Exercise per Session: 40 min   Stress: No Stress Concern Present (2/14/2024)    [employer] [place] [employer] - Occupational Stress Questionnaire      Feeling of Stress : Not at all   Social Connections: Unknown (2/14/2024)    Social Connection and Isolation Panel [NHANES]      Frequency of Communication with Friends and Family: Not on file      Frequency of Social Gatherings with Friends and Family: Once a week      Attends Jainism Services: Not on file      Active Member of Clubs or Organizations: Not on file      Attends Club or Organization Meetings: Not on file      Marital Status: Not on file   Interpersonal Safety: Not on file   Housing Stability: Low Risk  (2/14/2024)    Housing Stability      Do you have housing? : Yes      Are you worried about losing your housing?: No          Specific Social History:       Health Insurance Status: Yes       Employment Status: Full time  Occupation:                        Living Arrangements: lives with their spouse       Social Support: Yes       Presence of increased risk for disease transmission behaviors as defined by PHS guidelines: No        Allergies:  Allergies   Allergen Reactions     Nkda [No Known Drug Allergy]        Medications:  Current Outpatient Medications   Medication Sig     estradiol (ESTRACE) 2 MG tablet Take 0.5 tablets (1 mg) by mouth daily     Multiple Vitamins-Minerals (DAILY MULTI PO) Take  by mouth.     Multiple Vitamins-Minerals (IMMUNE SUPPORT PO) Immune Support     UNABLE TO FIND Take 1,000 mg by mouth daily MEDICATION NAME: Salomon     No current facility-administered medications for this visit.     Medications Discontinued During This Encounter   Medication Reason     ibuprofen (ADVIL/MOTRIN) 200 MG tablet      ondansetron (ZOFRAN) 4 MG tablet      tretinoin (RETIN-A) 0.025 % external cream       scopolamine (TRANSDERM) 1 MG/3DAYS 72 hr patch      TYLENOL 325 MG OR TABS      estradiol (ESTRACE) 2 MG tablet          Vitals:      2/9/2024     8:00 AM 2/9/2024     8:01 AM 2/9/2024     8:03 AM   Vital Signs   Systolic 99 106 103   Diastolic 65 68 67       Exam:   GENERAL APPEARANCE: alert and no distress  HENT: mouth without ulcers or lesions  RESP: lungs clear to auscultation - no rales, rhonchi or wheezes  CV: regular rhythm, normal rate, no rub, no murmur  EDEMA: no LE edema bilaterally  ABDOMEN: soft, nondistended, nontender, bowel sounds normal  MS: extremities normal - no gross deformities noted, no evidence of inflammation in joints, no muscle tenderness  SKIN: no rash    Results:   Labs and imaging were ordered for this visit and reviewed by me.  Recent Results (from the past 336 hour(s))   EBV Capsid Antibody IgM    Collection Time: 02/09/24  6:46 AM   Result Value Ref Range    EBV Capsid Abi IgM Instrument Value <10.0 <36.0 U/mL    EBV Capsid Antibody IgM No detectable antibody. No detectable antibody.   EBV Capsid Antibody IgG    Collection Time: 02/09/24  6:46 AM   Result Value Ref Range    EBV Capsid Abi IgG Instrument Value 597.0 (H) <18.0 U/mL    EBV Capsid Antibody IgG Positive (A) No detectable antibody.   CMV Antibody IgG    Collection Time: 02/09/24  6:46 AM   Result Value Ref Range    CMV Abi IgG Instrument Value <0.20 <0.60 U/mL    CMV Antibody IgG No detectable antibody. No detectable antibody.    Trypanosoma Cruzi    Collection Time: 02/09/24  6:46 AM   Result Value Ref Range    Trypanosoma Cruzi Non-reactive    Strongyloides antibody IgG    Collection Time: 02/09/24  6:46 AM   Result Value Ref Range    Strongyloides Abi IgG 0.1 <=0.9 IV   West Nile Virus Antibody IgG IgM    Collection Time: 02/09/24  6:46 AM   Result Value Ref Range    West Nile IgG Serum 0.10 <=1.29 IV    West Nile IgM Serum 0.01 <=0.89 IV   Treponema Abs w Reflex to RPR and Titer    Collection Time: 02/09/24  6:46 AM    Result Value Ref Range    Treponema Antibody Total Nonreactive Nonreactive   HIV Antigen Antibody Combo Pretransplant Cascade    Collection Time: 02/09/24  6:46 AM   Result Value Ref Range    HIV Antigen Antibody Combo Pretransplant Nonreactive Nonreactive   Hepatitis C antibody    Collection Time: 02/09/24  6:46 AM   Result Value Ref Range    Hepatitis C Antibody Nonreactive Nonreactive   Hepatitis B surface antigen    Collection Time: 02/09/24  6:46 AM   Result Value Ref Range    Hepatitis B Surface Antigen Nonreactive Nonreactive   Hepatitis B Surface Antibody    Collection Time: 02/09/24  6:46 AM   Result Value Ref Range    Hepatitis B Surface Antibody Nonreactive     Hepatitis B Surface Antibody Instrument Value 4.07 <8.5 m[IU]/mL   Hepatitis B core antibody    Collection Time: 02/09/24  6:46 AM   Result Value Ref Range    Hepatitis B Core Antibody Total Nonreactive Nonreactive   Protein  random urine    Collection Time: 02/09/24  6:46 AM   Result Value Ref Range    Total Protein Urine mg/dL <6.0   mg/dL    Total Protein Urine mg/mg Creat      Creatinine Urine mg/dL 18.0 mg/dL   Albumin Random Urine Quantitative with Creat Ratio    Collection Time: 02/09/24  6:46 AM   Result Value Ref Range    Creatinine Urine mg/dL 17.5 mg/dL    Albumin Urine mg/L <12.0 mg/L    Albumin Urine mg/g Cr     Routine UA with microscopic    Collection Time: 02/09/24  6:46 AM   Result Value Ref Range    Color Urine Light Yellow Colorless, Straw, Light Yellow, Yellow    Appearance Urine Clear Clear    Glucose Urine Negative Negative mg/dL    Bilirubin Urine Negative Negative    Ketones Urine Negative Negative mg/dL    Specific Gravity Urine 1.004 1.003 - 1.035    Blood Urine Negative Negative    pH Urine 6.5 5.0 - 7.0    Protein Albumin Urine Negative Negative mg/dL    Urobilinogen Urine Normal Normal, 2.0 mg/dL    Nitrite Urine Negative Negative    Leukocyte Esterase Urine Negative Negative    Bacteria Urine Few (A) None Seen /HPF     RBC Urine 0 <=2 /HPF    WBC Urine 1 <=5 /HPF    Squamous Epithelials Urine 1 <=1 /HPF   CBC with platelets    Collection Time: 02/09/24  6:46 AM   Result Value Ref Range    WBC Count 3.7 (L) 4.0 - 11.0 10e3/uL    RBC Count 4.83 3.80 - 5.20 10e6/uL    Hemoglobin 12.9 11.7 - 15.7 g/dL    Hematocrit 40.7 35.0 - 47.0 %    MCV 84 78 - 100 fL    MCH 26.7 26.5 - 33.0 pg    MCHC 31.7 31.5 - 36.5 g/dL    RDW 12.2 10.0 - 15.0 %    Platelet Count 262 150 - 450 10e3/uL   Partial thromboplastin time    Collection Time: 02/09/24  6:46 AM   Result Value Ref Range    aPTT 28 22 - 38 Seconds   INR    Collection Time: 02/09/24  6:46 AM   Result Value Ref Range    INR 0.95 0.85 - 1.15   Hemoglobin A1c    Collection Time: 02/09/24  6:46 AM   Result Value Ref Range    Hemoglobin A1C 5.4 <5.7 %   Phosphorus    Collection Time: 02/09/24  6:46 AM   Result Value Ref Range    Phosphorus 3.5 2.5 - 4.5 mg/dL   Uric acid    Collection Time: 02/09/24  6:46 AM   Result Value Ref Range    Uric Acid 3.8 2.4 - 5.7 mg/dL   Lipid Profile    Collection Time: 02/09/24  6:46 AM   Result Value Ref Range    Cholesterol 154 <200 mg/dL    Triglycerides 52 <150 mg/dL    Direct Measure HDL 80 >=50 mg/dL    LDL Cholesterol Calculated 64 <=100 mg/dL    Non HDL Cholesterol 74 <130 mg/dL    Patient Fasting > 8hrs? Yes    Comprehensive metabolic panel    Collection Time: 02/09/24  6:46 AM   Result Value Ref Range    Sodium 140 135 - 145 mmol/L    Potassium 3.5 3.4 - 5.3 mmol/L    Carbon Dioxide (CO2) 26 22 - 29 mmol/L    Anion Gap 10 7 - 15 mmol/L    Urea Nitrogen 12.8 6.0 - 20.0 mg/dL    Creatinine 0.75 0.51 - 0.95 mg/dL    GFR Estimate >90 >60 mL/min/1.73m2    Calcium 9.8 8.6 - 10.0 mg/dL    Chloride 104 98 - 107 mmol/L    Glucose 86 70 - 99 mg/dL    Alkaline Phosphatase 58 40 - 150 U/L    AST 31 0 - 45 U/L    ALT 37 0 - 50 U/L    Protein Total 7.9 6.4 - 8.3 g/dL    Albumin 4.6 3.5 - 5.2 g/dL    Bilirubin Total 0.5 <=1.2 mg/dL   Quantiferon TB Gold Plus Grey  Tube    Collection Time: 02/09/24  6:46 AM    Specimen: Peripheral IV; Blood   Result Value Ref Range    Quantiferon Nil Tube 0.01 IU/mL   Quantiferon TB Gold Plus Green Tube    Collection Time: 02/09/24  6:46 AM    Specimen: Peripheral IV; Blood   Result Value Ref Range    Quantiferon TB1 Tube 0.01 IU/mL   Quantiferon TB Gold Plus Yellow Tube    Collection Time: 02/09/24  6:46 AM    Specimen: Peripheral IV; Blood   Result Value Ref Range    Quantiferon TB2 Tube 0.02    Quantiferon TB Gold Plus Purple Tube    Collection Time: 02/09/24  6:46 AM    Specimen: Peripheral IV; Blood   Result Value Ref Range    Quantiferon Mitogen 10.00 IU/mL   Adult Type and Screen    Collection Time: 02/09/24  6:46 AM   Result Value Ref Range    ABO/RH(D) O POS     Antibody Screen Negative Negative    SPECIMEN EXPIRATION DATE [id number]    Quantiferon TB Gold Plus    Collection Time: 02/09/24  6:46 AM    Specimen: Peripheral IV; Blood   Result Value Ref Range    Quantiferon-TB Gold Plus Negative Negative    TB1 Ag minus Nil Value 0.00 IU/mL    TB2 Ag minus Nil Value 0.01 IU/mL    Mitogen minus Nil Result 9.99 IU/mL    Nil Result 0.01 IU/mL   ABO and Rh 2nd type and screen required    Collection Time: 02/09/24  7:21 AM   Result Value Ref Range    ABO/RH(D) O POS     SPECIMEN EXPIRATION DATE [id number]    HLA-ABC Typing High Resolution    Collection Time: 02/09/24  8:20 AM   Result Value Ref Range    ABTEST METHOD NGS     hiresA-1 A*02:01     hiresA-1Equiv 2     hiresA-2 A*11:01     hiresA-2Equiv 11     hiresB-1 B*07:02     hiresB-1Equiv 7     hiresB-2 B*51:01     hiresB-2Equiv 51     hiresC-1 C*07:02     hiresC-1Equiv 7     hiresC-2 C*15:02     hiresC-2Equiv 15     hiresBw-1 Bw*4     hiresBw-2 Bw*6    HLA-DR Typing High Resolution    Collection Time: 02/09/24  8:20 AM   Result Value Ref Range    DRhiresTestM NGS     hiresDPA1-1 DPA1*01:03     hiresDPB1-1 DPB1*04:01     hiresDPB1-1N [facility] 04:01/126:01     hiresDPB1-2 DPB1*04:02      hiresDPB1-2N [facility] 04:02/105:01     hiresDQA1-1 DQA1*01:01     hiresDQA1-2 DQA1*01:02     hiresDQB1-1 DQB1*05:01     hiresDQB1-1Equiv 5     hiresDQB1-2 DQB1*06:02     hiresDQB1-2Equiv 6     hiresDRB1-1 DRB1*01:01     hiresDRB1-1Equiv 1     hiresDRB1-2 DRB1*15:01     hiresDRB1-2Equiv 15     hiresDRB5-1 DRB5*01:01     hiresDRB5-1Equiv 51    Iohexol 1st Order    Collection Time: 02/09/24  9:35 AM   Result Value Ref Range    Iohexol Time 1 6.46 mg/dL    Iohexol Time 2 2.57 mg/dL    Iohexol Body Surface Area 1.876 m2    Iohexol Raw Clearance 115 mL/min    Iohexol Std Clearance 106 /1.73 m2   EKG 12-lead complete w/read - Clinics    Collection Time: 02/09/24  1:12 PM   Result Value Ref Range    Systolic Blood Pressure  mmHg    Diastolic Blood Pressure  mmHg    Ventricular Rate 60 BPM    Atrial Rate 60 BPM    KS Interval 176 ms    QRS Duration 80 ms     ms    QTc 408 ms    P Axis -1 degrees    R AXIS 78 degrees    T Axis 41 degrees    Interpretation ECG       Sinus rhythm  Normal ECG  No previous ECGs available  Confirmed by MD RACQUEL, DAVONTE ([id number]) on 2/12/2024 9:51:00 AM               Again, thank you for allowing me to participate in the care of your patient.      Sincerely,    Toribio Cortes MD

## 2024-02-10 LAB
GAMMA INTERFERON BACKGROUND BLD IA-ACNC: 0.01 IU/ML
M TB IFN-G BLD-IMP: NEGATIVE
M TB IFN-G CD4+ BCKGRND COR BLD-ACNC: 9.99 IU/ML
MITOGEN IGNF BCKGRD COR BLD-ACNC: 0 IU/ML
MITOGEN IGNF BCKGRD COR BLD-ACNC: 0.01 IU/ML
QUANTIFERON MITOGEN: 10 IU/ML
QUANTIFERON NIL TUBE: 0.01 IU/ML
QUANTIFERON TB1 TUBE: 0.01 IU/ML
QUANTIFERON TB2 TUBE: 0.02

## 2024-02-11 LAB — STRONGYLOIDES IGG SER IA-ACNC: 0.1 IV

## 2024-02-12 LAB
ATRIAL RATE - MUSE: 60 BPM
DIASTOLIC BLOOD PRESSURE - MUSE: NORMAL MMHG
INTERPRETATION ECG - MUSE: NORMAL
P AXIS - MUSE: -1 DEGREES
PR INTERVAL - MUSE: 176 MS
QRS DURATION - MUSE: 80 MS
QT - MUSE: 408 MS
QTC - MUSE: 408 MS
R AXIS - MUSE: 78 DEGREES
SYSTOLIC BLOOD PRESSURE - MUSE: NORMAL MMHG
T AXIS - MUSE: 41 DEGREES
TRYPANOSOMA CRUZI: NORMAL
VENTRICULAR RATE- MUSE: 60 BPM
WNV IGG SER IA-ACNC: 0.1 IV
WNV IGM SER IA-ACNC: 0.01 IV

## 2024-02-13 ENCOUNTER — DOCUMENTATION ONLY (OUTPATIENT)
Dept: TRANSPLANT | Facility: CLINIC | Age: 39
End: 2024-02-13
Payer: COMMERCIAL

## 2024-02-13 LAB
A*: NORMAL
A*LOCUS SEROLOGIC EQUIVALENT: 2
A*LOCUS: NORMAL
A*SEROLOGIC EQUIVALENT: 11
ABTEST METHOD: NORMAL
B*: NORMAL
B*LOCUS SEROLOGIC EQUIVALENT: 7
B*LOCUS: NORMAL
B*SEROLOGIC EQUIVALENT: 51
BSA: 1.88 M2
BW-1: NORMAL
BW-2: NORMAL
C*: NORMAL
C*LOCUS SEROLOGIC EQUIVALENT: 7
C*LOCUS: NORMAL
C*SEROLOGIC EQUIVALENT: 15
DPA1*: NORMAL
DPB1*: NORMAL
DPB1*LOCUS NMDP: NORMAL
DPB1*LOCUS: NORMAL
DPB1*NMDP: NORMAL
DQA1*: NORMAL
DQA1*LOCUS: NORMAL
DQB1*: NORMAL
DQB1*LOCUS SEROLOGIC EQUIVALENT: 5
DQB1*LOCUS: NORMAL
DQB1*SEROLOGIC EQUIVALENT: 6
DRB1*: NORMAL
DRB1*LOCUS SEROLOGIC EQUIVALENT: 1
DRB1*LOCUS: NORMAL
DRB1*SEROLOGIC EQUIVALENT: 15
DRB5*LOCUS SEROLOGIC EQUIVALENT: 51
DRB5*LOCUS: NORMAL
DRSSO TEST METHOD: NORMAL
IOHEXOL CL UR+SERPL-VRATE: 106 /1.73 M2
IOHEXOL CL UR+SERPL-VRATE: 115 ML/MIN
IOHEXOL CL UR+SERPL-VRATE: 2.57 MG/DL
IOHEXOL CL UR+SERPL-VRATE: 6.46 MG/DL

## 2024-02-13 NOTE — PROGRESS NOTES
"Image Review Meeting    ATTENDEES: Dr. Yee    IMAGES REVIEWED: CTA renal from 2/9/24    IMPRESSION:   1. RIGHT KIDNEY:       A. The right kidney contains a single renal artery, single renal  vein, and a single ureter.       B. The right kidney parenchyma is normal. Prominent renal pelvis.  No hydronephrosis..        C. The renal volume is 196.4 cc.     2. LEFT KIDNEY:       A. The left kidney contains a single renal artery, single renal  vein, and a single ureter.       B. The left kidney parenchyma is normal.        C. The renal volume is 170.1 cc.     3. 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 If the patient has symptoms and it's clinically  indicated, celiac artery ultrasound with inspiration and expiration  could be performed for further evaluation.     4. Calcified granulomas. Lung bases. Liver. Spleen.    DECISION:     INCIDENTALS: Yes:   3. 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 If the patient has symptoms and it's clinically  indicated, celiac artery ultrasound with inspiration and expiration  could be performed for further evaluation.-check with committee to see if they should do further testing or not.     4. Calcified granulomas. Lung bases. Liver. Spleen. -no follow up needed      Addendum- Dr. Sanders reviewed CTA renal on 2/20/24-    The right ureter is dilated on CTA even though it's not mentioned in the report and there is a point at which is narrows right where they were for the ovary. I wouldn't do anything about the celiac artery. My comment in the note was because I looked at the CTA already and that why I said \"The R ureter is dilated on the CT urogram so I would recommend she donate this one.\"    DECISION: RIGHT       "

## 2024-02-14 ENCOUNTER — TELEPHONE (OUTPATIENT)
Dept: TRANSPLANT | Facility: CLINIC | Age: 39
End: 2024-02-14
Payer: COMMERCIAL

## 2024-02-14 ENCOUNTER — COMMITTEE REVIEW (OUTPATIENT)
Dept: TRANSPLANT | Facility: CLINIC | Age: 39
End: 2024-02-14
Payer: COMMERCIAL

## 2024-02-14 PROBLEM — Z79.890 HORMONE REPLACEMENT THERAPY: Status: ACTIVE | Noted: 2024-02-14

## 2024-02-14 SDOH — HEALTH STABILITY: PHYSICAL HEALTH: ON AVERAGE, HOW MANY DAYS PER WEEK DO YOU ENGAGE IN MODERATE TO STRENUOUS EXERCISE (LIKE A BRISK WALK)?: 5 DAYS

## 2024-02-14 SDOH — HEALTH STABILITY: PHYSICAL HEALTH: ON AVERAGE, HOW MANY MINUTES DO YOU ENGAGE IN EXERCISE AT THIS LEVEL?: 40 MIN

## 2024-02-14 ASSESSMENT — SOCIAL DETERMINANTS OF HEALTH (SDOH): HOW OFTEN DO YOU GET TOGETHER WITH FRIENDS OR RELATIVES?: ONCE A WEEK

## 2024-02-14 NOTE — TELEPHONE ENCOUNTER
Spoke to [place] regarding committee review results:    Committee Discussion Details:   Needs to repeat WBC  Needs to complete mammogram  Need to review CTA renal (laterality) with Dr. Sanders.   Need to have Dr. 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 If the patient has symptoms and it's clinically indicated, celiac artery ultrasound with inspiration and expiration could be performed for further evaluation.

## 2024-02-14 NOTE — COMMITTEE REVIEW
Living Donor Committee Review Note Evaluation Date: 2/9/2024  Committee Review Date: 2/14/2024    Donor being evaluated for: Kidney    Transplant Phase: Evaluation  Transplant Status: Active    Transplant Coordinator: Sari Hearn  Transplant Surgeon:       Committee Review Members:  Independent Living Donor Advocate Kelly Stearns   Nephrology Pawan Ham MD, Toribio Cortes MD   Nutrition Savannah Wills, MAYITO   Pharmacist Jessa Badlilo, [facility]    - Clinical Neha Murphy, [facility]   Transplant Kanchan Tamika Benoit, RN, Gabriella Marcano, RN, Katelyn Nance, RN, Jessa Artis, ERNESTO, Srai Ying, RN   Transplant Surgery Jeancarlos Chicas MD       Transplant Eligibility: Acceptable Physical Health, Acceptable Mental Health    Committee Review Decision: Needs Re-presentation    Relative Contraindications: None    Absolute Contraindications: None    Committee Chair Jeancarlos Chicas MD verbally attested to the committee's decision.    Committee Discussion Details:   Needs to repeat WBC  Needs to complete mammogram  Need to review CTA renal (laterality) with Dr. Sanders.   Need to have Dr. 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 If the patient has symptoms and it's clinically indicated, celiac artery ultrasound with inspiration and expiration could be performed for further evaluation.

## 2024-02-15 ENCOUNTER — OFFICE VISIT (OUTPATIENT)
Dept: FAMILY MEDICINE | Facility: CLINIC | Age: 39
End: 2024-02-15
Payer: COMMERCIAL

## 2024-02-15 VITALS
DIASTOLIC BLOOD PRESSURE: 60 MMHG | SYSTOLIC BLOOD PRESSURE: 100 MMHG | WEIGHT: 160.6 LBS | BODY MASS INDEX: 25.81 KG/M2 | TEMPERATURE: 97.3 F | HEART RATE: 79 BPM | OXYGEN SATURATION: 98 % | RESPIRATION RATE: 16 BRPM | HEIGHT: 66 IN

## 2024-02-15 DIAGNOSIS — Z00.00 ROUTINE GENERAL MEDICAL EXAMINATION AT A HEALTH CARE FACILITY: Primary | ICD-10-CM

## 2024-02-15 DIAGNOSIS — E89.40 SURGICAL MENOPAUSE: ICD-10-CM

## 2024-02-15 PROCEDURE — 99395 PREV VISIT EST AGE 18-39: CPT | Performed by: NURSE PRACTITIONER

## 2024-02-15 RX ORDER — ESTRADIOL 1 MG/1
1 TABLET ORAL DAILY
Qty: 90 TABLET | Refills: 3 | Status: SHIPPED | OUTPATIENT
Start: 2024-02-15

## 2024-02-15 ASSESSMENT — PAIN SCALES - GENERAL: PAINLEVEL: NO PAIN (0)

## 2024-02-15 NOTE — PATIENT INSTRUCTIONS
Preventive Care Advice   This is general advice given by our system to help you stay healthy. However, your care team may have specific advice just for you. Please talk to your care team about your preventive care needs.  Nutrition  Eat 5 or more servings of fruits and vegetables each day.  Try wheat bread, brown rice and whole grain pasta (instead of white bread, rice, and pasta).  Get enough calcium and vitamin D. Check the label on foods and aim for 100% of the RDA (recommended daily allowance).  Lifestyle  Exercise at least 150 minutes each week  (30 minutes a day, 5 days a week).  Do muscle strengthening activities 2 days a week. These help control your weight and prevent disease.  No smoking.  Wear sunscreen to prevent skin cancer.  Have a dental exam and cleaning every 6 months.  Yearly exams  See your health care team every year to talk about:  Any changes in your health.  Any medicines your care team has prescribed.  Preventive care, family planning, and ways to prevent chronic diseases.  Shots (vaccines)   HPV shots (up to age 26), if you've never had them before.  Hepatitis B shots (up to age 59), if you've never had them before.  COVID-19 shot: Get this shot when it's due.  Flu shot: Get a flu shot every year.  Tetanus shot: Get a tetanus shot every 10 years.  Pneumococcal, hepatitis A, and RSV shots: Ask your care team if you need these based on your risk.  Shingles shot (for age 50 and up)  General health tests  Diabetes screening:  Starting at age 35, Get screened for diabetes at least every 3 years.  If you are younger than age 35, ask your care team if you should be screened for diabetes.  Cholesterol test: At age 39, start having a cholesterol test every 5 years, or more often if advised.  Bone density scan (DEXA): At age 50, ask your care team if you should have this scan for osteoporosis (brittle bones).  Hepatitis C: Get tested at least once in your life.  STIs (sexually transmitted  infections)  Before age 24: Ask your care team if you should be screened for STIs.  After age 24: Get screened for STIs if you're at risk. You are at risk for STIs (including HIV) if:  You are sexually active with more than one person.  You don't use condoms every time.  You or a partner was diagnosed with a sexually transmitted infection.  If you are at risk for HIV, ask about PrEP medicine to prevent HIV.  Get tested for HIV at least once in your life, whether you are at risk for HIV or not.  Cancer screening tests  Cervical cancer screening: If you have a cervix, begin getting regular cervical cancer screening tests starting at age 21.  Breast cancer scan (mammogram): If you've ever had breasts, begin having regular mammograms starting at age 40. This is a scan to check for breast cancer.  Colon cancer screening: It is important to start screening for colon cancer at age 45.  Have a colonoscopy test every 10 years (or more often if you're at risk) Or, ask your provider about stool tests like a FIT test every year or Cologuard test every 3 years.  To learn more about your testing options, visit:   https://www.[employer]/026771.pdf.  For help making a decision, visit:   https://bit.ly/vw38970.  Prostate cancer screening test: If you have a prostate, ask your care team if a prostate cancer screening test (PSA) at age 55 is right for you.  Lung cancer screening: If you are a current or former smoker ages 50 to 80, ask your care team if ongoing lung cancer screenings are right for you.  For informational purposes only. Not to replace the advice of your health care provider. Copyright   2023 [employer]. All rights reserved. Clinically reviewed by the [facility] Transitions Program. [employer] 078376 - REV 01/24.

## 2024-02-15 NOTE — PROGRESS NOTES
"Preventive Care Visit  [facility]  MEGAN Zhou CNP, Nurse Practitioner - Family  Feb 15, 2024    Assessment & Plan     Routine general medical examination at a health care facility  Healthy female exam completed today. Discussed lifestyle modifications including weight loss/ management, eating a balanced diet, and getting regular cardiovascular exercise. Discussed self breast exams and how to complete these. Pap Smear no longer indicated had hysterectomy. Labs reviewed. Plan yearly annual physicals or sooner as needed.    Surgical Menopause  Refill estradiol. Feeling well. Symptoms are well controlled.     BMI  Estimated body mass index is 25.66 kg/m  as calculated from the following:    Height as of this encounter: 1.685 m (5' 6.34\").    Weight as of this encounter: 72.8 kg (160 lb 9.6 oz).       Counseling  Appropriate preventive services were discussed with this patient, including applicable screening as appropriate for fall prevention, nutrition, physical activity, Tobacco-use cessation, weight loss and cognition.  Checklist reviewing preventive services available has been given to the patient.  Reviewed patient's diet, addressing concerns and/or questions.   The patient was instructed to see the dentist every 6 months.     Patient has been advised of split billing requirements and indicates understanding: Yes        Subjective   Virginia is a 38 year old, presenting for the following:  Physical        2/15/2024     8:19 AM   Additional Questions   Roomed by Mala DURON   Accompanied by self        Health Care Directive  Patient does not have a Health Care Directive or Living Will: Discussed advance care planning with patient; however, patient declined at this time.    HPI  Ct results-had some questions. Incidental finding of celiac artery stenosis. She is undergoing living kidney donor process.        2/14/2024   General Health   How would you rate your overall physical health? " Excellent   Feel stress (tense, anxious, or unable to sleep) Not at all         2024   Nutrition   Three or more servings of calcium each day? Yes   Diet: Regular (no restrictions)   How many servings of fruit and vegetables per day? 4 or more   How many sweetened beverages each day? 0-1         2024   Exercise   Days per week of moderate/strenous exercise 5 days   Average minutes spent exercising at this level 40 min         2024   Social Factors   Frequency of gathering with friends or relatives Once a week   Worry food won't last until get money to buy more No   Food not last or not have enough money for food? No   Do you have housing?  Yes   Are you worried about losing your housing? No   Lack of transportation? No   Unable to get utilities (heat,electricity)? No         2024   Dental   Dentist two times every year? (!) NO          No data to display                  Today's PHQ-2 Score:       2024     5:07 PM   PHQ-2 (  [employer])   Q1: Little interest or pleasure in doing things 0   Q2: Feeling down, depressed or hopeless 0   PHQ-2 Score 0   Q1: Little interest or pleasure in doing things Not at all   Q2: Feeling down, depressed or hopeless Not at all   PHQ-2 Score 0           2024   Substance Use   Alcohol more than 3/day or more than 7/wk No   Do you use any other substances recreationally? No     Social History     Tobacco Use    Smoking status: Former     Packs/day: 1.00     Years: 3.00     Additional pack years: 0.00     Total pack years: 3.00     Types: Cigarettes     Quit date: 2008     Years since quittin.0    Smokeless tobacco: Never    Tobacco comments:     occasional smoker   Vaping Use    Vaping Use: Never used   Substance Use Topics    Alcohol use: Not Currently    Drug use: Never           2022   LAST FHS-7 RESULTS   1st degree relative breast or ovarian cancer No   Any relative bilateral breast cancer No   Any male have breast cancer No   Any woman  have breast and ovarian cancer No   Any woman with breast cancer before 50yrs No   2 or more relatives with breast and/or ovarian cancer No   2 or more relatives with breast and/or bowel cancer Yes        Mammogram Screening - Patient under 40 years of age: Routine Mammogram Screening not recommended.         2/14/2024   STI Screening   New sexual partner(s) since last STI/HIV test? No     History of abnormal Pap smear: Status post benign hysterectomy. Health Maintenance and Surgical History updated.        4/23/2013    12:00 AM 12/23/2008    12:00 AM 4/4/2008    12:00 AM   PAP / HPV   PAP (Historical) NIL  NIL  NIL           Reviewed and updated as needed this visit by Provider   Tobacco  Allergies  Meds  Problems  Med Hx  Surg Hx  Fam Hx            Past Medical History:   Diagnosis Date    Depressive disorder     Teen/early twenties    Dysmenorrhea     Endometriosis     treated in past    Fibroid uterus     treated in past    Hormone replacement therapy     post hysterectomy    Ovarian abscess     left tuboovarian abscess, s/p hysterectomy, bilateral salpingectomy and left oophorectomy    Ovarian cyst     right ovarian cyst, s/p right oophorectomy    Polycystic ovaries     treated in past    PONV (postoperative nausea and vomiting)     Urinary tract infection      Past Surgical History:   Procedure Laterality Date    ABDOMEN SURGERY  May 2013 & Sept 2018    Left oophorectomy & hyst in 2013. Right oophorectomy in 2018    CYSTOSCOPY N/A 09/10/2018    Procedure: CYSTOSCOPY;;  Surgeon: Marcus Burks MD;  Location: [facility]    HYSTERECTOMY, PAP NO LONGER INDICATED      LAPAROSCOPIC HYSTERECTOMY TOTAL, BILATERAL SALPINGO-OOPHORECTOMY, COMBINED  05/20/2013    Procedure: COMBINED LAPAROSCOPIC HYSTERECTOMY TOTAL, SALPINGO-OOPHORECTOMY;  Laparoscopic total hysterectomy & bilateral salpingectomy & left oopherectomy & cystoscopy;  Surgeon: Marcus Burks MD;  Location: [facility]    LAPAROSCOPIC OOPHORECTOMY  Right 09/10/2018    Procedure: LAPAROSCOPIC OOPHORECTOMY;  Laparoscopic Right Oophorectomy & Cystoscopy & Retroperitoneal Exploration & Biopsy of Endometriosis;  Surgeon: Marcus Burks MD;  Location: [facility]    WISDOM TOOTH EXTRACTION  2007     BP Readings from Last 3 Encounters:   02/15/24 100/60   24 103/67   24 111/69    Wt Readings from Last 3 Encounters:   02/15/24 72.8 kg (160 lb 9.6 oz)   24 74.4 kg (164 lb)   24 74.5 kg (164 lb 3.2 oz)                  Patient Active Problem List   Diagnosis    Lumbago    Lumbar radicular pain    CARDIOVASCULAR SCREENING; LDL GOAL LESS THAN 160    Endometriosis    Status post left oophorectomy    Status post hysterectomy    Surgical menopause    Transplant donor evaluation    Hormone replacement therapy     Past Surgical History:   Procedure Laterality Date    ABDOMEN SURGERY  May 2013 & 2018    Left oophorectomy & hyst in . Right oophorectomy in     CYSTOSCOPY N/A 09/10/2018    Procedure: CYSTOSCOPY;;  Surgeon: Marcus Burks MD;  Location: [facility]    HYSTERECTOMY, PAP NO LONGER INDICATED      LAPAROSCOPIC HYSTERECTOMY TOTAL, BILATERAL SALPINGO-OOPHORECTOMY, COMBINED  2013    Procedure: COMBINED LAPAROSCOPIC HYSTERECTOMY TOTAL, SALPINGO-OOPHORECTOMY;  Laparoscopic total hysterectomy & bilateral salpingectomy & left oopherectomy & cystoscopy;  Surgeon: Marcus Burks MD;  Location: [facility]    LAPAROSCOPIC OOPHORECTOMY Right 09/10/2018    Procedure: LAPAROSCOPIC OOPHORECTOMY;  Laparoscopic Right Oophorectomy & Cystoscopy & Retroperitoneal Exploration & Biopsy of Endometriosis;  Surgeon: Marcus Burks MD;  Location: [facility]    WISDOM TOOTH EXTRACTION  2007       Social History     Tobacco Use    Smoking status: Former     Packs/day: 1.00     Years: 3.00     Additional pack years: 0.00     Total pack years: 3.00     Types: Cigarettes     Quit date: 2008     Years since quittin.0    Smokeless  tobacco: Never    Tobacco comments:     occasional smoker   Substance Use Topics    Alcohol use: Not Currently     Family History   Problem Relation Age of Onset    Depression Mother     Unknown/Adopted Father     Substance Abuse Brother         alcohol    Anxiety Disorder Brother     Lipids Maternal Grandmother     Heart Disease Maternal Grandmother     Eye Disorder Maternal Grandmother         macular degeneration    Diabetes Maternal Grandmother     Hypertension Maternal Grandmother     Hyperlipidemia Maternal Grandmother     Kidney Cancer Maternal Grandmother     Alcohol/Drug Maternal Grandfather         alcohol    Depression Maternal Grandfather     Colon Cancer Maternal Grandfather     Hypertension Maternal Grandfather     Substance Abuse Maternal Grandfather         alcohol    Skin Cancer Maternal Grandfather     Asthma No family hx of     Breast Cancer No family hx of     Cancer - colorectal No family hx of     Prostate Cancer No family hx of     Cerebrovascular Disease No family hx of          Current Outpatient Medications   Medication Sig Dispense Refill    estradiol (ESTRACE) 2 MG tablet Take 0.5 tablets (1 mg) by mouth daily      Multiple Vitamins-Minerals (DAILY MULTI PO) Take  by mouth.      Multiple Vitamins-Minerals (IMMUNE SUPPORT PO) Immune Support      UNABLE TO FIND Take 1,000 mg by mouth daily MEDICATION NAME: Ezekieljose       Allergies   Allergen Reactions    Nkda [No Known Drug Allergy]      Recent Labs   Lab Test 02/09/24  0646 10/13/20  0857 10/29/19  1005   A1C 5.4  --   --    LDL 64 78 64   HDL 80 83 99   TRIG 52 59 56   ALT 37  --   --    CR 0.75  --   --    GFRESTIMATED >90  --   --    POTASSIUM 3.5  --   --           Review of Systems  Constitutional, neuro, ENT, endocrine, pulmonary, cardiac, gastrointestinal, genitourinary, musculoskeletal, integument and psychiatric systems are negative, except as otherwise noted.     Objective    Exam  /60 (BP Location: Right arm, Patient  "Position: Sitting, Cuff Size: Adult Regular)   Pulse 79   Temp 97.3  F (36.3  C) (Tympanic)   Resp 16   Ht 1.685 m (5' 6.34\")   Wt 72.8 kg (160 lb 9.6 oz)   LMP 04/11/2013 (LMP Unknown)   SpO2 98%   BMI 25.66 kg/m     Estimated body mass index is 25.66 kg/m  as calculated from the following:    Height as of this encounter: 1.685 m (5' 6.34\").    Weight as of this encounter: 72.8 kg (160 lb 9.6 oz).    Physical Exam  GENERAL: alert and no distress  EYES: Eyes grossly normal to inspection, PERRL and conjunctivae and sclerae normal  HENT: ear canals and TM's normal, nose and mouth without ulcers or lesions  NECK: no adenopathy, no asymmetry, masses, or scars  RESP: lungs clear to auscultation - no rales, rhonchi or wheezes  CV: regular rate and rhythm, normal S1 S2, no S3 or S4, no murmur, click or rub, no peripheral edema  ABDOMEN: soft, nontender, no hepatosplenomegaly, no masses and bowel sounds normal  MS: no gross musculoskeletal defects noted, no edema  SKIN: no suspicious lesions or rashes  NEURO: Normal strength and tone, mentation intact and speech normal  PSYCH: mentation appears normal, affect normal/bright      Signed Electronically by: MEGAN Zhou CNP    "

## 2024-02-15 NOTE — PROGRESS NOTES
TRANSPLANT NEPHROLOGY DONOR EVALUATION    Assessment and Plan:  # Prospective Kidney Transplant Donor: Patient with one issue that needs to be addressed prior to donation. Patient's blood pressure is acceptable at this visit, kidney function appears to be acceptable with Iohexol pending, and urinalysis is bland.    # Low White Blood Cell Count: Patient with mildly low WBC at 3.7.  Previous WBC were normal, although last checked many years early.  She did have a recent viral infection, which might be contributing.   - Recommend repeating WBC.    Discussed the risks of donating a kidney, including the surgical risk and the possible risks of living with one kidney.    Education about expected post-donation kidney function and how chronic kidney disease (CKD) and end stage kidney disease (ESKD) might potentially impact the donor in the future, include, but not limited to:       - On average, donors will have 25-35% permanent loss of kidney function at donation.       - Baseline risk of ESKD may slightly exceed that of members of the general        population with the same demographic profile.       - Donor risks must be interpreted in light of known epidemiology of both CKD or         ESKD, such as that CKD generally develops in midlife (40-50 years old) and ESKD         generally develops after age 60.       - Medical evaluation of young potential donors cannot predict lifetime risk of CKD or         ESKD.       - Donors may be at higher risk for CKD if they sustain damage to the remaining         kidney.       - Development of CKD and progression of ESKD may be more rapid with only 1         kidney.       - Some type of kidney replacement therapy, either kidney transplant or dialysis, is         required when reaching ESKD.    Potential medical or surgical risks include, but not limited to:       - Death.       - Scars, pain, fatigue, and other consequences typical of any surgical procedure.       - Decreased kidney  function.       - Abdominal or bowel symptoms, such as bloating and nausea, and developing         bowel obstruction.       - Kidney failure (ESKD) and the need for a kidney transplant or dialysis for the donor.       - Impact of obesity, hypertension, or other donor-specific medical conditions on         morbidity and mortality of the potential donor.    Patients overall evaluation will be discussed with the transplant team and a final recommendation on the patients' suitability to be a kidney transplant donor will be made at that time.    Consult:  Virginia Wood was seen in consultation at the request of Dr. Danae Sanders for evaluation as a potential kidney transplant donor.    Reason for Visit:  Virginia Wood is a 38 year old female who presents for a kidney donor evaluation.  Patient would like to donate to Josesito Gates, a friend of hers .    Present Condition and Donor-Related Medical History:    Energy level is good and has been normal.  She is active and does get some exercise.  Denies any chest pain or shortness of breath with exertion.  Appetite is good and no recent weight change.  No nausea, vomiting or diarrhea.  No fever, sweats or chills.  No leg swelling.  No pain or burning with urination.    She does have a h/o dysmenorrhea with endometriosis, s/p hysterectomy, bilateral salpingectomy and left oophorectomy.  In addition, patient developed painful right ovarian cyst and is s/p right oophorectomy.  During that surgery, the report talks about pealing the right ovary off the right ureter.         Kidney Disease Hx:       h/o Kidney Problems: No   Family h/o Genetic Kidney Disease: No       h/o Hypertension: No      Usual Blood Pressure:  100/60s       h/o Protein in Urine: No    h/o Blood in Urine: No       h/o Kidney Stones: No     h/o Kidney Injury: No       h/o Recurrent UTI: No   h/o Genitourinary Problems: Yes: h/o dysmenorrhea, endometriosis and ovarian cyst, s/p surgery  for these       h/o Chronic NSAID Use: No         Other Medical Hx:       h/o Diabetes: No             h/o Gastrointestinal, Pancreas or Liver Problems: No       h/o Lung or Heart Problems: No       h/o Hematologic Problems: No  h/o Bleeding or Clotting Problems: No       h/o Cancer: No       h/o Infection Problems: No       H/o Gestational DM: No      H/o Gestational HTN: Yes     H/o Preeclampsia: No         Skin Cancer Risk:       h/o more than 50 moles: No       h/o extensive sun exposure: No       h/o melanoma: No       Family h/o melanoma: No         Mental Health Assessment:       h/o Depression: Yes: When she was young, but no issues lately.       h/o Psychiatric Illness: No       h/o Suicidal Attempt(s): No    COVID Status:  Vaccination Up To Date: No  H/o COVID Infection: No     Review Of Systems:   A comprehensive review of systems was obtained and negative, except as noted in the HPI or PMH.    Past Medical History:   History was taken from the patient as noted below.  Past Medical History:   Diagnosis Date    Depressive disorder     Teen/early twenties    Dysmenorrhea     Endometriosis     treated in past    Fibroid uterus     treated in past    Hormone replacement therapy     post hysterectomy    Ovarian abscess     left tuboovarian abscess, s/p hysterectomy, bilateral salpingectomy and left oophorectomy    Ovarian cyst     right ovarian cyst, s/p right oophorectomy    Polycystic ovaries     treated in past    PONV (postoperative nausea and vomiting)     Urinary tract infection        Past Social History:   Past Surgical History:   Procedure Laterality Date    ABDOMEN SURGERY  May 2013 & Sept 2018    Left oophorectomy & hyst in 2013. Right oophorectomy in 2018    CYSTOSCOPY N/A 09/10/2018    Procedure: CYSTOSCOPY;;  Surgeon: Marcus Burks MD;  Location: [facility]    HYSTERECTOMY, PAP NO LONGER INDICATED      LAPAROSCOPIC HYSTERECTOMY TOTAL, BILATERAL SALPINGO-OOPHORECTOMY, COMBINED  05/20/2013     Procedure: COMBINED LAPAROSCOPIC HYSTERECTOMY TOTAL, SALPINGO-OOPHORECTOMY;  Laparoscopic total hysterectomy & bilateral salpingectomy & left oopherectomy & cystoscopy;  Surgeon: Marcus Burks MD;  Location: [facility]    LAPAROSCOPIC OOPHORECTOMY Right 09/10/2018    Procedure: LAPAROSCOPIC OOPHORECTOMY;  Laparoscopic Right Oophorectomy & Cystoscopy & Retroperitoneal Exploration & Biopsy of Endometriosis;  Surgeon: Marcus Burks MD;  Location: [facility]    WISDOM TOOTH EXTRACTION  06/2007     Personal or family history of anesthesia problems: Yes: Post operative nausea and vomiting    Family History:   Family History   Problem Relation Age of Onset    Depression Mother     Unknown/Adopted Father     Substance Abuse Brother         alcohol    Anxiety Disorder Brother     Lipids Maternal Grandmother     Heart Disease Maternal Grandmother     Eye Disorder Maternal Grandmother         macular degeneration    Diabetes Maternal Grandmother     Hypertension Maternal Grandmother     Hyperlipidemia Maternal Grandmother     Kidney Cancer Maternal Grandmother     Alcohol/Drug Maternal Grandfather         alcohol    Depression Maternal Grandfather     Colon Cancer Maternal Grandfather     Hypertension Maternal Grandfather     Substance Abuse Maternal Grandfather         alcohol    Skin Cancer Maternal Grandfather     Asthma No family hx of     Breast Cancer No family hx of     Cancer - colorectal No family hx of     Prostate Cancer No family hx of     Cerebrovascular Disease No family hx of           Specific Family History in First Degree Relatives:       FH of Kidney Dz: No  FH of Diabetes: No       FH of Hypertension: No FH of CAD: No       FH of Cancer: No  FH of Kidney Cancer: No    Personal History:   Social History     Socioeconomic History    Marital status:      Spouse name: Not on file    Number of children: 2    Years of education: Not on file    Highest education level: Not on file   Occupational  History     Employer: [employer]   Tobacco Use    Smoking status: Former     Packs/day: 1.00     Years: 3.00     Additional pack years: 0.00     Total pack years: 3.00     Types: Cigarettes     Quit date: 2008     Years since quittin.0    Smokeless tobacco: Never    Tobacco comments:     occasional smoker   Vaping Use    Vaping Use: Never used   Substance and Sexual Activity    Alcohol use: Not Currently    Drug use: Never    Sexual activity: Yes     Partners: Male     Birth control/protection: None     Comment:  had vasectomy   Other Topics Concern    Parent/sibling w/ CABG, MI or angioplasty before 65F 55M? No   Social History Narrative     maternity  since 2013     Social Determinants of Health     Financial Resource Strain: Low Risk  (2024)    Financial Resource Strain     Within the past 12 months, have you or your family members you live with been unable to get utilities (heat, electricity) when it was really needed?: No   Food Insecurity: Low Risk  (2024)    Food Insecurity     Within the past 12 months, did you worry that your food would run out before you got money to buy more?: No     Within the past 12 months, did the food you bought just not last and you didn t have money to get more?: No   Transportation Needs: Low Risk  (2024)    Transportation Needs     Within the past 12 months, has lack of transportation kept you from medical appointments, getting your medicines, non-medical meetings or appointments, work, or from getting things that you need?: No   Physical Activity: Sufficiently Active (2024)    Exercise Vital Sign     Days of Exercise per Week: 5 days     Minutes of Exercise per Session: 40 min   Stress: No Stress Concern Present (2024)    [employer] [place] [employer] - Occupational Stress Questionnaire     Feeling of Stress : Not at all   Social Connections: Unknown (2024)    Social Connection and Isolation  Panel [NHANES]     Frequency of Communication with Friends and Family: Not on file     Frequency of Social Gatherings with Friends and Family: Once a week     Attends Christianity Services: Not on file     Active Member of Clubs or Organizations: Not on file     Attends Club or Organization Meetings: Not on file     Marital Status: Not on file   Interpersonal Safety: Not on file   Housing Stability: Low Risk  (2/14/2024)    Housing Stability     Do you have housing? : Yes     Are you worried about losing your housing?: No          Specific Social History:       Health Insurance Status: Yes       Employment Status: Full time  Occupation:                        Living Arrangements: lives with their spouse       Social Support: Yes       Presence of increased risk for disease transmission behaviors as defined by PHS guidelines: No        Allergies:  Allergies   Allergen Reactions    Nkda [No Known Drug Allergy]        Medications:  Current Outpatient Medications   Medication Sig    estradiol (ESTRACE) 2 MG tablet Take 0.5 tablets (1 mg) by mouth daily    Multiple Vitamins-Minerals (DAILY MULTI PO) Take  by mouth.    Multiple Vitamins-Minerals (IMMUNE SUPPORT PO) Immune Support    UNABLE TO FIND Take 1,000 mg by mouth daily MEDICATION NAME: Salomon     Lianet current facility-administered medications for this visit.     Medications Discontinued During This Encounter   Medication Reason    ibuprofen (ADVIL/MOTRIN) 200 MG tablet     ondansetron (ZOFRAN) 4 MG tablet     tretinoin (RETIN-A) 0.025 % external cream     scopolamine (TRANSDERM) 1 MG/3DAYS 72 hr patch     TYLENOL 325 MG OR TABS     estradiol (ESTRACE) 2 MG tablet          Vitals:      2/9/2024     8:00 AM 2/9/2024     8:01 AM 2/9/2024     8:03 AM   Vital Signs   Systolic 99 106 103   Diastolic 65 68 67       Exam:   GENERAL APPEARANCE: alert and no distress  HENT: mouth without ulcers or lesions  RESP: lungs clear to auscultation - no rales, rhonchi or  wheezes  CV: regular rhythm, normal rate, no rub, no murmur  EDEMA: no LE edema bilaterally  ABDOMEN: soft, nondistended, nontender, bowel sounds normal  MS: extremities normal - no gross deformities noted, no evidence of inflammation in joints, no muscle tenderness  SKIN: no rash    Results:   Labs and imaging were ordered for this visit and reviewed by me.  Recent Results (from the past 336 hour(s))   EBV Capsid Antibody IgM    Collection Time: 02/09/24  6:46 AM   Result Value Ref Range    EBV Capsid Abi IgM Instrument Value <10.0 <36.0 U/mL    EBV Capsid Antibody IgM No detectable antibody. No detectable antibody.   EBV Capsid Antibody IgG    Collection Time: 02/09/24  6:46 AM   Result Value Ref Range    EBV Capsid Abi IgG Instrument Value 597.0 (H) <18.0 U/mL    EBV Capsid Antibody IgG Positive (A) No detectable antibody.   CMV Antibody IgG    Collection Time: 02/09/24  6:46 AM   Result Value Ref Range    CMV Abi IgG Instrument Value <0.20 <0.60 U/mL    CMV Antibody IgG No detectable antibody. No detectable antibody.    Trypanosoma Cruzi    Collection Time: 02/09/24  6:46 AM   Result Value Ref Range    Trypanosoma Cruzi Non-reactive    Strongyloides antibody IgG    Collection Time: 02/09/24  6:46 AM   Result Value Ref Range    Strongyloides Abi IgG 0.1 <=0.9 IV   West Nile Virus Antibody IgG IgM    Collection Time: 02/09/24  6:46 AM   Result Value Ref Range    West Nile IgG Serum 0.10 <=1.29 IV    West Nile IgM Serum 0.01 <=0.89 IV   Treponema Abs w Reflex to RPR and Titer    Collection Time: 02/09/24  6:46 AM   Result Value Ref Range    Treponema Antibody Total Nonreactive Nonreactive   HIV Antigen Antibody Combo Pretransplant Cascade    Collection Time: 02/09/24  6:46 AM   Result Value Ref Range    HIV Antigen Antibody Combo Pretransplant Nonreactive Nonreactive   Hepatitis C antibody    Collection Time: 02/09/24  6:46 AM   Result Value Ref Range    Hepatitis C Antibody Nonreactive Nonreactive   Hepatitis B  surface antigen    Collection Time: 02/09/24  6:46 AM   Result Value Ref Range    Hepatitis B Surface Antigen Nonreactive Nonreactive   Hepatitis B Surface Antibody    Collection Time: 02/09/24  6:46 AM   Result Value Ref Range    Hepatitis B Surface Antibody Nonreactive     Hepatitis B Surface Antibody Instrument Value 4.07 <8.5 m[IU]/mL   Hepatitis B core antibody    Collection Time: 02/09/24  6:46 AM   Result Value Ref Range    Hepatitis B Core Antibody Total Nonreactive Nonreactive   Protein  random urine    Collection Time: 02/09/24  6:46 AM   Result Value Ref Range    Total Protein Urine mg/dL <6.0   mg/dL    Total Protein Urine mg/mg Creat      Creatinine Urine mg/dL 18.0 mg/dL   Albumin Random Urine Quantitative with Creat Ratio    Collection Time: 02/09/24  6:46 AM   Result Value Ref Range    Creatinine Urine mg/dL 17.5 mg/dL    Albumin Urine mg/L <12.0 mg/L    Albumin Urine mg/g Cr     Routine UA with microscopic    Collection Time: 02/09/24  6:46 AM   Result Value Ref Range    Color Urine Light Yellow Colorless, Straw, Light Yellow, Yellow    Appearance Urine Clear Clear    Glucose Urine Negative Negative mg/dL    Bilirubin Urine Negative Negative    Ketones Urine Negative Negative mg/dL    Specific Gravity Urine 1.004 1.003 - 1.035    Blood Urine Negative Negative    pH Urine 6.5 5.0 - 7.0    Protein Albumin Urine Negative Negative mg/dL    Urobilinogen Urine Normal Normal, 2.0 mg/dL    Nitrite Urine Negative Negative    Leukocyte Esterase Urine Negative Negative    Bacteria Urine Few (A) None Seen /HPF    RBC Urine 0 <=2 /HPF    WBC Urine 1 <=5 /HPF    Squamous Epithelials Urine 1 <=1 /HPF   CBC with platelets    Collection Time: 02/09/24  6:46 AM   Result Value Ref Range    WBC Count 3.7 (L) 4.0 - 11.0 10e3/uL    RBC Count 4.83 3.80 - 5.20 10e6/uL    Hemoglobin 12.9 11.7 - 15.7 g/dL    Hematocrit 40.7 35.0 - 47.0 %    MCV 84 78 - 100 fL    MCH 26.7 26.5 - 33.0 pg    MCHC 31.7 31.5 - 36.5 g/dL    RDW  12.2 10.0 - 15.0 %    Platelet Count 262 150 - 450 10e3/uL   Partial thromboplastin time    Collection Time: 02/09/24  6:46 AM   Result Value Ref Range    aPTT 28 22 - 38 Seconds   INR    Collection Time: 02/09/24  6:46 AM   Result Value Ref Range    INR 0.95 0.85 - 1.15   Hemoglobin A1c    Collection Time: 02/09/24  6:46 AM   Result Value Ref Range    Hemoglobin A1C 5.4 <5.7 %   Phosphorus    Collection Time: 02/09/24  6:46 AM   Result Value Ref Range    Phosphorus 3.5 2.5 - 4.5 mg/dL   Uric acid    Collection Time: 02/09/24  6:46 AM   Result Value Ref Range    Uric Acid 3.8 2.4 - 5.7 mg/dL   Lipid Profile    Collection Time: 02/09/24  6:46 AM   Result Value Ref Range    Cholesterol 154 <200 mg/dL    Triglycerides 52 <150 mg/dL    Direct Measure HDL 80 >=50 mg/dL    LDL Cholesterol Calculated 64 <=100 mg/dL    Non HDL Cholesterol 74 <130 mg/dL    Patient Fasting > 8hrs? Yes    Comprehensive metabolic panel    Collection Time: 02/09/24  6:46 AM   Result Value Ref Range    Sodium 140 135 - 145 mmol/L    Potassium 3.5 3.4 - 5.3 mmol/L    Carbon Dioxide (CO2) 26 22 - 29 mmol/L    Anion Gap 10 7 - 15 mmol/L    Urea Nitrogen 12.8 6.0 - 20.0 mg/dL    Creatinine 0.75 0.51 - 0.95 mg/dL    GFR Estimate >90 >60 mL/min/1.73m2    Calcium 9.8 8.6 - 10.0 mg/dL    Chloride 104 98 - 107 mmol/L    Glucose 86 70 - 99 mg/dL    Alkaline Phosphatase 58 40 - 150 U/L    AST 31 0 - 45 U/L    ALT 37 0 - 50 U/L    Protein Total 7.9 6.4 - 8.3 g/dL    Albumin 4.6 3.5 - 5.2 g/dL    Bilirubin Total 0.5 <=1.2 mg/dL   Quantiferon TB Gold Plus Grey Tube    Collection Time: 02/09/24  6:46 AM    Specimen: Peripheral IV; Blood   Result Value Ref Range    Quantiferon Nil Tube 0.01 IU/mL   Quantiferon TB Gold Plus Green Tube    Collection Time: 02/09/24  6:46 AM    Specimen: Peripheral IV; Blood   Result Value Ref Range    Quantiferon TB1 Tube 0.01 IU/mL   Quantiferon TB Gold Plus Yellow Tube    Collection Time: 02/09/24  6:46 AM    Specimen: Peripheral  IV; Blood   Result Value Ref Range    Quantiferon TB2 Tube 0.02    Quantiferon TB Gold Plus Purple Tube    Collection Time: 02/09/24  6:46 AM    Specimen: Peripheral IV; Blood   Result Value Ref Range    Quantiferon Mitogen 10.00 IU/mL   Adult Type and Screen    Collection Time: 02/09/24  6:46 AM   Result Value Ref Range    ABO/RH(D) O POS     Antibody Screen Negative Negative    SPECIMEN EXPIRATION DATE [id number]    Quantiferon TB Gold Plus    Collection Time: 02/09/24  6:46 AM    Specimen: Peripheral IV; Blood   Result Value Ref Range    Quantiferon-TB Gold Plus Negative Negative    TB1 Ag minus Nil Value 0.00 IU/mL    TB2 Ag minus Nil Value 0.01 IU/mL    Mitogen minus Nil Result 9.99 IU/mL    Nil Result 0.01 IU/mL   ABO and Rh 2nd type and screen required    Collection Time: 02/09/24  7:21 AM   Result Value Ref Range    ABO/RH(D) O POS     SPECIMEN EXPIRATION DATE [id number]    HLA-ABC Typing High Resolution    Collection Time: 02/09/24  8:20 AM   Result Value Ref Range    ABTEST METHOD NGS     hiresA-1 A*02:01     hiresA-1Equiv 2     hiresA-2 A*11:01     hiresA-2Equiv 11     hiresB-1 B*07:02     hiresB-1Equiv 7     hiresB-2 B*51:01     hiresB-2Equiv 51     hiresC-1 C*07:02     hiresC-1Equiv 7     hiresC-2 C*15:02     hiresC-2Equiv 15     hiresBw-1 Bw*4     hiresBw-2 Bw*6    HLA-DR Typing High Resolution    Collection Time: 02/09/24  8:20 AM   Result Value Ref Range    DRhiresTestM NGS     hiresDPA1-1 DPA1*01:03     hiresDPB1-1 DPB1*04:01     hiresDPB1-1N HJMR 04:01/126:01     hiresDPB1-2 DPB1*04:02     hiresDPB1-2N FNVS 04:02/105:01     hiresDQA1-1 DQA1*01:01     hiresDQA1-2 DQA1*01:02     hiresDQB1-1 DQB1*05:01     hiresDQB1-1Equiv 5     hiresDQB1-2 DQB1*06:02     hiresDQB1-2Equiv 6     hiresDRB1-1 DRB1*01:01     hiresDRB1-1Equiv 1     hiresDRB1-2 DRB1*15:01     hiresDRB1-2Equiv 15     hiresDRB5-1 DRB5*01:01     hiresDRB5-1Equiv 51    Iohexol 1st Order    Collection Time: 02/09/24  9:35 AM   Result  Value Ref Range    Iohexol Time 1 6.46 mg/dL    Iohexol Time 2 2.57 mg/dL    Iohexol Body Surface Area 1.876 m2    Iohexol Raw Clearance 115 mL/min    Iohexol Std Clearance 106 /1.73 m2   EKG 12-lead complete w/read - Clinics    Collection Time: 02/09/24  1:12 PM   Result Value Ref Range    Systolic Blood Pressure  mmHg    Diastolic Blood Pressure  mmHg    Ventricular Rate 60 BPM    Atrial Rate 60 BPM    AZ Interval 176 ms    QRS Duration 80 ms     ms    QTc 408 ms    P Axis -1 degrees    R AXIS 78 degrees    T Axis 41 degrees    Interpretation ECG       Sinus rhythm  Normal ECG  No previous ECGs available  Confirmed by MD RACQUEL, DAVONTE ([id number]) on 2/12/2024 9:51:00 AM

## 2024-02-21 ENCOUNTER — DOCUMENTATION ONLY (OUTPATIENT)
Dept: TRANSPLANT | Facility: CLINIC | Age: 39
End: 2024-02-21
Payer: COMMERCIAL

## 2024-02-21 ENCOUNTER — TELEPHONE (OUTPATIENT)
Dept: TRANSPLANT | Facility: CLINIC | Age: 39
End: 2024-02-21
Payer: COMMERCIAL

## 2024-02-21 ENCOUNTER — COMMITTEE REVIEW (OUTPATIENT)
Dept: TRANSPLANT | Facility: CLINIC | Age: 39
End: 2024-02-21
Payer: COMMERCIAL

## 2024-02-21 NOTE — COMMITTEE REVIEW
Living Donor Committee Review Note Evaluation Date: 2/9/2024  Committee Review Date: 2/21/2024    Donor being evaluated for: Kidney    Transplant Phase: Evaluation  Transplant Status: Active    Transplant Coordinator: Sari Hearn  Transplant Surgeon:       Committee Review Members:  Independent Living Donor Advocate Laverne Mckinney, [facility]   Nephrology Pawan Ham MD, Ritesh Pringle MD   Nutrition Savannah Wills, RD   Pharmacist Jessa Badillo, [facility]    - [place] [facility] [place]   Transplant Kanchan Tamika Benoit, ERNESTO, Judith Mccloud APRN CNP, Tanya Lockhart, RN, Gabriella Marcano, RN, Ana Trammell LPN, RACHELL MARVIN, RN, Katelyn Nance, ERNESTO, Jessa Artis, ERNESTO, Sari Ying, RN   Transplant Surgery Francisco Yee MD       Transplant Eligibility: Acceptable Mental Health, Acceptable Physical Health    Committee Review Decision: Needs Re-presentation    Relative Contraindications: None    Absolute Contraindications: None    Committee Chair Francisco Yee MD verbally attested to the committee's decision.    Committee Discussion Details:   Needs to repeat WBC  Reviewed if she needed to complete mammogram based upon being on Estradiol-Does NOT need to do mammogram until age 40.   Dr. Sanders reviewed CTA renal (laterality) and she wants her to donate RIGHT kidney   Dr. 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 If the patient has symptoms and it's clinically indicated, celiac artery ultrasound with inspiration and expiration could be performed for further evaluation.

## 2024-02-21 NOTE — TELEPHONE ENCOUNTER
Spoke to Virginia regarding committee review results:    Committee Discussion Details:   Needs to repeat WBC  Reviewed if she needed to complete mammogram based upon being on Estradiol-Does NOT need to do mammogram until age 40.   Dr. Sanders reviewed CTA renal (laterality) and she wants her to donate RIGHT kidney   Dr. 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 If the patient has symptoms and it's clinically indicated, celiac artery ultrasound with inspiration and expiration could be performed for further evaluation.

## 2025-01-16 ENCOUNTER — PATIENT OUTREACH (OUTPATIENT)
Dept: CARE COORDINATION | Facility: CLINIC | Age: 40
End: 2025-01-16
Payer: COMMERCIAL

## 2025-01-30 ENCOUNTER — PATIENT OUTREACH (OUTPATIENT)
Dept: CARE COORDINATION | Facility: CLINIC | Age: 40
End: 2025-01-30
Payer: COMMERCIAL

## 2025-03-29 ENCOUNTER — HEALTH MAINTENANCE LETTER (OUTPATIENT)
Age: 40
End: 2025-03-29

## 2025-04-27 ENCOUNTER — E-VISIT (OUTPATIENT)
Dept: URGENT CARE | Facility: CLINIC | Age: 40
End: 2025-04-27
Payer: COMMERCIAL

## 2025-04-27 DIAGNOSIS — R51.9 ACUTE INTRACTABLE HEADACHE, UNSPECIFIED HEADACHE TYPE: Primary | ICD-10-CM

## 2025-04-27 PROCEDURE — 99207 PR NON-BILLABLE SERV PER CHARTING: CPT | Performed by: NURSE PRACTITIONER

## 2025-04-27 NOTE — PATIENT INSTRUCTIONS
Dear Virginia,    We are sorry you are not feeling well. Based on the responses you provided, it is recommended that you be seen in-person in clinic so we can better evaluate your symptoms. Please schedule this visit in [facility]. You will have a Schedule Now button in [facility] to help with scheduling this appointment. Otherwise, you can call [phone] to schedule an appointment.     You will not be charged for this eVisit. Thank you for trusting us with your care.     MEGAN ORTIZ CNP    Dear Virginia Wood,    We are sorry you are not feeling well. Based on the responses you provided, it is recommended that you be seen in-person in urgent care so we can better evaluate your symptoms. Please click here to find the nearest urgent care location to you.   You will not be charged for this Visit. Thank you for trusting us with your care.    MEGAN ORTIZ CNP

## (undated) DEVICE — DRSG TELFA 2X3"

## (undated) DEVICE — SYR 20ML LL W/O NDL

## (undated) DEVICE — ADHESIVE SWIFTSET 0.8ML OCTYL SS6

## (undated) DEVICE — TUBING CYSTO/BLADDER IRRIG SET 80" 06544-01

## (undated) DEVICE — PREP SKIN SCRUB TRAY 4461A

## (undated) DEVICE — STOCKING SLEEVE COMPRESSION CALF LG

## (undated) DEVICE — GOWN XLG DISP 9545

## (undated) DEVICE — SUCTION IRR TRUMPET VALVE LAP ASU1201

## (undated) DEVICE — DEVICE SUTURE GRASPER TROCAR CLOSURE 14GAX15CM PMI-TC-SG

## (undated) DEVICE — GLOVE PROTEXIS W/NEU-THERA 8.0  2D73TE80

## (undated) DEVICE — ENDO TROCAR FIRST ENTRY KII FIOS ADV FIX 12X100MM CFF73

## (undated) DEVICE — GLOVE PROTEXIS MICRO 5.5  2D73PM55

## (undated) DEVICE — ANTIFOG SOLUTION W/FOAM PAD 31142527

## (undated) DEVICE — GLOVE PROTEXIS W/NEU-THERA 6.5  2D73TE65

## (undated) DEVICE — ENDO TROCAR SLEEVE KII ADV FIXATION 05X100MM CFS02

## (undated) DEVICE — SU VICRYL 4-0 FS-2 27" J422-H

## (undated) DEVICE — SOL NACL 0.9% IRRIG 1000ML BOTTLE 07138-09

## (undated) DEVICE — PACK LAPAROSCOPY/PELVISCOPY STD

## (undated) DEVICE — ENDO POUCH UNIV RETRIEVAL SYSTEM INZII 10MM CD001

## (undated) DEVICE — DECANTER VIAL 2006S

## (undated) DEVICE — DRAPE POUCH INSTRUMENT 3 POCKET 1018L

## (undated) DEVICE — PAD PERI INDIV WRAP 11" 2022

## (undated) DEVICE — GLOVE PROTEXIS BLUE W/NEU-THERA 6.5  2D73EB65

## (undated) DEVICE — NDL BUTTERFLY 25GA .75" 367298

## (undated) DEVICE — TUBING C02 INSUFFLATION LAP FILTER HEATER 6198

## (undated) DEVICE — GLOVE PROTEXIS W/NEU-THERA 7.5  2D73TE75

## (undated) DEVICE — ESU LIGASURE MARYLAND LAPAROSCOPIC SLR/DVDR 5MMX37CM LF1937

## (undated) DEVICE — SU VICRYL 0 TIE 54" UND J287G

## (undated) DEVICE — GOWN IMPERVIOUS SPECIALTY XLG/XLONG 32474

## (undated) DEVICE — ESU HOLSTER PLASTIC DISP E2400

## (undated) DEVICE — TUBING SUCTION 12"X1/4" N612

## (undated) DEVICE — ENDO TROCAR FIRST ENTRY KII FIOS ADV FIX 05X100MM CFF03

## (undated) DEVICE — SOL NACL 0.9% IRRIG 3000ML BAG 07972-08

## (undated) DEVICE — GOWN LG DISP 9515

## (undated) DEVICE — GLOVE PROTEXIS BLUE W/NEU-THERA 6.0  2D73EB60

## (undated) RX ORDER — ONDANSETRON 2 MG/ML
INJECTION INTRAMUSCULAR; INTRAVENOUS
Status: DISPENSED
Start: 2018-09-10

## (undated) RX ORDER — KETOROLAC TROMETHAMINE 30 MG/ML
INJECTION, SOLUTION INTRAMUSCULAR; INTRAVENOUS
Status: DISPENSED
Start: 2018-09-10

## (undated) RX ORDER — FENTANYL CITRATE 50 UG/ML
INJECTION, SOLUTION INTRAMUSCULAR; INTRAVENOUS
Status: DISPENSED
Start: 2018-09-10

## (undated) RX ORDER — HYDROCODONE BITARTRATE AND ACETAMINOPHEN 5; 325 MG/1; MG/1
TABLET ORAL
Status: DISPENSED
Start: 2018-09-10

## (undated) RX ORDER — DEXAMETHASONE SODIUM PHOSPHATE 4 MG/ML
INJECTION, SOLUTION INTRA-ARTICULAR; INTRALESIONAL; INTRAMUSCULAR; INTRAVENOUS; SOFT TISSUE
Status: DISPENSED
Start: 2018-09-10

## (undated) RX ORDER — FUROSEMIDE 10 MG/ML
INJECTION INTRAMUSCULAR; INTRAVENOUS
Status: DISPENSED
Start: 2018-09-10

## (undated) RX ORDER — GLYCOPYRROLATE 0.2 MG/ML
INJECTION, SOLUTION INTRAMUSCULAR; INTRAVENOUS
Status: DISPENSED
Start: 2018-09-10

## (undated) RX ORDER — BUPIVACAINE HYDROCHLORIDE 2.5 MG/ML
INJECTION, SOLUTION INFILTRATION; PERINEURAL
Status: DISPENSED
Start: 2018-09-10

## (undated) RX ORDER — BUPIVACAINE HYDROCHLORIDE AND EPINEPHRINE 5; 5 MG/ML; UG/ML
INJECTION, SOLUTION EPIDURAL; INTRACAUDAL; PERINEURAL
Status: DISPENSED
Start: 2018-09-10

## (undated) RX ORDER — SCOLOPAMINE TRANSDERMAL SYSTEM 1 MG/1
PATCH, EXTENDED RELEASE TRANSDERMAL
Status: DISPENSED
Start: 2018-09-10

## (undated) RX ORDER — NEOSTIGMINE METHYLSULFATE 1 MG/ML
VIAL (ML) INJECTION
Status: DISPENSED
Start: 2018-09-10

## (undated) RX ORDER — HYDROMORPHONE HYDROCHLORIDE 1 MG/ML
INJECTION, SOLUTION INTRAMUSCULAR; INTRAVENOUS; SUBCUTANEOUS
Status: DISPENSED
Start: 2018-09-10

## (undated) RX ORDER — MEPERIDINE HYDROCHLORIDE 50 MG/ML
INJECTION INTRAMUSCULAR; INTRAVENOUS; SUBCUTANEOUS
Status: DISPENSED
Start: 2018-09-10